# Patient Record
Sex: MALE | Race: OTHER | NOT HISPANIC OR LATINO | URBAN - METROPOLITAN AREA
[De-identification: names, ages, dates, MRNs, and addresses within clinical notes are randomized per-mention and may not be internally consistent; named-entity substitution may affect disease eponyms.]

---

## 2018-07-30 ENCOUNTER — INPATIENT (INPATIENT)
Facility: HOSPITAL | Age: 68
LOS: 9 days | Discharge: HOME HEALTH | End: 2018-08-09
Attending: NEUROLOGICAL SURGERY | Admitting: NEUROLOGICAL SURGERY

## 2018-07-30 VITALS
RESPIRATION RATE: 18 BRPM | DIASTOLIC BLOOD PRESSURE: 77 MMHG | TEMPERATURE: 97 F | SYSTOLIC BLOOD PRESSURE: 176 MMHG | HEART RATE: 69 BPM | OXYGEN SATURATION: 100 %

## 2018-07-30 DIAGNOSIS — Z95.1 PRESENCE OF AORTOCORONARY BYPASS GRAFT: Chronic | ICD-10-CM

## 2018-07-30 LAB
ANION GAP SERPL CALC-SCNC: 15 MMOL/L — HIGH (ref 7–14)
APTT BLD: 32.1 SEC — SIGNIFICANT CHANGE UP (ref 27–39.2)
BASOPHILS # BLD AUTO: 0.02 K/UL — SIGNIFICANT CHANGE UP (ref 0–0.2)
BASOPHILS NFR BLD AUTO: 0.2 % — SIGNIFICANT CHANGE UP (ref 0–1)
BUN SERPL-MCNC: 11 MG/DL — SIGNIFICANT CHANGE UP (ref 10–20)
CALCIUM SERPL-MCNC: 9.7 MG/DL — SIGNIFICANT CHANGE UP (ref 8.5–10.1)
CHLORIDE SERPL-SCNC: 94 MMOL/L — LOW (ref 98–110)
CK MB CFR SERPL CALC: 1.7 NG/ML — SIGNIFICANT CHANGE UP (ref 0.6–6.3)
CK SERPL-CCNC: 53 U/L — SIGNIFICANT CHANGE UP (ref 0–225)
CO2 SERPL-SCNC: 31 MMOL/L — SIGNIFICANT CHANGE UP (ref 17–32)
CREAT SERPL-MCNC: 0.8 MG/DL — SIGNIFICANT CHANGE UP (ref 0.7–1.5)
EOSINOPHIL # BLD AUTO: 0.07 K/UL — SIGNIFICANT CHANGE UP (ref 0–0.7)
EOSINOPHIL NFR BLD AUTO: 0.7 % — SIGNIFICANT CHANGE UP (ref 0–8)
ERYTHROCYTE [SEDIMENTATION RATE] IN BLOOD: 16 MM/HR — HIGH (ref 0–10)
GLUCOSE SERPL-MCNC: 208 MG/DL — HIGH (ref 70–99)
HCT VFR BLD CALC: 42 % — SIGNIFICANT CHANGE UP (ref 42–52)
HGB BLD-MCNC: 15.1 G/DL — SIGNIFICANT CHANGE UP (ref 14–18)
IMM GRANULOCYTES NFR BLD AUTO: 0.3 % — SIGNIFICANT CHANGE UP (ref 0.1–0.3)
INR BLD: 1.18 RATIO — SIGNIFICANT CHANGE UP (ref 0.65–1.3)
LYMPHOCYTES # BLD AUTO: 1.56 K/UL — SIGNIFICANT CHANGE UP (ref 1.2–3.4)
LYMPHOCYTES # BLD AUTO: 16.1 % — LOW (ref 20.5–51.1)
MCHC RBC-ENTMCNC: 33.9 PG — HIGH (ref 27–31)
MCHC RBC-ENTMCNC: 36 G/DL — SIGNIFICANT CHANGE UP (ref 32–37)
MCV RBC AUTO: 94.2 FL — HIGH (ref 80–94)
MONOCYTES # BLD AUTO: 0.73 K/UL — HIGH (ref 0.1–0.6)
MONOCYTES NFR BLD AUTO: 7.6 % — SIGNIFICANT CHANGE UP (ref 1.7–9.3)
NEUTROPHILS # BLD AUTO: 7.25 K/UL — HIGH (ref 1.4–6.5)
NEUTROPHILS NFR BLD AUTO: 75.1 % — SIGNIFICANT CHANGE UP (ref 42.2–75.2)
NRBC # BLD: 0 /100 WBCS — SIGNIFICANT CHANGE UP (ref 0–0)
PLATELET # BLD AUTO: 96 K/UL — LOW (ref 130–400)
POTASSIUM SERPL-MCNC: 4.1 MMOL/L — SIGNIFICANT CHANGE UP (ref 3.5–5)
POTASSIUM SERPL-SCNC: 4.1 MMOL/L — SIGNIFICANT CHANGE UP (ref 3.5–5)
PROTHROM AB SERPL-ACNC: 12.7 SEC — SIGNIFICANT CHANGE UP (ref 9.95–12.87)
RBC # BLD: 4.46 M/UL — LOW (ref 4.7–6.1)
RBC # FLD: 12.2 % — SIGNIFICANT CHANGE UP (ref 11.5–14.5)
SODIUM SERPL-SCNC: 140 MMOL/L — SIGNIFICANT CHANGE UP (ref 135–146)
TROPONIN T SERPL-MCNC: <0.01 NG/ML — SIGNIFICANT CHANGE UP
WBC # BLD: 9.66 K/UL — SIGNIFICANT CHANGE UP (ref 4.8–10.8)
WBC # FLD AUTO: 9.66 K/UL — SIGNIFICANT CHANGE UP (ref 4.8–10.8)

## 2018-07-30 RX ORDER — ENOXAPARIN SODIUM 100 MG/ML
40 INJECTION SUBCUTANEOUS DAILY
Qty: 0 | Refills: 0 | Status: DISCONTINUED | OUTPATIENT
Start: 2018-07-30 | End: 2018-08-05

## 2018-07-30 RX ORDER — MORPHINE SULFATE 50 MG/1
4 CAPSULE, EXTENDED RELEASE ORAL ONCE
Qty: 0 | Refills: 0 | Status: DISCONTINUED | OUTPATIENT
Start: 2018-07-30 | End: 2018-07-30

## 2018-07-30 RX ORDER — OXYCODONE HYDROCHLORIDE 5 MG/1
10 TABLET ORAL
Qty: 0 | Refills: 0 | Status: DISCONTINUED | OUTPATIENT
Start: 2018-07-30 | End: 2018-08-06

## 2018-07-30 RX ORDER — LOSARTAN POTASSIUM 100 MG/1
50 TABLET, FILM COATED ORAL DAILY
Qty: 0 | Refills: 0 | Status: DISCONTINUED | OUTPATIENT
Start: 2018-07-30 | End: 2018-08-06

## 2018-07-30 RX ADMIN — OXYCODONE HYDROCHLORIDE 10 MILLIGRAM(S): 5 TABLET ORAL at 17:51

## 2018-07-30 RX ADMIN — OXYCODONE HYDROCHLORIDE 10 MILLIGRAM(S): 5 TABLET ORAL at 18:14

## 2018-07-30 RX ADMIN — MORPHINE SULFATE 4 MILLIGRAM(S): 50 CAPSULE, EXTENDED RELEASE ORAL at 09:37

## 2018-07-30 RX ADMIN — ENOXAPARIN SODIUM 40 MILLIGRAM(S): 100 INJECTION SUBCUTANEOUS at 17:51

## 2018-07-30 NOTE — ED PROVIDER NOTE - ATTENDING CONTRIBUTION TO CARE
67M DM not on meds, HTN, CAD CABG, smoker, sent by Dr. Sunita Diez for admission. Found to have C5-7  osteomyelitis/discitis on MRI. pt states he has had months of neck pain. no trauma. no fever, chills. no nausea, vomiting. no numbness, weakness, tingling. no ha. no cp, sob, abd pain, nvdc, dysuria, freq, hematuria, cough. on exam, AFVSS, well zhane nad, ncat, eomi, perrla, mmm, lctab, rrr nl s1s2 no mrg, abd soft ntnd, aaox3, cn 2-12 intact, neck supple no stiffness, no midline c/t/l spine ttp or step off, no rash, no erythema/warmth, nontender neck/back, normal gait, 5/5 motor x 4 ext, silt x 4 ext, no le edema or calf ttp, a/p; Admit for osteomyelitis/discitis, labs, ns c/s, ID c/s

## 2018-07-30 NOTE — H&P ADULT - NSHPLABSRESULTS_GEN_ALL_CORE
Vital Signs Last 24 Hrs  T(C): 36.4 (30 Jul 2018 12:20), Max: 36.4 (30 Jul 2018 12:20)  T(F): 97.5 (30 Jul 2018 12:20), Max: 97.5 (30 Jul 2018 12:20)  HR: 63 (30 Jul 2018 12:20) (63 - 69)  BP: 175/82 (30 Jul 2018 12:20) (132/71 - 176/77)  BP(mean): --  RR: 18 (30 Jul 2018 12:20) (18 - 18)  SpO2: 98% (30 Jul 2018 12:20) (98% - 100%)                          15.1   9.66  )-----------( 96       ( 30 Jul 2018 09:22 )             42.0       07-30    140  |  94<L>  |  11  ----------------------------<  208<H>  4.1   |  31  |  0.8    Ca    9.7      30 Jul 2018 09:22

## 2018-07-30 NOTE — ED PROVIDER NOTE - PHYSICAL EXAMINATION
Gen: Alert, NAD, well appearing  Head: NC, AT, PERRL, EOMI  ENT: normal hearing, patent oropharynx without erythema/exudate, uvula midline  Neck: +supple, generalized tenderness to cervical spine  Pulm: Bilateral BS, normal resp effort  CV: RRR  Abd: soft, NT/ND  Mskel: no edema  Skin: no rash  Neuro: AAOx3

## 2018-07-30 NOTE — H&P ADULT - NSHPPHYSICALEXAM_GEN_ALL_CORE
PHYSICAL EXAM:      Constitutional: Alert, awake, in mild distress due to pain.    Eyes: EOMI, PERRLA, visual fields full to confrontation, no eye pain/discharge    ENMT: NCAT, no ear pain/discharge    Neck: supple. Pain on neck flexion and extension+; ROM not decreased    Respiratory: clear to auscultation bilaterally    Cardiovascular: S1 S2 normal. no murmur, rub or gallop    Gastrointestinal: soft, nontender, nondistended. bowel sounds+    Neurological: AAOx3, V1-V3 intact. Tongue midline. Muscle strength 5/5 on shoulder shrug b/l, both UE and LE. Sensation to light touch intact. Gait normal.    Musculoskeletal: range of motion normal on arm abduction, adduction, flexion and extension bilaterally. Pain+ on abduction of arms bilaterally.

## 2018-07-30 NOTE — H&P ADULT - NSHPOUTPATIENTPROVIDERS_GEN_ALL_CORE
Dr. Marium Gurrola (PCP)  Dr. Diez (Neurosurgery)  Dr. Aurelio Krishnamurthy  (Pain management specialist) Dr. Marium Gurrola (PCP)  Dr. Sunita Diez (Neurosurgery)  Dr. Aurelio Krishnamurthy  (Pain management specialist)

## 2018-07-30 NOTE — ED PROVIDER NOTE - NS ED ROS FT
Review of Systems  Constitutional: (-) fever  Eyes/ENT: (-) blurry vision  Cardiovascular: (-) chest pain  Respiratory: (-) cough, (-) shortness of breath  Gastrointestinal: (-) vomiting, (-) diarrhea  Musculoskeletal: (+) neck pain  Integumentary: (-) rash  Neurological: (-) headache

## 2018-07-30 NOTE — H&P ADULT - PSH
Problem: Patient Care Overview  Goal: Plan of Care Review  Outcome: Ongoing (interventions implemented as appropriate)  Patient's mental status waxing and waning throughout day. Patient has been more alert this afternoon however - able to eat dinner without difficulty. Meds crushed and given with applesauce. UA done. 1L NS bolus given x1, MIVF now infusing at 100cc/hr. Patient had 1 incontinent episode of urine this afternoon following bladder scan. No BM since this am - awaiting sample to send for occult blood. GIOVANNI with bubble study ordered. Patient currently off unit for CT of chest/abdomen/pelvis.        History of coronary artery bypass surgery

## 2018-07-30 NOTE — H&P ADULT - PMH
Coronary artery disease  s/p CABG  Diabetes mellitus  Diet-controlled; has taken metformin in the past as per patient  HTN (hypertension)  On atenolol-chlorthalidone but doesnt take it

## 2018-07-30 NOTE — H&P ADULT - ATTENDING COMMENTS
Patient is seen and examined independently at bedside. I agree with the resident's note, history as above.     ROS:  CONSTITUTIONAL: No fever, chills, generalized weakness or fatigue.  EYES: No eye pain, visual disturbances, or discharge.  ENMT:  No difficulty hearing, tinnitus, vertigo; No sinus or throat pain.  RESPIRATORY: No cough, wheezing, chills or hemoptysis; No shortness of breath.  CARDIOVASCULAR: No chest pain, palpitations, dizziness.  GASTROINTESTINAL: No abdominal or epigastric pain. No nausea, vomiting, or hematemesis; No diarrhea or constipation.   GENITOURINARY: No dysuria, frequency, hematuria, or incontinence.  NEUROLOGICAL: No headaches, memory loss, loss of strength, numbness, or tremors.  ENDOCRINE: No heat or cold intolerance; No hair loss.  MUSCULOSKELETAL: Neck and upper back pain as per HPI. No joint pain or swelling;   HEME/LYMPH: No easy bruising, or bleeding gums.  SKIN: No rash or itchiness.    PE:  GENERAL: NAD, well-groomed, well-developed.  HEAD:  Atraumatic, Normocephalic.  EYES: EOMI, PERRLA, conjunctiva and sclera clear.  ENMT: Moist mucous membranes, No lesions.  NERVOUS SYSTEM:  Alert & Oriented X3, Good concentration; No limb weakness or numbness noted over bilateral upper and lower extremities.  RESPIRATORY: Clear to percussion bilaterally; No rales, rhonchi, wheezing, or rubs.  CARDIOVASCULAR: Regular rate and rhythm; No murmurs, rubs, or gallops.  GASTROINTESTINAL: Soft, Nontender, Nondistended; Bowel sounds present.  MUSCULOSKELETAL: Move all extremities.   EXTREMITIES: No clubbing, cyanosis, or edema.  LYMPH: No lymphadenopathy noted.  SKIN: No rashes or lesions.    # Possible Cervical osteomyelitis  - obtain blood culture  - as per ID, check ESR, CRP, HIV, TB  - holding antibiotics for now  - follow up neurosurgery regarding need for biopsy vs. further imaging  - CXR reviewed, negative for opacities or lesions, follow up official results  - pain control    # Diabetes mellitus   - monitor fingerstick glucose, check HbA1c  - start insulin regimen if fingerstick > 180    # HTN  - on ARB, holding home meds    # DVT Prophylaxis   - on lovenox subcut    All labs, radiologic studies, vitals, orders and medications list reviewed. Patient is seen and examined at bedside. Case discussed with medical team. Patient is seen and examined independently at bedside. I agree with the resident's note, history as above.     ROS:  CONSTITUTIONAL: No fever, chills, generalized weakness or fatigue.  EYES: No eye pain, visual disturbances, or discharge.  ENMT:  No difficulty hearing, tinnitus, vertigo; No sinus or throat pain.  RESPIRATORY: No cough, wheezing, chills or hemoptysis; No shortness of breath.  CARDIOVASCULAR: No chest pain, palpitations, dizziness.  GASTROINTESTINAL: No abdominal or epigastric pain. No nausea, vomiting, or hematemesis; No diarrhea or constipation.   GENITOURINARY: No dysuria, frequency, hematuria, or incontinence.  NEUROLOGICAL: No headaches, memory loss, loss of strength, numbness, or tremors.  ENDOCRINE: No heat or cold intolerance; No hair loss.  MUSCULOSKELETAL: Neck and upper back pain as per HPI. No joint pain or swelling;   HEME/LYMPH: No easy bruising, or bleeding gums.  SKIN: No rash or itchiness.    PE:  GENERAL: NAD, well-groomed, well-developed.  HEAD:  Atraumatic, Normocephalic.  EYES: EOMI, PERRLA, conjunctiva and sclera clear.  ENMT: Moist mucous membranes, No lesions.  NERVOUS SYSTEM:  Alert & Oriented X3, Good concentration; No limb weakness or numbness noted over bilateral upper and lower extremities.  RESPIRATORY: Clear to percussion bilaterally; No rales, rhonchi, wheezing, or rubs.  CARDIOVASCULAR: Regular rate and rhythm; No murmurs, rubs, or gallops.  GASTROINTESTINAL: Soft, Nontender, Nondistended; Bowel sounds present.  MUSCULOSKELETAL: Move all extremities.   EXTREMITIES: No clubbing, cyanosis, or edema.  LYMPH: No lymphadenopathy noted.  SKIN: No rashes or lesions.    # Possible Cervical Osteomyelitis/Discitis   - obtain blood culture  - as per ID, check ESR, CRP, HIV, Tuberculosis testing  - holding antibiotics for now  - follow up neurosurgery regarding need for culture/biopsy vs. further imaging  - CXR reviewed, negative for opacities or lesions, follow up official results  - pain control    # Diabetes mellitus   - monitor fingerstick glucose, check HbA1c  - start insulin regimen if fingerstick > 180    # HTN  - on ARB, holding home meds    # DVT Prophylaxis   - on lovenox subcut    All labs, radiologic studies, vitals, orders and medications list reviewed. Patient is seen and examined at bedside. Case discussed with medical team.

## 2018-07-30 NOTE — ED PROVIDER NOTE - OBJECTIVE STATEMENT
Patient is a 67 years old M pmhx DM (diet controlled), CABG on oxycodone for neck pain presents to the ED after evaluation as an outpatient by neurosurgery for admission possible osteo vs/discitis. No fever no chills. Was sent on 7/25 but notes pain increased since yesterday.

## 2018-07-30 NOTE — H&P ADULT - HISTORY OF PRESENT ILLNESS
66 yo male with PMHx of DM (diet controlled), HTN, CAD s/p CABG, presents to the ED with worsening pain in the neck and upper back. Pain is present since the past 6 weeks, in the neck and upper back and the shoulders b/l, more so in the left shoulder since the last 5-6 days. He describes the pain as steady and dull usually but becomes sharp during movement, rates 10/10 in intensity, aggravated by the movement, partially relieved for about 5-6 hours with Oxycodone (10mg 2 tabs twice a day). No visible skin changes in the back, no h/o trauma, no stiffness. No previous episodes of similar pain. Patient denies any history of fever, chills, rigor, dizziness or LOC. No history of loss of appetite, fatigue. Patient reports history of night sweats but did not see that as anything abnormal (before the pain started). Patient reports tingling sensation in right hand fingers at times but no weakness or numbness anywhere. No vision and hearing changes. Patient has history of travel last february to [facility], [facility] and [facility]. No history of any sick contacts. Patient has had 3 MRIs of the cervical spine (last 07/24/2018) and was referred by Dr. Diez for inpatient treatment. 68 yo male with PMHx of DM (diet controlled), HTN, CAD s/p CABG, presents to the ED with worsening pain in the neck and upper back. Pain is present since the past 6 weeks, in the neck and upper back and the shoulders b/l, now radiating to the left shoulder since the last 5-6 days. He describes the pain as steady and dull usually but becomes sharp during movement, rates '20/10' in intensity, aggravated by the movement, partially relieved for about 5-6 hours with Oxycodone (10mg 2 tabs twice a day). No visible skin changes in the back, no history of trauma, no stiffness. No previous episodes of similar pain. Patient denies any history of fever, chills, rigor, dizziness or LOC. No history of loss of appetite, fatigue. Patient reports history of night sweats but did not see that as anything abnormal (before the pain started). Patient reports tingling sensation in right hand fingers at times but no weakness or numbness anywhere. No vision and hearing changes. Patient has history of travel last february to [facility], [facility] and [facility]. No history of any sick contacts. Patient has had 3 MRIs of the cervical spine (last 07/24/2018) and was referred by Dr. Diez for inpatient treatment.   Patient has a CD disc with his MRI results (put in his ED chart) 68 yo male with PMHx of DM (diet controlled), HTN, CAD s/p CABG, presents to the ED with worsening pain in the neck and upper back. Pain is present since the past 6 weeks, in the neck and upper back and the shoulders b/l, now radiating to the left shoulder since the last 5-6 days. He describes the pain as steady and dull usually but becomes sharp during movement, rates '20/10' in intensity, aggravated by the movement, partially relieved for about 5-6 hours with Oxycodone (10mg 2 tabs twice a day). No visible skin changes in the back, no history of trauma, no stiffness. No previous episodes of similar pain. Patient denies any history of fever, chills, rigor, dizziness or LOC. No history of loss of appetite, fatigue. Patient reports history of night sweats but did not see that as anything abnormal (before the pain started). Patient reports tingling sensation in right hand fingers at times but no weakness or numbness anywhere. No vision and hearing changes. Patient has history of travel last february to [facility], [facility] [place] and [facility]. No history of any sick contacts. Patient has had 3 MRIs of the cervical spine (last 07/24/2018) because of chronic neck pain and was referred by Dr. Sunita Diez (neurosurgery) for inpatient treatment to rule out osteomyelitis based on MRI findings suspicious for osteomyelitis.  Patient has a CD disc with his MRI results (put in his ED chart)

## 2018-07-30 NOTE — H&P ADULT - ASSESSMENT
66 yo male with PMHx of DM (diet controlled), HTN, CAD s/p CABG, presents to the ED with worsening pain in the neck and upper back, likely to due cervical osteomyelitis vs. discitis.     #Cervical osteomyelitis  -Daily cbc  -Check CRP, ESR  -Follow up with ID reccomendations (consult placed)  -Consult neurosurgery regarding previous MRIs and if a new imaging study is needed (Dr. Diez)  -No antibiotics indicated at the time as the patient is not septic  -Check Quantiferon test for possible TB exposure  -Get Chest Xray to find any pulmonary TB lesion  -Continue oxycodone 10mg bid prn for pain control    #Diabetes mellitus   -Check fingerstick glucose AC/HS  -Check HbA1c  -Carb consistent diet  -Order insulin if glucose levels are consistently high     #HTN    -Start Losartan 50mg once a day   -Hold Atenolol-chlorthalidone 50/25mg   -DASH diet    #Miscellaneous  -DVT ppx (not indicated as the patient is ambulating well)  -GI ppx (Protonix)  -Diet: Carb consistent DASH diet  -Activity: Ambulate as tolerated  -Full code  -Dispo home    # 66 yo male with PMHx of DM (diet controlled), HTN, CAD s/p CABG, presents to the ED with worsening pain in the neck and upper back, likely to due cervical osteomyelitis vs. discitis.     #Cervical osteomyelitis  -Daily cbc  -Check CRP, ESR  -Follow up with ID reccomendations (consult placed)  -Consult neurosurgery regarding previous MRIs and if a new imaging study is needed (Dr. Diez)  -No antibiotics indicated at the time as the patient is not septic  -Check Quantiferon test for possible TB exposure  -Get Chest Xray to find any pulmonary TB lesion  -Continue oxycodone 10mg bid prn for pain control    #Diabetes mellitus   -Check fingerstick glucose AC/HS  -Check HbA1c  -Carb consistent diet  -Order insulin if glucose levels are consistently high     #HTN    -Start Losartan 50mg once a day   -Hold Atenolol-chlorthalidone 50/25mg   -DASH diet  -12 lead EKG    #Miscellaneous  -DVT ppx (not indicated as the patient is ambulating well)  -GI ppx (Protonix)  -Diet: Carb consistent DASH diet  -Activity: Ambulate as tolerated  -Full code  -Dispo home 66 yo male with PMHx of DM (diet controlled), HTN, CAD s/p CABG, was sent to the ED by neurosurgery (Dr. Diez) due to complaints of chronic neck pain (6 weeks) and MRI findings suspicious for cervical osteomyelitis.     #Possible Cervical osteomyelitis  -Daily cbc  -Check CRP, ESR  -Follow up with ID recommendations (consult placed)  -Consult neurosurgery regarding further recommendations  -No antibiotics indicated at the time as the patient is not septic  -Check Quantiferon test for possible TB exposure  -Get Chest X-ray to find any pulmonary TB lesion  -Airborne precautions to be in place pending Quantiferon results and chest xray.  -Continue oxycodone 10mg 2 tabs bid for pain control    #Diabetes mellitus   -Check fingerstick glucose AC/HS  -Check HbA1c  -Carb consistent diet  -Order insulin if glucose levels are consistently high ( >180mg/dl)    #HTN    -Start Losartan 50mg once a day as the patient is diabetic.  -Hold Atenolol-chlorthalidone 50/25mg as the patient was not taking it at home.  -DASH diet  -12 lead EKG    #Miscellaneous  -DVT ppx (Lovenox 40mg sq)  -GI ppx (not indicated)  -Diet: Carb consistent DASH diet  -Activity: Ambulate as tolerated  -Full code  -Dispo home 66 yo male with PMHx of DM (diet controlled), HTN, CAD s/p CABG, was sent to the ED by neurosurgery (Dr. Diez) due to complaints of chronic neck pain (6 weeks) and MRI findings suspicious for cervical osteomyelitis.     #Possible Cervical osteomyelitis  -Daily cbc  -Check CRP, ESR  -Follow up with ID recommendations (consult placed)  -Consult neurosurgery regarding further recommendations  -No antibiotics indicated at the time as the patient is not septic  -Check Quantiferon test for possible TB exposure  -Get Chest X-ray to find any pulmonary TB lesion  -Airborne precautions to be in place pending Quantiferon results and chest xray.  -Continue oxycodone 10mg 2 tabs bid for pain control    #Diabetes mellitus   -Check fingerstick glucose AC/HS  -Check HbA1c  -Carb consistent diet  -Order insulin if glucose levels are consistently high ( >180mg/dl)    #HTN    -Start Losartan 50mg once a day as the patient is diabetic.  -Hold Atenolol-chlorthalidone 50/25mg as the patient was not taking it at home.  -DASH diet  -12 lead EKG    #Miscellaneous  -DVT ppx (Lovenox 40mg sq once daily)  -GI ppx (not indicated)  -Diet: Carb consistent DASH diet  -Activity: Ambulate as tolerated  -Full code  -Dispo home

## 2018-07-30 NOTE — CONSULT NOTE ADULT - SUBJECTIVE AND OBJECTIVE BOX
HPI:   67 year old male with PMHx of DM (diet controlled), HTN, CAD s/p CABG, presents with worsening pain in the neck and upper back, which started about 6 weeks ago. He denies any fevers but reports night sweats. He did not have any surgical procedures in last 6 months, including dental procedures. No h/o IVDU. He reports tingling sensation in right hand. Outpt. MRI showed possible osteomyelitis.     Allergies: No Known Allergies:      Past Medical History:  Coronary artery disease  s/p CABG  Diabetes mellitus  Diet-controlled; has taken metformin in the past as per patient  HTN (hypertension)  On atenolol-chlorthalidone.     Past Surgical History:  History of coronary artery bypass surgery.    Meds:   enoxaparin Injectable 40 milliGRAM(s) SubCutaneous daily  losartan 50 milliGRAM(s) Oral daily  oxyCODONE    IR 10 milliGRAM(s) Oral two times a day    Awake, alert, oriented, on room air, no wheezing, neck movement without restriction, motor strength in all extremities 5/5,  Abdomen- soft, nontender, no edema, no rash.     T(C): 36.4 (07-30-18 @ 12:20), Max: 36.4 (07-30-18 @ 12:20)  HR: 63 (07-30-18 @ 12:20) (63 - 69)  BP: 175/82 (07-30-18 @ 12:20) (132/71 - 176/77)  RR: 18 (07-30-18 @ 12:20) (18 - 18)  SpO2: 98% (07-30-18 @ 12:20) (98% - 100%)                          15.1   9.66  )-----------( 96       ( 30 Jul 2018 09:22 )             42.0       07-30    140  |  94<L>  |  11  ----------------------------<  208<H>  4.1   |  31  |  0.8    Ca    9.7      30 Jul 2018 09:22    Outpt MRI- Images not seen. Report noted.

## 2018-07-30 NOTE — H&P ADULT - FAMILY HISTORY
No pertinent family history in first degree relatives Mother  Still living? Unknown  Family history of heart disease, Age at diagnosis: Age Unknown

## 2018-07-30 NOTE — ED PROVIDER NOTE - PROGRESS NOTE DETAILS
patient admitted to medicine service under Dr Villalobos who accepted patient , MAR aware, Dr Bossman MARTINEZ will consult on patient for abx choice, neurosurgery DISHA Ohara aware of patient

## 2018-07-30 NOTE — CONSULT NOTE ADULT - ASSESSMENT
68 yo male with PMHx of DM (diet controlled), HTN, CAD s/p CABG, was sent to the ED by Dr. Diez for evaluation of upper back pain.     # Upper back pain due to cervical osteomyelitis vs discitis:   - No risk factor identified during history.  - Blood culture and urine culture.  - Check CRP, ESR, TB Gold,   - Hold antibiotics for now.   - CXR and MRI pending    #Diabetes mellitus, HTN,     # Full code 66 yo male with PMHx of DM (diet controlled), HTN, CAD s/p CABG, was sent to the ED by Dr. Diez for evaluation of upper back pain.     # Upper back pain due to cervical osteomyelitis vs discitis:   - No risk factor identified during history.  - Blood culture and urine culture.  - Check CRP, ESR, TB Gold, HIV, 2D Echo.   - Hold antibiotics for now.   - CXR and MRI pending    #Diabetes mellitus, HTN,     # Full code

## 2018-07-31 LAB
ANION GAP SERPL CALC-SCNC: 11 MMOL/L — SIGNIFICANT CHANGE UP (ref 7–14)
BUN SERPL-MCNC: 13 MG/DL — SIGNIFICANT CHANGE UP (ref 10–20)
CALCIUM SERPL-MCNC: 9.7 MG/DL — SIGNIFICANT CHANGE UP (ref 8.5–10.1)
CHLORIDE SERPL-SCNC: 97 MMOL/L — LOW (ref 98–110)
CK MB CFR SERPL CALC: 1.9 NG/ML — SIGNIFICANT CHANGE UP (ref 0.6–6.3)
CK SERPL-CCNC: 53 U/L — SIGNIFICANT CHANGE UP (ref 0–225)
CO2 SERPL-SCNC: 33 MMOL/L — HIGH (ref 17–32)
CREAT SERPL-MCNC: 0.9 MG/DL — SIGNIFICANT CHANGE UP (ref 0.7–1.5)
CRP SERPL-MCNC: 0.25 MG/DL — SIGNIFICANT CHANGE UP (ref 0–0.4)
ERYTHROCYTE [SEDIMENTATION RATE] IN BLOOD: 29 MM/HR — HIGH (ref 0–10)
ESTIMATED AVERAGE GLUCOSE: 197 MG/DL — HIGH (ref 68–114)
GLUCOSE SERPL-MCNC: 175 MG/DL — HIGH (ref 70–99)
HBA1C BLD-MCNC: 8.5 % — HIGH (ref 4–5.6)
HCT VFR BLD CALC: 42.4 % — SIGNIFICANT CHANGE UP (ref 42–52)
HGB BLD-MCNC: 15.1 G/DL — SIGNIFICANT CHANGE UP (ref 14–18)
MCHC RBC-ENTMCNC: 33.8 PG — HIGH (ref 27–31)
MCHC RBC-ENTMCNC: 35.6 G/DL — SIGNIFICANT CHANGE UP (ref 32–37)
MCV RBC AUTO: 94.9 FL — HIGH (ref 80–94)
NRBC # BLD: 0 /100 WBCS — SIGNIFICANT CHANGE UP (ref 0–0)
PLATELET # BLD AUTO: 86 K/UL — LOW (ref 130–400)
POTASSIUM SERPL-MCNC: 4.5 MMOL/L — SIGNIFICANT CHANGE UP (ref 3.5–5)
POTASSIUM SERPL-SCNC: 4.5 MMOL/L — SIGNIFICANT CHANGE UP (ref 3.5–5)
RBC # BLD: 4.47 M/UL — LOW (ref 4.7–6.1)
RBC # FLD: 12.3 % — SIGNIFICANT CHANGE UP (ref 11.5–14.5)
SODIUM SERPL-SCNC: 141 MMOL/L — SIGNIFICANT CHANGE UP (ref 135–146)
TROPONIN T SERPL-MCNC: <0.01 NG/ML — SIGNIFICANT CHANGE UP
WBC # BLD: 5.71 K/UL — SIGNIFICANT CHANGE UP (ref 4.8–10.8)
WBC # FLD AUTO: 5.71 K/UL — SIGNIFICANT CHANGE UP (ref 4.8–10.8)

## 2018-07-31 RX ORDER — ASPIRIN/CALCIUM CARB/MAGNESIUM 324 MG
81 TABLET ORAL DAILY
Qty: 0 | Refills: 0 | Status: DISCONTINUED | OUTPATIENT
Start: 2018-07-31 | End: 2018-08-04

## 2018-07-31 RX ADMIN — ENOXAPARIN SODIUM 40 MILLIGRAM(S): 100 INJECTION SUBCUTANEOUS at 12:12

## 2018-07-31 RX ADMIN — OXYCODONE HYDROCHLORIDE 10 MILLIGRAM(S): 5 TABLET ORAL at 18:15

## 2018-07-31 RX ADMIN — OXYCODONE HYDROCHLORIDE 10 MILLIGRAM(S): 5 TABLET ORAL at 08:05

## 2018-07-31 RX ADMIN — OXYCODONE HYDROCHLORIDE 10 MILLIGRAM(S): 5 TABLET ORAL at 19:13

## 2018-07-31 RX ADMIN — Medication 81 MILLIGRAM(S): at 12:12

## 2018-07-31 RX ADMIN — LOSARTAN POTASSIUM 50 MILLIGRAM(S): 100 TABLET, FILM COATED ORAL at 07:05

## 2018-07-31 RX ADMIN — OXYCODONE HYDROCHLORIDE 10 MILLIGRAM(S): 5 TABLET ORAL at 07:06

## 2018-07-31 NOTE — PROGRESS NOTE ADULT - ASSESSMENT
68 yo male with PMHx of DM (diet controlled), HTN, CAD s/p CABG, was sent to the ED by Dr. Diez for evaluation of upper back pain.     # Upper back pain due to cervical osteomyelitis vs discitis:   - No risk factor identified during history except international travel.   - TB Gold, HIV , Blood culture and urine culture pending  - Hold antibiotics for now.   - OR for biopsy by neurosurg. later.   - Echo shows a possible vegetation on PMVL.   - Cardiology consult for possible JUANA.   - CXR did not show active TB.    #Diabetes mellitus, HTN.    # Full code

## 2018-07-31 NOTE — PROGRESS NOTE ADULT - SUBJECTIVE AND OBJECTIVE BOX
INTERVAL HPI/OVERNIGHT EVENTS:  Stable  No Known Allergies    aspirin  chewable 81 milliGRAM(s) Oral daily  enoxaparin Injectable 40 milliGRAM(s) SubCutaneous daily  losartan 50 milliGRAM(s) Oral daily  oxyCODONE    IR 10 milliGRAM(s) Oral two times a day      Vital Signs Last 24 Hrs  T(C): 36.4 (31 Jul 2018 13:09), Max: 36.4 (31 Jul 2018 05:16)  T(F): 97.6 (31 Jul 2018 13:09), Max: 97.6 (31 Jul 2018 13:09)  HR: 75 (31 Jul 2018 13:09) (64 - 80)  BP: 135/59 (31 Jul 2018 13:09) (135/59 - 149/85)  BP(mean): --  RR: 18 (31 Jul 2018 13:09) (18 - 18)  SpO2: 97% (31 Jul 2018 07:30) (97% - 98%)    LABS:                        15.1   5.71  )-----------( 86       ( 31 Jul 2018 07:59 )             42.4     07-31    141  |  97<L>  |  13  ----------------------------<  175<H>  4.5   |  33<H>  |  0.9    Ca    9.7      31 Jul 2018 07:59    Hemoglobin A1C with Mean Plasma Glucose (07.31.18 @ 07:59)    Hemoglobin A1C, Whole Blood: 8.5 %    Sedimentation Rate, Erythrocyte (07.30.18 @ 16:20)    Sedimentation Rate, Erythrocyte: 16 mm/Hr    XR CHEST PA LAT 2V:  07/30/2018    No radiographic evidence of acute cardiopulmonary disease.     2-D ECHO (TTE) COMPLETE:  07/31/2018     1. Left ventricular ejection fraction, by visual estimation, is 55 to   60%.   2. LV Ejection Fraction by Brown's Method with a biplane EF of 59 %.   3. Spectral Doppler shows impaired relaxation pattern of left   ventricular myocardial filling (Grade I diastolic dysfunction).   4. Mildly enlarged left atrium.   5. Normal right atrial size.   6. Mild mitral annular calcification.   7. Thickening of the anterior and posterior mitral valve leaflets.   8. Cannot exclude a vegetation on atrial aspect of PMVL.   9. Trace tricuspid regurgitation.  10. No evidence of aortic stenosis.    Outpt MRI images (not seen personally):  +  contrast enhancement and disc destruction of cervical spine with epidural tissue, resulting in thecal sac impingement. INTERVAL HPI/OVERNIGHT EVENTS:  Stable  No Known Allergies    aspirin  chewable 81 milliGRAM(s) Oral daily  enoxaparin Injectable 40 milliGRAM(s) SubCutaneous daily  losartan 50 milliGRAM(s) Oral daily  oxyCODONE    IR 10 milliGRAM(s) Oral two times a day    awake, alert, s1s2, no wheezing, no skin changes on neck, abd- soft.    Vital Signs Last 24 Hrs  T(C): 36.4 (31 Jul 2018 13:09), Max: 36.4 (31 Jul 2018 05:16)  T(F): 97.6 (31 Jul 2018 13:09), Max: 97.6 (31 Jul 2018 13:09)  HR: 75 (31 Jul 2018 13:09) (64 - 80)  BP: 135/59 (31 Jul 2018 13:09) (135/59 - 149/85)  BP(mean): --  RR: 18 (31 Jul 2018 13:09) (18 - 18)  SpO2: 97% (31 Jul 2018 07:30) (97% - 98%)    LABS:                        15.1   5.71  )-----------( 86       ( 31 Jul 2018 07:59 )             42.4     07-31    141  |  97<L>  |  13  ----------------------------<  175<H>  4.5   |  33<H>  |  0.9    Ca    9.7      31 Jul 2018 07:59    Hemoglobin A1C with Mean Plasma Glucose (07.31.18 @ 07:59)    Hemoglobin A1C, Whole Blood: 8.5 %    Sedimentation Rate, Erythrocyte (07.30.18 @ 16:20)    Sedimentation Rate, Erythrocyte: 16 mm/Hr    XR CHEST PA LAT 2V:  07/30/2018    No radiographic evidence of acute cardiopulmonary disease.     2-D ECHO (TTE) COMPLETE:  07/31/2018     1. Left ventricular ejection fraction, by visual estimation, is 55 to   60%.   2. LV Ejection Fraction by Brown's Method with a biplane EF of 59 %.   3. Spectral Doppler shows impaired relaxation pattern of left   ventricular myocardial filling (Grade I diastolic dysfunction).   4. Mildly enlarged left atrium.   5. Normal right atrial size.   6. Mild mitral annular calcification.   7. Thickening of the anterior and posterior mitral valve leaflets.   8. Cannot exclude a vegetation on atrial aspect of PMVL.   9. Trace tricuspid regurgitation.  10. No evidence of aortic stenosis.    Outpt MRI images (not seen personally):  +  contrast enhancement and disc destruction of cervical spine with epidural tissue, resulting in thecal sac impingement.

## 2018-07-31 NOTE — CONSULT NOTE ADULT - SUBJECTIVE AND OBJECTIVE BOX
Pt known to me from outpatient. Presented to my office with neck and bilateral shoulder, upper thoracic pain x 4 weeks, with progressive left subtle arm weakness noted on my exam.      Denied fevers, but admits to night sweats.    XR of spine and MRI of cervical spine with and without cst demonstrates lesion within cervical spine highly suspicious for osteomyelitis, ?TB    Pt with (=) hx of travel within last 6 months to [place].     (+) strong smoking hx  (+) strong cardiac hx, s/p CABG, NONCOMPLIANT with cardiac follow up.  (+) diabetic.      On exam, pt noted to have at least ~5 lb weight loss over several weeks.    (+) 4/5 left triceps strength.    No evidence of myelopathy    MRI noted evidence of contrast enhancement and disc destruction with cervical spine with epidural tissue now causing thecal sac impingement.    Given findings, I advised pt to present to ED for full ID workup, with likely procession to OR after ID workup, cardiac, pulmonary, and medical clearance.     RECOMMEND:    CT cervical spine to evaluate bony architecture  ID input appreciated, agree with holding ABX, TB workup.  Cardiac clearance for surgery  Pulmonary clearance for surgery  Medical clearance for surgery    Once these are obtained, will plan on proceeding with 2 level corpectomy with plated fusion.

## 2018-07-31 NOTE — PROGRESS NOTE ADULT - SUBJECTIVE AND OBJECTIVE BOX
24H events:    Patient is a 67y old Male who presents with a chief complaint of neck pain concerning for cervical osteomyelitis. No acute events over night. Off abx per ID recs.      Today is hospital day 1d.     PAST MEDICAL & SURGICAL HISTORY  Coronary artery disease: s/p CABG  HTN (hypertension): On atenolol-chlorthalidone but doesnt take it  Diabetes mellitus: Diet-controlled; has taken metformin in the past as per patient  History of coronary artery bypass surgery    SOCIAL HISTORY:  Negative for smoking/alcohol/drug use.     ALLERGIES:  No Known Allergies    MEDICATIONS:  STANDING MEDICATIONS  aspirin  chewable 81 milliGRAM(s) Oral daily  enoxaparin Injectable 40 milliGRAM(s) SubCutaneous daily  losartan 50 milliGRAM(s) Oral daily  oxyCODONE    IR 10 milliGRAM(s) Oral two times a day    PRN MEDICATIONS    VITALS:   T(F): 97.5  HR: 80  BP: 143/77  RR: 18  SpO2: 97%    LABS:                        15.1   5.71  )-----------( 86       ( 31 Jul 2018 07:59 )             42.4     07-31    141  |  97<L>  |  13  ----------------------------<  175<H>  4.5   |  33<H>  |  0.9    Ca    9.7      31 Jul 2018 07:59      PT/INR - ( 30 Jul 2018 09:22 )   PT: 12.70 sec;   INR: 1.18 ratio         PTT - ( 30 Jul 2018 09:22 )  PTT:32.1 sec      Creatine Kinase, Serum: 53 U/L (07-31-18 @ 07:59)  Troponin T, Serum: <0.01 ng/mL (07-31-18 @ 07:59)  Creatine Kinase, Serum: 53 U/L (07-30-18 @ 21:23)  Troponin T, Serum: <0.01 ng/mL (07-30-18 @ 21:23)  Sedimentation Rate, Erythrocyte: 16 mm/Hr <H> (07-30-18 @ 16:20)      CARDIAC MARKERS ( 31 Jul 2018 07:59 )  x     / <0.01 ng/mL / 53 U/L / x     / 1.9 ng/mL  CARDIAC MARKERS ( 30 Jul 2018 21:23 )  x     / <0.01 ng/mL / 53 U/L / x     / 1.7 ng/mL      RADIOLOGY:    PHYSICAL EXAM:  GEN: No acute distress  LUNGS: Clear to auscultation bilaterally   HEART: S1/S2 present. RRR.   ABD: Soft, non-tender, non-distended. Bowel sounds present  EXT: No edema  NEURO: AAOX3, neck pain with palpation. nonfocal. bilateral shoulder pain with abduction     Assessment and Plan:    Assessment:  · Assessment		  68 yo male with PMHx of DM (diet controlled), HTN, CAD s/p CABG, was sent to the ED by neurosurgery (Dr. Diez) due to complaints of chronic neck pain (6 weeks) and MRI findings suspicious for cervical osteomyelitis.     #Possible Cervical osteomyelitis  -Daily cbc  -Check CRP, ESR  -Id recs appreciated  Blood culture and urine culture.  Check CRP, ESR, TB Gold, HIV, 2D Echo.   Hold antibiotics for now.   CXR and MRI pending    -Consult neurosurgery regarding further recommendations  -No antibiotics indicated at the time as the patient is not septic  -Check Quantiferon test for possible TB exposure  -Get Chest X-ray to find any pulmonary TB lesion  -Airborne precautions to be in place pending Quantiferon results and chest xray.  -Continue oxycodone 10mg 2 tabs bid for pain control    #Diabetes mellitus   -Check fingerstick glucose AC/HS  -Check HbA1c  -Carb consistent diet  -Order insulin if glucose levels are consistently high ( >180mg/dl)    #HTN    -Start Losartan 50mg once a day as the patient is diabetic.  -Hold Atenolol-chlorthalidone 50/25mg as the patient was not taking it at home.  -DASH diet  -12 lead EKG    #Miscellaneous  -DVT ppx (Lovenox 40mg sq once daily)  -GI ppx (not indicated)  -Diet: Carb consistent DASH diet  -Activity: Ambulate as tolerated  -Full code  -Dispo home

## 2018-08-01 LAB
ALBUMIN SERPL ELPH-MCNC: 4.3 G/DL — SIGNIFICANT CHANGE UP (ref 3.5–5.2)
ALP SERPL-CCNC: 89 U/L — SIGNIFICANT CHANGE UP (ref 30–115)
ALT FLD-CCNC: 12 U/L — SIGNIFICANT CHANGE UP (ref 0–41)
ANION GAP SERPL CALC-SCNC: 14 MMOL/L — SIGNIFICANT CHANGE UP (ref 7–14)
AST SERPL-CCNC: 16 U/L — SIGNIFICANT CHANGE UP (ref 0–41)
BILIRUB SERPL-MCNC: 0.8 MG/DL — SIGNIFICANT CHANGE UP (ref 0.2–1.2)
BUN SERPL-MCNC: 14 MG/DL — SIGNIFICANT CHANGE UP (ref 10–20)
CALCIUM SERPL-MCNC: 9.6 MG/DL — SIGNIFICANT CHANGE UP (ref 8.5–10.1)
CHLORIDE SERPL-SCNC: 99 MMOL/L — SIGNIFICANT CHANGE UP (ref 98–110)
CO2 SERPL-SCNC: 30 MMOL/L — SIGNIFICANT CHANGE UP (ref 17–32)
CREAT SERPL-MCNC: 1 MG/DL — SIGNIFICANT CHANGE UP (ref 0.7–1.5)
CRP SERPL-MCNC: 0.29 MG/DL — SIGNIFICANT CHANGE UP (ref 0–0.4)
GLUCOSE SERPL-MCNC: 246 MG/DL — HIGH (ref 70–99)
HCT VFR BLD CALC: 44.5 % — SIGNIFICANT CHANGE UP (ref 42–52)
HGB BLD-MCNC: 15.7 G/DL — SIGNIFICANT CHANGE UP (ref 14–18)
HIV 1+2 AB+HIV1 P24 AG SERPL QL IA: SIGNIFICANT CHANGE UP
M TB TUBERC IFN-G BLD QL: -0.01 IU/ML — SIGNIFICANT CHANGE UP
M TB TUBERC IFN-G BLD QL: 0.06 IU/ML — SIGNIFICANT CHANGE UP
M TB TUBERC IFN-G BLD QL: NEGATIVE — SIGNIFICANT CHANGE UP
MCHC RBC-ENTMCNC: 33.4 PG — HIGH (ref 27–31)
MCHC RBC-ENTMCNC: 35.3 G/DL — SIGNIFICANT CHANGE UP (ref 32–37)
MCV RBC AUTO: 94.7 FL — HIGH (ref 80–94)
MITOGEN IGNF BCKGRD COR BLD-ACNC: >10 IU/ML — SIGNIFICANT CHANGE UP
NRBC # BLD: 0 /100 WBCS — SIGNIFICANT CHANGE UP (ref 0–0)
PLATELET # BLD AUTO: 103 K/UL — LOW (ref 130–400)
POTASSIUM SERPL-MCNC: 3.7 MMOL/L — SIGNIFICANT CHANGE UP (ref 3.5–5)
POTASSIUM SERPL-SCNC: 3.7 MMOL/L — SIGNIFICANT CHANGE UP (ref 3.5–5)
PROT SERPL-MCNC: 6.7 G/DL — SIGNIFICANT CHANGE UP (ref 6–8)
RBC # BLD: 4.7 M/UL — SIGNIFICANT CHANGE UP (ref 4.7–6.1)
RBC # FLD: 12.4 % — SIGNIFICANT CHANGE UP (ref 11.5–14.5)
SODIUM SERPL-SCNC: 143 MMOL/L — SIGNIFICANT CHANGE UP (ref 135–146)
WBC # BLD: 7.11 K/UL — SIGNIFICANT CHANGE UP (ref 4.8–10.8)
WBC # FLD AUTO: 7.11 K/UL — SIGNIFICANT CHANGE UP (ref 4.8–10.8)

## 2018-08-01 RX ORDER — DOCUSATE SODIUM 100 MG
100 CAPSULE ORAL DAILY
Qty: 0 | Refills: 0 | Status: DISCONTINUED | OUTPATIENT
Start: 2018-08-01 | End: 2018-08-06

## 2018-08-01 RX ORDER — SENNA PLUS 8.6 MG/1
2 TABLET ORAL AT BEDTIME
Qty: 0 | Refills: 0 | Status: DISCONTINUED | OUTPATIENT
Start: 2018-08-01 | End: 2018-08-06

## 2018-08-01 RX ORDER — INSULIN LISPRO 100/ML
2 VIAL (ML) SUBCUTANEOUS ONCE
Qty: 0 | Refills: 0 | Status: COMPLETED | OUTPATIENT
Start: 2018-08-01 | End: 2018-08-01

## 2018-08-01 RX ADMIN — OXYCODONE HYDROCHLORIDE 10 MILLIGRAM(S): 5 TABLET ORAL at 17:41

## 2018-08-01 RX ADMIN — OXYCODONE HYDROCHLORIDE 10 MILLIGRAM(S): 5 TABLET ORAL at 05:28

## 2018-08-01 RX ADMIN — SENNA PLUS 2 TABLET(S): 8.6 TABLET ORAL at 21:38

## 2018-08-01 RX ADMIN — Medication 81 MILLIGRAM(S): at 11:44

## 2018-08-01 RX ADMIN — OXYCODONE HYDROCHLORIDE 10 MILLIGRAM(S): 5 TABLET ORAL at 05:58

## 2018-08-01 RX ADMIN — Medication 100 MILLIGRAM(S): at 11:44

## 2018-08-01 RX ADMIN — OXYCODONE HYDROCHLORIDE 10 MILLIGRAM(S): 5 TABLET ORAL at 18:13

## 2018-08-01 RX ADMIN — LOSARTAN POTASSIUM 50 MILLIGRAM(S): 100 TABLET, FILM COATED ORAL at 05:28

## 2018-08-01 RX ADMIN — ENOXAPARIN SODIUM 40 MILLIGRAM(S): 100 INJECTION SUBCUTANEOUS at 11:44

## 2018-08-01 RX ADMIN — Medication 2 UNIT(S): at 03:28

## 2018-08-01 NOTE — PROGRESS NOTE ADULT - SUBJECTIVE AND OBJECTIVE BOX
24H events:    Patient is a 67y old Male with cervical osteomyelitis.   Today is hospital day 2d. No acute events over night. Neurosurgery planing on taking him to the OR early next week. Needs medical, cardiac and pulm clearance.     PAST MEDICAL & SURGICAL HISTORY  Coronary artery disease: s/p CABG  HTN (hypertension): On atenolol-chlorthalidone but doesnt take it  Diabetes mellitus: Diet-controlled; has taken metformin in the past as per patient  History of coronary artery bypass surgery    SOCIAL HISTORY:  Negative for smoking/alcohol/drug use.     ALLERGIES:  No Known Allergies    MEDICATIONS:  STANDING MEDICATIONS  aspirin  chewable 81 milliGRAM(s) Oral daily  docusate sodium 100 milliGRAM(s) Oral daily  enoxaparin Injectable 40 milliGRAM(s) SubCutaneous daily  losartan 50 milliGRAM(s) Oral daily  oxyCODONE    IR 10 milliGRAM(s) Oral two times a day  senna 2 Tablet(s) Oral at bedtime    PRN MEDICATIONS    VITALS:   T(F): 97.5  HR: 72  BP: 140/67  RR: 18  SpO2: 97%    LABS:                        15.1   5.71  )-----------( 86       ( 31 Jul 2018 07:59 )             42.4     07-31    141  |  97<L>  |  13  ----------------------------<  175<H>  4.5   |  33<H>  |  0.9    Ca    9.7      31 Jul 2018 07:59            Sedimentation Rate, Erythrocyte: 29 mm/Hr <H> (07-31-18 @ 17:59)      CARDIAC MARKERS ( 31 Jul 2018 07:59 )  x     / <0.01 ng/mL / 53 U/L / x     / 1.9 ng/mL  CARDIAC MARKERS ( 30 Jul 2018 21:23 )  x     / <0.01 ng/mL / 53 U/L / x     / 1.7 ng/mL      RADIOLOGY:    PHYSICAL EXAM:  GEN: No acute distress  LUNGS: Clear to auscultation bilaterally   HEART: S1/S2 present. RRR.   ABD: Soft, non-tender, non-distended. Bowel sounds present  EXT: No edema  NEURO: AAOX3, neck pain with palpation. nonfocal. bilateral shoulder pain with abduction     Assessment and Plan:    Assessment:  · Assessment		  66 yo male with PMHx of DM (diet controlled), HTN, CAD s/p CABG, was sent to the ED by neurosurgery (Dr. Diez) due to complaints of chronic neck pain (6 weeks) and MRI findings suspicious for cervical osteomyelitis.     #Possible Cervical osteomyelitis  -Daily cbc  -Id recs appreciated  Blood culture and urine culture.  Check CRP, ESR, TB Gold, HIV, 2D Echo.   Hold antibiotics for now.   CXR and MRI pending    -Consult neurosurgery regarding further recommendations  -No antibiotics indicated at the time as the patient is not septic  -Check Quantiferon test for possible TB exposure  -Get Chest X-ray to find any pulmonary TB lesion  -Airborne precautions to be in place pending Quantiferon results and chest xray.  -Continue oxycodone 10mg 2 tabs bid for pain control    #Diabetes mellitus   -Check fingerstick glucose AC/HS  -Check HbA1c  -Carb consistent diet  -Order insulin if glucose levels are consistently high ( >180mg/dl)    #HTN    -Start Losartan 50mg once a day as the patient is diabetic.  -Hold Atenolol-chlorthalidone 50/25mg as the patient was not taking it at home.  -DASH diet  -12 lead EKG    #Miscellaneous  -DVT ppx (Lovenox 40mg sq once daily)  -GI ppx (not indicated)  -Diet: Carb consistent DASH diet  -Activity: Ambulate as tolerated  -Full code  -Dispo home 24H events:    Patient is a 67y old Male with cervical osteomyelitis.   Today is hospital day 2d. No acute events over night. Neurosurgery planing on taking him to the OR early next week. Needs medical, cardiac and pulm clearance.     PAST MEDICAL & SURGICAL HISTORY  Coronary artery disease: s/p CABG  HTN (hypertension): On atenolol-chlorthalidone but doesnt take it  Diabetes mellitus: Diet-controlled; has taken metformin in the past as per patient  History of coronary artery bypass surgery    SOCIAL HISTORY:  Negative for smoking/alcohol/drug use.     ALLERGIES:  No Known Allergies    MEDICATIONS:  STANDING MEDICATIONS  aspirin  chewable 81 milliGRAM(s) Oral daily  docusate sodium 100 milliGRAM(s) Oral daily  enoxaparin Injectable 40 milliGRAM(s) SubCutaneous daily  losartan 50 milliGRAM(s) Oral daily  oxyCODONE    IR 10 milliGRAM(s) Oral two times a day  senna 2 Tablet(s) Oral at bedtime    PRN MEDICATIONS    VITALS:   T(F): 97.5  HR: 72  BP: 140/67  RR: 18  SpO2: 97%    LABS:                        15.1   5.71  )-----------( 86       ( 31 Jul 2018 07:59 )             42.4     07-31    141  |  97<L>  |  13  ----------------------------<  175<H>  4.5   |  33<H>  |  0.9    Ca    9.7      31 Jul 2018 07:59            Sedimentation Rate, Erythrocyte: 29 mm/Hr <H> (07-31-18 @ 17:59)      CARDIAC MARKERS ( 31 Jul 2018 07:59 )  x     / <0.01 ng/mL / 53 U/L / x     / 1.9 ng/mL  CARDIAC MARKERS ( 30 Jul 2018 21:23 )  x     / <0.01 ng/mL / 53 U/L / x     / 1.7 ng/mL      RADIOLOGY:    PHYSICAL EXAM:  GEN: No acute distress  LUNGS: Clear to auscultation bilaterally   HEART: S1/S2 present. RRR.   ABD: Soft, non-tender, non-distended. Bowel sounds present  EXT: No edema  NEURO: AAOX3, neck pain with palpation. nonfocal. bilateral shoulder pain with abduction     Assessment and Plan:    Assessment:  · Assessment		  68 yo male with PMHx of DM (diet controlled), HTN, CAD s/p CABG, was sent to the ED by neurosurgery (Dr. Diez) due to complaints of chronic neck pain (6 weeks) and MRI findings suspicious for cervical osteomyelitis.     #Possible Cervical osteomyelitis  -Daily cbc  -Id recs appreciated  Blood culture and urine culture.  Check CRP, ESR, TB Gold, HIV, 2D Echo.   Hold antibiotics for now.   CXR and MRI pending    -Consult neurosurgery regarding further recommendations  -No antibiotics indicated at the time as the patient is not septic  -Check Quantiferon test for possible TB exposure  -Get Chest X-ray to find any pulmonary TB lesion  -Airborne precautions to be in place pending Quantiferon results and chest xray.  -Continue oxycodone 10mg 2 tabs bid for pain control    #Diabetes mellitus   -Check fingerstick glucose AC/HS  -Check HbA1c  -Carb consistent diet  -Order insulin if glucose levels are consistently high ( >180mg/dl)    #HTN    -Start Losartan 50mg once a day as the patient is diabetic.  -Hold Atenolol-chlorthalidone 50/25mg as the patient was not taking it at home.  -DASH diet  - Well controlled     #Miscellaneous  -DVT ppx (Lovenox 40mg sq once daily)  -GI ppx (not indicated)  -Diet: Carb consistent DASH diet  -Activity: Ambulate as tolerated  -Full code  -Dispo home 24H events:    Patient is a 67y old Male with cervical osteomyelitis.   Today is hospital day 2d. No acute events over night. Neurosurgery planing on taking him to the OR early next week. Needs medical, cardiac and pulm clearance.     PAST MEDICAL & SURGICAL HISTORY  Coronary artery disease: s/p CABG  HTN (hypertension): On atenolol-chlorthalidone but doesnt take it  Diabetes mellitus: Diet-controlled; has taken metformin in the past as per patient  History of coronary artery bypass surgery    SOCIAL HISTORY:  Negative for smoking/alcohol/drug use.     ALLERGIES:  No Known Allergies    MEDICATIONS:  STANDING MEDICATIONS  aspirin  chewable 81 milliGRAM(s) Oral daily  docusate sodium 100 milliGRAM(s) Oral daily  enoxaparin Injectable 40 milliGRAM(s) SubCutaneous daily  losartan 50 milliGRAM(s) Oral daily  oxyCODONE    IR 10 milliGRAM(s) Oral two times a day  senna 2 Tablet(s) Oral at bedtime    PRN MEDICATIONS    VITALS:   T(F): 97.5  HR: 72  BP: 140/67  RR: 18  SpO2: 97%    LABS:                        15.1   5.71  )-----------( 86       ( 31 Jul 2018 07:59 )             42.4     07-31    141  |  97<L>  |  13  ----------------------------<  175<H>  4.5   |  33<H>  |  0.9    Ca    9.7      31 Jul 2018 07:59            Sedimentation Rate, Erythrocyte: 29 mm/Hr <H> (07-31-18 @ 17:59)      CARDIAC MARKERS ( 31 Jul 2018 07:59 )  x     / <0.01 ng/mL / 53 U/L / x     / 1.9 ng/mL  CARDIAC MARKERS ( 30 Jul 2018 21:23 )  x     / <0.01 ng/mL / 53 U/L / x     / 1.7 ng/mL      RADIOLOGY:    PHYSICAL EXAM:  GEN: No acute distress  LUNGS: Clear to auscultation bilaterally   HEART: S1/S2 present. RRR.   ABD: Soft, non-tender, non-distended. Bowel sounds present  EXT: No edema  NEURO: AAOX3, neck pain with palpation. nonfocal. bilateral shoulder pain with abduction     Assessment and Plan:    Assessment:  · Assessment		  66 yo male with PMHx of DM (diet controlled), HTN, CAD s/p CABG, was sent to the ED by neurosurgery (Dr. Diez) due to complaints of chronic neck pain (6 weeks) and MRI findings suspicious for cervical osteomyelitis.     #Possible Cervical osteomyelitis  -Daily cbc  -Id recs appreciated  Blood culture and urine culture.  Check CRP, ESR, TB Gold  Hold antibiotics for now  f/u cervical CT  Echo showed possible vegetations, consult cardiology for JUANA     -Consult neurosurgery regarding further recommendations  -Check Quantiferon test for possible TB exposure  -Chest X-ray no findings concerning for pulmonary TB lesion  -Continue oxycodone 10mg 2 tabs bid for pain control  - OR next week, requires cardiac, pulm and medical clearance    #Diabetes mellitus   -Check fingerstick glucose AC/HS  -Check HbA1c  -Carb consistent diet  -Order insulin if glucose levels are consistently high ( >180mg/dl)    #HTN    -Start Losartan 50mg once a day as the patient is diabetic.  -Hold Atenolol-chlorthalidone 50/25mg as the patient was not taking it at home.  -DASH diet  - Well controlled     #Miscellaneous  -DVT ppx (Lovenox 40mg sq once daily)  -GI ppx (not indicated)  -Diet: Carb consistent DASH diet  -Activity: Ambulate as tolerated  -Full code  -Dispo home

## 2018-08-01 NOTE — PROGRESS NOTE ADULT - ASSESSMENT
a/p:  #Cervical Discitis/OM  -f/u with ID--waiting on cx to consider starting abx  -repeat CT neck today with c6-c7 discitis with OM with epidural disease  -monitor wbc and fever curve  -esr elevated (29) with normal crp  -hiv negative  -f/u quantiferon gold (cxr no signs of active or prior Tb-)  -unable to assess valves on TTE for vegetations---cardiology consult to get JUANA to r/o BE  -neurosurgery planning for possible OR early next week   -pain control    #DM  -stable--check hba1c  -monitor fs closely- diet controlled---insulin as needed    #HTN- cont current managment- stable    #CAD- cont asa, statin, arb- cardiology eval and recomm (also for need for JUANA)    #DVT/GI ppx

## 2018-08-01 NOTE — PROGRESS NOTE ADULT - SUBJECTIVE AND OBJECTIVE BOX
Patient is a 67y old  Male who presents with a chief complaint of Chronic pain in the neck (6 weeks) (30 Jul 2018 11:42)    HPI:  68 yo male with PMHx of DM (diet controlled), HTN, CAD s/p CABG, presents to the ED with worsening pain in the neck and upper back. Pain is present since the past 6 weeks, in the neck and upper back and the shoulders b/l, now radiating to the left shoulder since the last 5-6 days. He describes the pain as steady and dull usually but becomes sharp during movement, rates '20/10' in intensity, aggravated by the movement, partially relieved for about 5-6 hours with Oxycodone (10mg 2 tabs twice a day). No visible skin changes in the back, no history of trauma, no stiffness. No previous episodes of similar pain. Patient denies any history of fever, chills, rigor, dizziness or LOC. No history of loss of appetite, fatigue. Patient reports history of night sweats but did not see that as anything abnormal (before the pain started). Patient reports tingling sensation in right hand fingers at times but no weakness or numbness anywhere. No vision and hearing changes. Patient has history of travel last february to [facility], [facility], [place] and [facility]. No history of any sick contacts. Patient has had 3 MRIs of the cervical spine (last 07/24/2018) because of chronic neck pain and was referred by Dr. Sunita Diez (neurosurgery) for inpatient treatment to rule out osteomyelitis based on MRI findings suspicious for osteomyelitis.  Patient has a CD disc with his MRI results (put in his ED chart) (30 Jul 2018 11:42)    PAST MEDICAL & SURGICAL HISTORY:  Coronary artery disease: s/p CABG  HTN (hypertension): On atenolol-chlorthalidone but doesnt take it  Diabetes mellitus: Diet-controlled; has taken metformin in the past as per patient  History of coronary artery bypass surgery    no overnight events---still c/o neck/upper back pain-     Vital Signs Last 24 Hrs  T(C): 36 (01 Aug 2018 12:04), Max: 36.7 (31 Jul 2018 19:45)  T(F): 96.8 (01 Aug 2018 12:04), Max: 98.1 (31 Jul 2018 19:45)  HR: 67 (01 Aug 2018 12:04) (67 - 72)  BP: 136/76 (01 Aug 2018 12:04) (136/76 - 145/70)  BP(mean): --  RR: 19 (01 Aug 2018 12:04) (18 - 19)  SpO2: 97% (31 Jul 2018 20:00) (97% - 97%)             PE:  GEN-NAD, AAOx3  HEENT- NCAT, PERRLA, EOMI  NECK- supple decreased rom 2/2 pain but no nuchal rigidity  PULM- Clear to auscultation bilaterally  CVS- +s1/s2 RRR no murmurs  GI- soft NT ND +bs, no rebound, no guarding  EXT- no edema               15.7   7.11  )-----------( 103      ( 01 Aug 2018 09:11 )             44.5     08-01    143  |  99  |  14  ----------------------------<  246<H>  3.7   |  30  |  1.0    Ca    9.6      01 Aug 2018 09:11    TPro  6.7  /  Alb  4.3  /  TBili  0.8  /  DBili  x   /  AST  16  /  ALT  12  /  AlkPhos  89  08-01    CARDIAC MARKERS ( 31 Jul 2018 07:59 )  x     / <0.01 ng/mL / 53 U/L / x     / 1.9 ng/mL  CARDIAC MARKERS ( 30 Jul 2018 21:23 )  x     / <0.01 ng/mL / 53 U/L / x     / 1.7 ng/mL            MEDICATIONS  (STANDING):  aspirin  chewable 81 milliGRAM(s) Oral daily  docusate sodium 100 milliGRAM(s) Oral daily  enoxaparin Injectable 40 milliGRAM(s) SubCutaneous daily  losartan 50 milliGRAM(s) Oral daily  oxyCODONE    IR 10 milliGRAM(s) Oral two times a day  senna 2 Tablet(s) Oral at bedtime

## 2018-08-01 NOTE — CONSULT NOTE ADULT - SUBJECTIVE AND OBJECTIVE BOX
Chief complaint:  neck pain  HPI:  66 yo male with PMHx of DM (diet controlled), HTN, CAD s/p CABG, presents to the ED with worsening pain in the neck and upper back. Pain is present since the past 6 weeks, in the neck and upper back and the shoulders b/l, now radiating to the left shoulder since the last 5-6 days. He describes the pain as steady and dull usually but becomes sharp during movement, rates '20/10' in intensity, aggravated by the movement, partially relieved for about 5-6 hours with Oxycodone (10mg 2 tabs twice a day). CT cervical spine shows evidenace of OM, TTE showed possible vegetation, cardiology was called for evaluation.     ROS:  Constitutional: No fever, chill, sweats  Eye: No recent visual problem  ENMT: No ear pain, nasal congestion, throat pain  Respiratoty: No SOB, cough  Cardiovascular: No chest pain, palpitaion, syncope  Gastrointestinal: No nausea, vomitting, diarhea  Genitourinary: No dysuria, hematuria  Heam/Lymp: No brusing tendency, no swollen glands  Endocrine: Negative for excessive hunger, thirst  Musculoskeletal: + neck pain, back pain, joint pain  Intergumentory: No rash, skin lesions  Neurologic: alert and oriented    PAST MEDICAL & SURGICAL HISTORY  Coronary artery disease: s/p CABG  HTN (hypertension): On atenolol-chlorthalidone but doesnt take it  Diabetes mellitus: Diet-controlled; has taken metformin in the past as per patient  History of coronary artery bypass surgery    FAMILY HISTORY:  FAMILY HISTORY:  Family history of heart disease (Mother)    SOCIAL HISTORY:  Denies smoking, alcohol    ALLERGIES:  No Known Allergies    MEDICATIONS:  MEDICATIONS  (STANDING):  aspirin  chewable 81 milliGRAM(s) Oral daily  docusate sodium 100 milliGRAM(s) Oral daily  enoxaparin Injectable 40 milliGRAM(s) SubCutaneous daily  losartan 50 milliGRAM(s) Oral daily  oxyCODONE    IR 10 milliGRAM(s) Oral two times a day  senna 2 Tablet(s) Oral at bedtime    HOME MEDICATIONS:  Home Medications:  aspirin 325 mg oral tablet: 1 tab(s) orally once a day (31 Jul 2018 02:07)  atenolol-chlorthalidone 50 mg-25 mg oral tablet: 1 tab(s) orally once a day; non compliant (30 Jul 2018 11:56)  oxyCODONE 10 mg oral tablet, extended release: 10 milligram(s) orally 2 tabs 2 times a day (30 Jul 2018 11:56)      VITALS:   T(F): 97 (08-01 @ 21:05), Max: 98.1 (07-31 @ 19:45)  HR: 71 (08-01 @ 21:05) (63 - 80)  BP: 157/87 (08-01 @ 21:05) (132/71 - 176/77)  BP(mean): --  RR: 18 (08-01 @ 21:05) (18 - 19)  SpO2: 97% (07-31 @ 20:00) (97% - 100%)    I&O's Summary    01 Aug 2018 07:01  -  01 Aug 2018 22:03  --------------------------------------------------------  IN: 240 mL / OUT: 0 mL / NET: 240 mL        PHYSICAL EXAM:  GEN: Alert and oriented X 3, Well nourished, No acute distress  NECK: Supple, non tender, NO JVD, No carotid bruit,   LUNGS: Clear to auscultation bilaterally, non labored respiration  CARDIOVASCULAR: S1/S2 present, RRR , no murmus or rubs,   ABD: Soft, non-tender, non-distended,   EXT: No Lower extremity edema, no tenderness  NEURO: Non focal  SKIN: Intact    LABS:                        15.7   7.11  )-----------( 103      ( 01 Aug 2018 09:11 )             44.5     08-01    143  |  99  |  14  ----------------------------<  246<H>  3.7   |  30  |  1.0    Ca    9.6      01 Aug 2018 09:11    TPro  6.7  /  Alb  4.3  /  TBili  0.8  /  DBili  x   /  AST  16  /  ALT  12  /  AlkPhos  89  08-01        CARDIAC MARKERS ( 31 Jul 2018 07:59 )  x     / <0.01 ng/mL / 53 U/L / x     / 1.9 ng/mL      Hemoglobin A1C Hemoglobin A1C, Whole Blood: 8.5 % (07-31-18 @ 07:59)      RADIOLOGY:  -CXR:    < from: Xray Chest 2 Views PA/Lat (07.30.18 @ 18:47) >  mpression:      No radiographic evidence of acute cardiopulmonary disease.    < end of copied text >    -TTE:    < from: Transthoracic Echocardiogram (07.31.18 @ 08:47) >  Summary:   1. Left ventricular ejection fraction, by visual estimation, is 55 to   60%.   2. LV Ejection Fraction by Brown's Method with a biplane EF of 59 %.   3. Spectral Doppler shows impaired relaxation pattern of left   ventricular myocardial filling (Grade I diastolic dysfunction).   4. Mildly enlarged left atrium.   5. Normal right atrial size.   6. Mild mitral annular calcification.   7. Thickening of the anterior and posterior mitral valve leaflets.   8. Cannot exclude a vegetation on atrial aspect of PMVL.   9. Trace tricuspid regurgitation.  10. No evidence of aortic stenosis    < end of copied text >    ECG: < from: 12 Lead ECG (07.30.18 @ 22:10) >  Diagnosis Line Sinus bradycardia  Right bundle branch block  Inferior infarct , age undetermined  T wave abnormality, consider lateral ischemia  Abnormal ECG    < end of copied text >

## 2018-08-01 NOTE — CONSULT NOTE ADULT - ASSESSMENT
68 yo male with PMHx of DM (diet controlled), HTN, CAD s/p CABG, presents to the ED with worsening pain in the neck and upper back    Cervical OM/discitis  vegetation on PMVL  DM  CAD S/P CABG    NPO after MN  Possible JUANA in AM  No signs of IE  Bcx Negative  will d/w attending 68 yo male with PMHx of DM (diet controlled), HTN, CAD s/p CABG, presents to the ED with worsening pain in the neck and upper back    Cervical OM/discitis  vegetation on PMVL  DM  CAD S/P CABG    NPO after MN  Possible JUANA in AM  No signs of IE  Bcx Negative  will d/w attending    Attending: Patient seen and examined. D/w the fellow. Agree with findings as documented.  JUANA today to r/o BE.  Needs DM control - elevated A1c - medical therapy for DM as per primary team.  C/w ASA 81 mg for history of CAD. Recommend addition of statin, given the history of CAD, CABG and DM.  C/w Losartan. Re-start BB - patient was on atenolol at home.  Abx therapy as per ID.

## 2018-08-02 LAB
CULTURE RESULTS: NO GROWTH — SIGNIFICANT CHANGE UP
M TB TUBERC IFN-G BLD QL: 0 IU/ML — SIGNIFICANT CHANGE UP
M TB TUBERC IFN-G BLD QL: 0.04 IU/ML — SIGNIFICANT CHANGE UP
M TB TUBERC IFN-G BLD QL: NEGATIVE — SIGNIFICANT CHANGE UP
MITOGEN IGNF BCKGRD COR BLD-ACNC: 9.02 IU/ML — SIGNIFICANT CHANGE UP
SPECIMEN SOURCE: SIGNIFICANT CHANGE UP

## 2018-08-02 RX ADMIN — ENOXAPARIN SODIUM 40 MILLIGRAM(S): 100 INJECTION SUBCUTANEOUS at 14:10

## 2018-08-02 RX ADMIN — Medication 100 MILLIGRAM(S): at 14:10

## 2018-08-02 RX ADMIN — OXYCODONE HYDROCHLORIDE 10 MILLIGRAM(S): 5 TABLET ORAL at 17:52

## 2018-08-02 RX ADMIN — Medication 81 MILLIGRAM(S): at 14:10

## 2018-08-02 RX ADMIN — LOSARTAN POTASSIUM 50 MILLIGRAM(S): 100 TABLET, FILM COATED ORAL at 06:10

## 2018-08-02 NOTE — PROGRESS NOTE ADULT - ASSESSMENT
a/p:  #Cervical Discitis/OM  -f/u with ID--waiting on cx to start abx  -repeat CT neck with c6-c7 discitis with OM with epidural disease  -monitor wbc and fever curve  -esr elevated (29) with normal crp  -hiv negative  -f/u quantiferon gold (cxr no signs of active or prior Tb-)  -unable to assess valves on TTE for vegetations---cardiology consult appreciated and plan for JUANA today to r/o BE (bcx negative to date)_  -neurosurgery planning for possible OR early next week- f/u with neurosurgery plan for 2 level corpectomy with fusion   -pain control    #DM  -stable  -hba1c 8.5  -monitor fs closely- diet controlled---insulin as needed    #HTN- cont current managment- stable    #CAD- cont asa, statin, arb- cardiology recomm appreciated  -f/u JUANA    #DVT/GI ppx

## 2018-08-02 NOTE — CHART NOTE - NSCHARTNOTEFT_GEN_A_CORE
PREOPERATIVE DAY OF PROCEDURE EVALUATION:  I have personally seen and examined the patient.  I agree with the history and physical which I have reviewed and noted any changes below.  (Signed electronically by __________)  08-02-18 @ 11:50

## 2018-08-02 NOTE — PROGRESS NOTE ADULT - SUBJECTIVE AND OBJECTIVE BOX
Patient is a 67y old  Male who presents with a chief complaint of Chronic pain in the neck (6 weeks) (30 Jul 2018 11:42)    HPI:  68 yo male with PMHx of DM (diet controlled), HTN, CAD s/p CABG, presents to the ED with worsening pain in the neck and upper back. Pain is present since the past 6 weeks, in the neck and upper back and the shoulders b/l, now radiating to the left shoulder since the last 5-6 days. He describes the pain as steady and dull usually but becomes sharp during movement, rates '20/10' in intensity, aggravated by the movement, partially relieved for about 5-6 hours with Oxycodone (10mg 2 tabs twice a day). No visible skin changes in the back, no history of trauma, no stiffness. No previous episodes of similar pain. Patient denies any history of fever, chills, rigor, dizziness or LOC. No history of loss of appetite, fatigue. Patient reports history of night sweats but did not see that as anything abnormal (before the pain started). Patient reports tingling sensation in right hand fingers at times but no weakness or numbness anywhere. No vision and hearing changes. Patient has history of travel last february to [facility], [facility] and [facility]. No history of any sick contacts. Patient has had 3 MRIs of the cervical spine (last 07/24/2018) because of chronic neck pain and was referred by Dr. Sunita Diez (neurosurgery) for inpatient treatment to rule out osteomyelitis based on MRI findings suspicious for osteomyelitis.  Patient has a CD disc with his MRI results (put in his ED chart) (30 Jul 2018 11:42)    PAST MEDICAL & SURGICAL HISTORY:  Coronary artery disease: s/p CABG  HTN (hypertension): On atenolol-chlorthalidone but doesnt take it  Diabetes mellitus: Diet-controlled; has taken metformin in the past as per patient  History of coronary artery bypass surgery    no overnight events---still c/o neck/upper back pain- going for JUANA today to evaluate for veg               PE:  GEN-NAD, AAOx3  HEENT- NCAT, PERRLA, EOMI  NECK- supple decreased rom 2/2 pain but no nuchal rigidity  PULM- Clear to auscultation bilaterally  CVS- +s1/s2 RRR no murmurs  GI- soft NT ND +bs, no rebound, no guarding  EXT- no edema        Labs:                        15.7   7.11  )-----------( 103      ( 01 Aug 2018 09:11 )             44.5     CBC Full  -  ( 01 Aug 2018 09:11 )  WBC Count : 7.11 K/uL  Hemoglobin : 15.7 g/dL  Hematocrit : 44.5 %  Platelet Count - Automated : 103 K/uL  Mean Cell Volume : 94.7 fL  Mean Cell Hemoglobin : 33.4 pg  Mean Cell Hemoglobin Concentration : 35.3 g/dL  Auto Neutrophil # : x  Auto Lymphocyte # : x  Auto Monocyte # : x  Auto Eosinophil # : x  Auto Basophil # : x  Auto Neutrophil % : x  Auto Lymphocyte % : x  Auto Monocyte % : x  Auto Eosinophil % : x  Auto Basophil % : x      08-01    143  |  99  |  14  ----------------------------<  246<H>  3.7   |  30  |  1.0    Ca    9.6      01 Aug 2018 09:11    TPro  6.7  /  Alb  4.3  /  TBili  0.8  /  DBili  x   /  AST  16  /  ALT  12  /  AlkPhos  89  08-01      Microbiology:    Culture - Urine (collected 01 Aug 2018 07:20)  Source: .Urine Clean Catch (Midstream)  Final Report (02 Aug 2018 11:01):    No growth    Culture - Blood (collected 31 Jul 2018 17:59)  Source: .Blood None  Preliminary Report (02 Aug 2018 01:02):    No growth to date.        Vital Signs Last 24 Hrs  T(C): 36.3 (02 Aug 2018 12:42), Max: 36.4 (02 Aug 2018 05:59)  T(F): 97.3 (02 Aug 2018 12:42), Max: 97.5 (02 Aug 2018 05:59)  HR: 93 (02 Aug 2018 12:42) (71 - 93)  BP: 155/97 (02 Aug 2018 12:42) (147/74 - 157/87)  BP(mean): --  RR: 18 (02 Aug 2018 12:42) (18 - 18)  SpO2: 97% (02 Aug 2018 08:06) (97% - 97%)    I&O's Summary    01 Aug 2018 07:01  -  02 Aug 2018 07:00  --------------------------------------------------------  IN: 240 mL / OUT: 0 mL / NET: 240 mL

## 2018-08-02 NOTE — CHART NOTE - NSCHARTNOTEFT_GEN_A_CORE
GAMALIEL Procedure Note:     After risks, benefits and alternatives of the procedure were explained, consent was signed and placed in the medical record.  Procedural timeout was taken.  Sedation was administered by anesthesia.  GAMALIEL probe inserted without complication and GAMALIEL performed.  Patient tolerated the procedure well without complication.  Post-procedure vital signs were stable.    GAMALIEL findings discussed with patient and referring cardiologist.       GAMALIEL findings:  LVEF - nl  no vegetations seen   Gamaliel was aborted sec to technical difficulties with the probe      See full report for findings.        Recommendations:  Continue current medications.  Follow up with referring cardiologist in near future.      Shen Aldridge MD

## 2018-08-02 NOTE — PROGRESS NOTE ADULT - SUBJECTIVE AND OBJECTIVE BOX
24H events:    Patient is a 67y old Male who presents with a chief complaint of Chronic pain in the neck. Awaiting JUANA results. Pt followed by neurosurgery. No acute events over night.      Today is hospital day 3d.     PAST MEDICAL & SURGICAL HISTORY  Coronary artery disease: s/p CABG  HTN (hypertension): On atenolol-chlorthalidone but doesnt take it  Diabetes mellitus: Diet-controlled; has taken metformin in the past as per patient  History of coronary artery bypass surgery    SOCIAL HISTORY:  Negative for smoking/alcohol/drug use.     ALLERGIES:  No Known Allergies    MEDICATIONS:  STANDING MEDICATIONS  aspirin  chewable 81 milliGRAM(s) Oral daily  docusate sodium 100 milliGRAM(s) Oral daily  enoxaparin Injectable 40 milliGRAM(s) SubCutaneous daily  losartan 50 milliGRAM(s) Oral daily  oxyCODONE    IR 10 milliGRAM(s) Oral two times a day  senna 2 Tablet(s) Oral at bedtime    PRN MEDICATIONS    VITALS:   T(F): 97.3  HR: 93  BP: 155/97  RR: 18  SpO2: 97%    LABS:                        15.7   7.11  )-----------( 103      ( 01 Aug 2018 09:11 )             44.5     08-01    143  |  99  |  14  ----------------------------<  246<H>  3.7   |  30  |  1.0    Ca    9.6      01 Aug 2018 09:11    TPro  6.7  /  Alb  4.3  /  TBili  0.8  /  DBili  x   /  AST  16  /  ALT  12  /  AlkPhos  89  08-01              Culture - Urine (collected 01 Aug 2018 07:20)  Source: .Urine Clean Catch (Midstream)  Final Report (02 Aug 2018 11:01):    No growth    Culture - Blood (collected 31 Jul 2018 17:59)  Source: .Blood None  Preliminary Report (02 Aug 2018 01:02):    No growth to date.          RADIOLOGY:    PHYSICAL EXAM:  GEN: No acute distress  LUNGS: Clear to auscultation bilaterally   HEART: S1/S2 present. RRR.   ABD: Soft, non-tender, non-distended. Bowel sounds present  EXT: No edema  NEURO: AAOX3, neck pain with palpation. nonfocal. bilateral shoulder pain with abduction     Assessment and Plan:    Assessment:  · Assessment		  66 yo male with PMHx of DM (diet controlled), HTN, CAD s/p CABG, was sent to the ED by neurosurgery (Dr. Diez) due to complaints of chronic neck pain (6 weeks) and MRI findings suspicious for cervical osteomyelitis.     #Possible Cervical osteomyelitis  -Daily cbc  -Id recs appreciated  Blood culture and urine culture.  Check CRP, ESR, TB Gold  Hold antibiotics for now  f/u cervical CT  Awaiting JUANA results    Will F/u with neurosurgery regarding possible discharge and re admit.      -Chest X-ray no findings concerning for pulmonary TB lesion  - F/u QuantiFeron gold results   -Continue oxycodone 10mg 2 tabs bid for pain control  - OR next week, requires cardiac, pulm and medical clearance    #Diabetes mellitus   -Check fingerstick glucose AC/HS  -Check HbA1c  -Carb consistent diet  -Order insulin if glucose levels are consistently high ( >180mg/dl)    #HTN    -Start Losartan 50mg once a day as the patient is diabetic.  -Hold Atenolol-chlorthalidone 50/25mg as the patient was not taking it at home.  -DASH diet  - Well controlled     #Miscellaneous  -DVT ppx (Lovenox 40mg sq once daily)  -GI ppx (not indicated)  -Diet: Carb consistent DASH diet  -Activity: Ambulate as tolerated  -Full code  -Dispo home

## 2018-08-02 NOTE — PROGRESS NOTE ADULT - ASSESSMENT
66 yo male with PMHx of DM (diet controlled), HTN, CAD s/p CABG, was sent to the ED by Dr. Diez for evaluation of upper back pain.     # Upper back pain due to cervical osteomyelitis and epidural inflammation.    - CT reviewed.   - TB Gold, HIV , blood culture and urine culture are negative.   - No TB exposure identified.   - Hold antibiotics for now.   - OR for biopsy by neurosurg. later.   - Echo shows a possible vegetation on PMVL.   - Cardiology consult for possible JUANA.    #Diabetes mellitus, HTN.    # Full code

## 2018-08-02 NOTE — PROGRESS NOTE ADULT - SUBJECTIVE AND OBJECTIVE BOX
INTERVAL HPI/OVERNIGHT EVENTS:  Stable.     No Known Allergies  Sleepy,  s1s2, no wheezing, comfortable, no restriction in movement    aspirin  chewable 81 milliGRAM(s) Oral daily  enoxaparin Injectable 40 milliGRAM(s) SubCutaneous daily  losartan 50 milliGRAM(s) Oral daily  oxyCODONE    IR 10 milliGRAM(s) Oral two times a day    aspirin  chewable 81 milliGRAM(s) Oral daily  docusate sodium 100 milliGRAM(s) Oral daily  enoxaparin Injectable 40 milliGRAM(s) SubCutaneous daily  losartan 50 milliGRAM(s) Oral daily  oxyCODONE    IR 10 milliGRAM(s) Oral two times a day  senna 2 Tablet(s) Oral at bedtime      Vital Signs Last 24 Hrs  T(C): 36.3 (02 Aug 2018 12:42), Max: 36.4 (02 Aug 2018 05:59)  T(F): 97.3 (02 Aug 2018 12:42), Max: 97.5 (02 Aug 2018 05:59)  HR: 93 (02 Aug 2018 12:42) (71 - 93)  BP: 155/97 (02 Aug 2018 12:42) (147/74 - 157/87)  BP(mean): --  RR: 18 (02 Aug 2018 12:42) (18 - 18)  SpO2: 97% (02 Aug 2018 08:06) (97% - 97%)    LABS:                        15.7   7.11  )-----------( 103      ( 01 Aug 2018 09:11 )             44.5     08-01    143  |  99  |  14  ----------------------------<  246<H>  3.7   |  30  |  1.0                          15.1   5.71  )-----------( 86       ( 31 Jul 2018 07:59 )             42.4     07-31    141  |  97<L>  |  13  ----------------------------<  175<H>  4.5   |  33<H>  |  0.9    Ca    9.7      31 Jul 2018 07:59    HIV-1/2 Antigen/Antibody Screen by [facility] (07.31.18 @ 17:59)    HIV-1/2 Combo Result: Nonreact:     Quantiferon - Gold Tuberculosis (07.30.18 @ 16:20)    Quantiferon - Gold Tuberculosis Result: Negative    Hemoglobin A1C with Mean Plasma Glucose (07.31.18 @ 07:59)    Hemoglobin A1C, Whole Blood: 8.5 %    Sedimentation Rate, Erythrocyte (07.30.18 @ 16:20)    Sedimentation Rate, Erythrocyte: 16 mm/Hr    Culture - Urine (08.01.18 @ 07:20)    Specimen Source: .Urine Clean Catch (Midstream)    Culture Results:   No growth    Culture - Blood (07.31.18 @ 17:59)    Specimen Source: .Blood None    Culture Results:   No growth to date.    XR CHEST PA LAT 2V:  07/30/2018    No radiographic evidence of acute cardiopulmonary disease.     2-D ECHO (TTE) COMPLETE:  07/31/2018     1. Left ventricular ejection fraction, by visual estimation, is 55 to   60%.   2. LV Ejection Fraction by Brown's Method with a biplane EF of 59 %.   3. Spectral Doppler shows impaired relaxation pattern of left   ventricular myocardial filling (Grade I diastolic dysfunction).   4. Mildly enlarged left atrium.   5. Normal right atrial size.   6. Mild mitral annular calcification.   7. Thickening of the anterior and posterior mitral valve leaflets.   8. Cannot exclude a vegetation on atrial aspect of PMVL.   9. Trace tricuspid regurgitation.  10. No evidence of aortic stenosis.    CT CERVICAL SPINE:  08/01/2018      There are destructive erosive changes across the C6-7 disc space with   mild loss of vertebralbody height. There may also be erosive changes   across the C7 T1 disc space posteriorly for example series 602 image 47.    There is thickening of the prevertebral soft tissues C5-T1 without gross   evidence of discrete drainable fluid collection. There is also thickening   of the ventral epidural soft tissues from C4 through T1 likely reflecting   epidural inflammation. This measures up to 4 mm in thickness.    Disc space gas is noted C5-6 and C7-T1.    At C2-3 there is no significant disc herniation, canal or foraminal   stenosis.    At C3-4 there is small disc osteophyte indenting the ventral thecal sac,   ligamentum flavum infolding indenting the dorsal thecal sac and uncinate   and facet hypertrophy with mild foraminal stenosis.    At C4-5 there is disc osteophyte indenting the ventral thecal sac,   ligamentum flavum infolding indenting the dorsal thecal sac and uncinate   and facet hypertrophy with mild foraminal stenosis.    At C5-6 there is disc osteophyte indenting the ventral thecal sac and   uncinate and facet hypertrophy with moderate to severe foraminal stenosis   worse on the left.    At C6-7 there is disc osteophyte indenting the ventral thecal sac and   uncinate spurring with mild foraminal narrowing.    At C7-T1 there is uncinate spurring with mild foraminal narrowing.    IMPRESSION:    1.  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    2.  There may be early involvement of the C7-T1 disc space, recommend   correlation with the patient's previous outside MRI.    3.  Multilevel degenerative changes as described.      Outpt MRI images (not seen personally):  +  contrast enhancement and disc destruction of cervical spine with epidural tissue, resulting in thecal sac impingement.

## 2018-08-03 RX ADMIN — OXYCODONE HYDROCHLORIDE 10 MILLIGRAM(S): 5 TABLET ORAL at 17:26

## 2018-08-03 RX ADMIN — LOSARTAN POTASSIUM 50 MILLIGRAM(S): 100 TABLET, FILM COATED ORAL at 05:40

## 2018-08-03 RX ADMIN — ENOXAPARIN SODIUM 40 MILLIGRAM(S): 100 INJECTION SUBCUTANEOUS at 11:28

## 2018-08-03 RX ADMIN — Medication 81 MILLIGRAM(S): at 11:28

## 2018-08-03 RX ADMIN — Medication 100 MILLIGRAM(S): at 11:28

## 2018-08-03 RX ADMIN — OXYCODONE HYDROCHLORIDE 10 MILLIGRAM(S): 5 TABLET ORAL at 05:40

## 2018-08-03 NOTE — PROGRESS NOTE ADULT - ASSESSMENT
a/p:  #Cervical Discitis/OM  -f/u with ID--keep off abx until obtain cx  -repeat CT neck with c6-c7 discitis with OM with epidural disease  -monitor wbc and fever curve  -esr elevated (29) with normal crp  -hiv negative  -quantiferon negative  -JUANA done 8/2---negative for veg but incomplete study 2/2 difficulty with probe  -neurosurgery planning for OR next week--if monday keep npo after midnight sunday and obtain cardiac clearance---patient with good ET at baseline (able to ambulate well with >4 METS)- no prior reaction to anaesthesia---RCRI score 1 with 0.9% risk for cardiac death/nonfatal MI/nonfatal cardiac arrest - patient intermediate risk for moderate cervical neurosurgical procedure- f/u with neurosurgery plan for 2 level corpectomy with fusion next week  -pain control    #DM  -stable  -hba1c 8.5  -monitor fs closely- diet controlled---insulin as needed    #HTN- cont current managment- stable    #CAD- cont asa, statin, arb- cardiology recomm appreciated  -cardiology following    #DVT/GI ppx

## 2018-08-03 NOTE — PROGRESS NOTE ADULT - ASSESSMENT
No acute cardiac contraindications to the planned surgery.  JUANA noted - no vegetations.  Preserved LV function.  S/p CABG - stable clinically, no anginal symptoms.    C/w medical therapy, BP control. Consider addition of statin if no contraindications.  Intermediate perioperative risk, given the history of CAD/CABG.

## 2018-08-03 NOTE — PROGRESS NOTE ADULT - SUBJECTIVE AND OBJECTIVE BOX
INTERVAL HPI/OVERNIGHT EVENTS:  S/P JUANA    No Known Allergies  Sleepy,  s1s2, no wheezing, comfortable, no restriction in movement  Discussed all negative work up in detail.     aspirin  chewable 81 milliGRAM(s) Oral daily  enoxaparin Injectable 40 milliGRAM(s) SubCutaneous daily  losartan 50 milliGRAM(s) Oral daily  oxyCODONE    IR 10 milliGRAM(s) Oral two times a day    Vital Signs Last 24 Hrs  T(C): 35.1 (03 Aug 2018 12:21), Max: 36.5 (02 Aug 2018 20:59)  T(F): 95.2 (03 Aug 2018 12:21), Max: 97.7 (02 Aug 2018 20:59)  HR: 64 (03 Aug 2018 12:21) (60 - 70)  BP: 148/77 (03 Aug 2018 12:21) (134/70 - 158/72)  BP(mean): --  RR: 16 (03 Aug 2018 12:21) (16 - 16)  SpO2: 98% (03 Aug 2018 08:34) (98% - 98%)                            15.7   7.11  )-----------( 103      ( 01 Aug 2018 09:11 )             44.5     08-01    143  |  99  |  14  ----------------------------<  246<H>  3.7   |  30  |  1.0                          15.1   5.71  )-----------( 86       ( 31 Jul 2018 07:59 )             42.4     07-31    141  |  97<L>  |  13  ----------------------------<  175<H>  4.5   |  33<H>  |  0.9    Ca    9.7      31 Jul 2018 07:59    HIV-1/2 Antigen/Antibody Screen by [facility] (07.31.18 @ 17:59)    HIV-1/2 Combo Result: Nonreact:     Quantiferon - Gold Tuberculosis (07.30.18 @ 16:20)    Quantiferon - Gold Tuberculosis Result: Negative    Hemoglobin A1C with Mean Plasma Glucose (07.31.18 @ 07:59)    Hemoglobin A1C, Whole Blood: 8.5 %    Sedimentation Rate, Erythrocyte (07.30.18 @ 16:20)    Sedimentation Rate, Erythrocyte: 16 mm/Hr    Culture - Urine (08.01.18 @ 07:20)    Specimen Source: .Urine Clean Catch (Midstream)    Culture Results:   No growth    Culture - Blood (07.31.18 @ 17:59)    Specimen Source: .Blood None    Culture Results:   No growth to date.    XR CHEST PA LAT 2V:  07/30/2018    No radiographic evidence of acute cardiopulmonary disease.     2-D ECHO (TTE) COMPLETE:  07/31/2018     1. Left ventricular ejection fraction, by visual estimation, is 55 to   60%.   2. LV Ejection Fraction by Brown's Method with a biplane EF of 59 %.   3. Spectral Doppler shows impaired relaxation pattern of left   ventricular myocardial filling (Grade I diastolic dysfunction).   4. Mildly enlarged left atrium.   5. Normal right atrial size.   6. Mild mitral annular calcification.   7. Thickening of the anterior and posterior mitral valve leaflets.   8. Cannot exclude a vegetation on atrial aspect of PMVL.   9. Trace tricuspid regurgitation.  10. No evidence of aortic stenosis.    CT CERVICAL SPINE:  08/01/2018      There are destructive erosive changes across the C6-7 disc space with   mild loss of vertebralbody height. There may also be erosive changes   across the C7 T1 disc space posteriorly for example series 602 image 47.    There is thickening of the prevertebral soft tissues C5-T1 without gross   evidence of discrete drainable fluid collection. There is also thickening   of the ventral epidural soft tissues from C4 through T1 likely reflecting   epidural inflammation. This measures up to 4 mm in thickness.    Disc space gas is noted C5-6 and C7-T1.    At C2-3 there is no significant disc herniation, canal or foraminal   stenosis.    At C3-4 there is small disc osteophyte indenting the ventral thecal sac,   ligamentum flavum infolding indenting the dorsal thecal sac and uncinate   and facet hypertrophy with mild foraminal stenosis.    At C4-5 there is disc osteophyte indenting the ventral thecal sac,   ligamentum flavum infolding indenting the dorsal thecal sac and uncinate   and facet hypertrophy with mild foraminal stenosis.    At C5-6 there is disc osteophyte indenting the ventral thecal sac and   uncinate and facet hypertrophy with moderate to severe foraminal stenosis   worse on the left.    At C6-7 there is disc osteophyte indenting the ventral thecal sac and   uncinate spurring with mild foraminal narrowing.    At C7-T1 there is uncinate spurring with mild foraminal narrowing.    IMPRESSION:    1.  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    2.  There may be early involvement of the C7-T1 disc space, recommend   correlation with the patient's previous outside MRI.    3.  Multilevel degenerative changes as described.    JUANA: 08/02/2018       1. Incomplete study secondary to system malfunction with sudden machine   shut down but no evidence of valvular vegetations.   2. Normal left ventricular size and wall thicknesses, with normal   systolic function.   3. Left ventricular ejection fraction, by visual estimation, is 50 to   55%.    Outpt MRI images (not seen personally):  +  contrast enhancement and disc destruction of cervical spine with epidural tissue, resulting in thecal sac impingement.

## 2018-08-03 NOTE — PROGRESS NOTE ADULT - ASSESSMENT
66 yo male with PMHx of DM (diet controlled), HTN, CAD s/p CABG, was sent to the ED by Dr. Diez for evaluation of upper back pain.     # Upper back pain due to cervical osteomyelitis and epidural inflammation.    - CT reviewed.   - TB Gold, HIV , blood culture and urine culture are negative.   - Hold antibiotics for now.   - OR for biopsy by neurosurg. on Monday  - Echo shows a possible vegetation on PMVL.   - JUANA negative for vegetations.   - Will need PICC     #Diabetes mellitus, HTN.    # Full code

## 2018-08-03 NOTE — PROGRESS NOTE ADULT - SUBJECTIVE AND OBJECTIVE BOX
24H events:    Patient is a 67y old Male who presents with cervical osteomyelitis.     Today is hospital day 4d. No acute events over night. Neck pain well controlled on standing oxycodone.     PAST MEDICAL & SURGICAL HISTORY  Coronary artery disease: s/p CABG  HTN (hypertension): On atenolol-chlorthalidone but doesnt take it  Diabetes mellitus: Diet-controlled; has taken metformin in the past as per patient  History of coronary artery bypass surgery    SOCIAL HISTORY:  Negative for smoking/alcohol/drug use.     ALLERGIES:  No Known Allergies    MEDICATIONS:  STANDING MEDICATIONS  aspirin  chewable 81 milliGRAM(s) Oral daily  docusate sodium 100 milliGRAM(s) Oral daily  enoxaparin Injectable 40 milliGRAM(s) SubCutaneous daily  losartan 50 milliGRAM(s) Oral daily  oxyCODONE    IR 10 milliGRAM(s) Oral two times a day  senna 2 Tablet(s) Oral at bedtime    PRN MEDICATIONS    VITALS:   T(F): 97.1  HR: 60  BP: 158/72  RR: 16  SpO2: 98%    LABS:                    Culture - Urine (collected 01 Aug 2018 07:20)  Source: .Urine Clean Catch (Midstream)  Final Report (02 Aug 2018 11:01):    No growth    Culture - Blood (collected 31 Jul 2018 17:59)  Source: .Blood None  Preliminary Report (02 Aug 2018 01:02):    No growth to date.          RADIOLOGY:    PHYSICAL EXAM:  GEN: No acute distress  LUNGS: Clear to auscultation bilaterally   HEART: S1/S2 present. RRR.   ABD: Soft, non-tender, non-distended. Bowel sounds present  EXT: No edema  NEURO: AAOX3, neck pain with palpation. nonfocal. bilateral shoulder pain with abduction     Assessment and Plan:    Assessment:  · Assessment		  68 yo male with PMHx of DM (diet controlled), HTN, CAD s/p CABG, was sent to the ED by neurosurgery (Dr. Diez) due to complaints of chronic neck pain (6 weeks) and MRI findings suspicious for cervical osteomyelitis.     #Possible Cervical osteomyelitis  -Daily cbc  -Id recs appreciated  Blood culture and urine culture.  Check CRP, ESR, TB Gold  Hold antibiotics for now  f/u cervical CT  JUANA showed no vegetations.     Neurosurgery planing on taking the pt to the OR.   Will keep the patient here till Monday      -Chest X-ray no findings concerning for pulmonary TB lesion  - F/u QuantiFeron gold results   -Continue oxycodone 10mg 2 tabs bid for pain control  - OR next week, requires cardiac,and medical clearance    #Diabetes mellitus   -Check fingerstick glucose AC/HS  -Check HbA1c  -Carb consistent diet  -Order insulin if glucose levels are consistently high ( >180mg/dl)    #HTN    -Start Losartan 50mg once a day as the patient is diabetic.  -Hold Atenolol-chlorthalidone 50/25mg as the patient was not taking it at home.  -DASH diet  - Well controlled     #Miscellaneous  -DVT ppx (Lovenox 40mg sq once daily)  -GI ppx (not indicated)  -Diet: Carb consistent DASH diet  -Activity: Ambulate as tolerated  -Full code  -Dispo home

## 2018-08-03 NOTE — PROGRESS NOTE ADULT - SUBJECTIVE AND OBJECTIVE BOX
Patient is a 67y old  Male who presents with a chief complaint of Chronic pain in the neck (6 weeks) (30 Jul 2018 11:42)    HPI:  68 yo male with PMHx of DM (diet controlled), HTN, CAD s/p CABG, presents to the ED with worsening pain in the neck and upper back. Pain is present since the past 6 weeks, in the neck and upper back and the shoulders b/l, now radiating to the left shoulder since the last 5-6 days. He describes the pain as steady and dull usually but becomes sharp during movement, rates '20/10' in intensity, aggravated by the movement, partially relieved for about 5-6 hours with Oxycodone (10mg 2 tabs twice a day). No visible skin changes in the back, no history of trauma, no stiffness. No previous episodes of similar pain. Patient denies any history of fever, chills, rigor, dizziness or LOC. No history of loss of appetite, fatigue. Patient reports history of night sweats but did not see that as anything abnormal (before the pain started). Patient reports tingling sensation in right hand fingers at times but no weakness or numbness anywhere. No vision and hearing changes. Patient has history of travel last february to [facility], [facility], [place] and [facility]. No history of any sick contacts. Patient has had 3 MRIs of the cervical spine (last 07/24/2018) because of chronic neck pain and was referred by Dr. Sunita Diez (neurosurgery) for inpatient treatment to rule out osteomyelitis based on MRI findings suspicious for osteomyelitis.  Patient has a CD disc with his MRI results (put in his ED chart) (30 Jul 2018 11:42)      SUBJ:  Patient seen and examined. Recalled for pre-op clearance. No new complaints.      MEDICATIONS  (STANDING):  aspirin  chewable 81 milliGRAM(s) Oral daily  docusate sodium 100 milliGRAM(s) Oral daily  enoxaparin Injectable 40 milliGRAM(s) SubCutaneous daily  losartan 50 milliGRAM(s) Oral daily  oxyCODONE    IR 10 milliGRAM(s) Oral two times a day  senna 2 Tablet(s) Oral at bedtime    MEDICATIONS  (PRN):            Vital Signs Last 24 Hrs  T(C): 35.9 (03 Aug 2018 20:25), Max: 36.5 (02 Aug 2018 20:59)  T(F): 96.7 (03 Aug 2018 20:25), Max: 97.7 (02 Aug 2018 20:59)  HR: 64 (03 Aug 2018 20:25) (60 - 70)  BP: 150/71 (03 Aug 2018 20:25) (134/70 - 158/72)  BP(mean): --  RR: 18 (03 Aug 2018 20:25) (16 - 18)  SpO2: 98% (03 Aug 2018 08:34) (98% - 98%)      PHYSICAL EXAM:    GEN: AAO x 3, NAD  HEENT: NC/AT  Neck: No JVDs  CV: Reg, S1-S2  Lungs: CTAB  Abd: Soft, non-tender  Ext: No edema      I&O's Summary  	                      BNP  RADIOLOGY & ADDITIONAL STUDIES:      IMPRESSION AND PLAN:

## 2018-08-03 NOTE — PROGRESS NOTE ADULT - SUBJECTIVE AND OBJECTIVE BOX
Patient is a 67y old  Male who presents with a chief complaint of Chronic pain in the neck (6 weeks) (30 Jul 2018 11:42)    HPI:  68 yo male with PMHx of DM (diet controlled), HTN, CAD s/p CABG, presents to the ED with worsening pain in the neck and upper back. Pain is present since the past 6 weeks, in the neck and upper back and the shoulders b/l, now radiating to the left shoulder since the last 5-6 days. He describes the pain as steady and dull usually but becomes sharp during movement, rates '20/10' in intensity, aggravated by the movement, partially relieved for about 5-6 hours with Oxycodone (10mg 2 tabs twice a day). No visible skin changes in the back, no history of trauma, no stiffness. No previous episodes of similar pain. Patient denies any history of fever, chills, rigor, dizziness or LOC. No history of loss of appetite, fatigue. Patient reports history of night sweats but did not see that as anything abnormal (before the pain started). Patient reports tingling sensation in right hand fingers at times but no weakness or numbness anywhere. No vision and hearing changes. Patient has history of travel last february to [facility], [facility] and [facility]. No history of any sick contacts. Patient has had 3 MRIs of the cervical spine (last 07/24/2018) because of chronic neck pain and was referred by Dr. Sunita Diez (neurosurgery) for inpatient treatment to rule out osteomyelitis based on MRI findings suspicious for osteomyelitis.  Patient has a CD disc with his MRI results (put in his ED chart) (30 Jul 2018 11:42)    PAST MEDICAL & SURGICAL HISTORY:  Coronary artery disease: s/p CABG  HTN (hypertension): On atenolol-chlorthalidone but doesnt take it  Diabetes mellitus: Diet-controlled; has taken metformin in the past as per patient  History of coronary artery bypass surgery    no overnight events---still c/o neck/upper back pain but stable- awaiitng neurosurgery recomm for plan for surgery              PE:  GEN-NAD, AAOx3  HEENT- NCAT, PERRLA, EOMI  NECK- supple decreased rom 2/2 pain but no nuchal rigidity  PULM- Clear to auscultation bilaterally  CVS- +s1/s2 RRR no murmurs  GI- soft NT ND +bs, no rebound, no guarding  EXT- no edema        Labs:                Microbiology:    Culture - Urine (collected 01 Aug 2018 07:20)  Source: .Urine Clean Catch (Midstream)  Final Report (02 Aug 2018 11:01):    No growth    Culture - Blood (collected 31 Jul 2018 17:59)  Source: .Blood None  Preliminary Report (02 Aug 2018 01:02):    No growth to date.        Vital Signs Last 24 Hrs  T(C): 35.1 (03 Aug 2018 12:21), Max: 36.5 (02 Aug 2018 20:59)  T(F): 95.2 (03 Aug 2018 12:21), Max: 97.7 (02 Aug 2018 20:59)  HR: 64 (03 Aug 2018 12:21) (60 - 70)  BP: 148/77 (03 Aug 2018 12:21) (134/70 - 158/72)  BP(mean): --  RR: 16 (03 Aug 2018 12:21) (16 - 16)  SpO2: 98% (03 Aug 2018 08:34) (98% - 98%)    I&O's Summary

## 2018-08-04 LAB
HCT VFR BLD CALC: 41 % — LOW (ref 42–52)
HGB BLD-MCNC: 14.7 G/DL — SIGNIFICANT CHANGE UP (ref 14–18)
MCHC RBC-ENTMCNC: 33.6 PG — HIGH (ref 27–31)
MCHC RBC-ENTMCNC: 35.9 G/DL — SIGNIFICANT CHANGE UP (ref 32–37)
MCV RBC AUTO: 93.8 FL — SIGNIFICANT CHANGE UP (ref 80–94)
NRBC # BLD: 0 /100 WBCS — SIGNIFICANT CHANGE UP (ref 0–0)
PLATELET # BLD AUTO: 87 K/UL — LOW (ref 130–400)
RBC # BLD: 4.37 M/UL — LOW (ref 4.7–6.1)
RBC # FLD: 12 % — SIGNIFICANT CHANGE UP (ref 11.5–14.5)
WBC # BLD: 5.85 K/UL — SIGNIFICANT CHANGE UP (ref 4.8–10.8)
WBC # FLD AUTO: 5.85 K/UL — SIGNIFICANT CHANGE UP (ref 4.8–10.8)

## 2018-08-04 RX ORDER — DEXTROSE 50 % IN WATER 50 %
15 SYRINGE (ML) INTRAVENOUS ONCE
Qty: 0 | Refills: 0 | Status: DISCONTINUED | OUTPATIENT
Start: 2018-08-04 | End: 2018-08-06

## 2018-08-04 RX ORDER — DEXTROSE 50 % IN WATER 50 %
12.5 SYRINGE (ML) INTRAVENOUS ONCE
Qty: 0 | Refills: 0 | Status: DISCONTINUED | OUTPATIENT
Start: 2018-08-04 | End: 2018-08-06

## 2018-08-04 RX ORDER — DEXTROSE 50 % IN WATER 50 %
25 SYRINGE (ML) INTRAVENOUS ONCE
Qty: 0 | Refills: 0 | Status: DISCONTINUED | OUTPATIENT
Start: 2018-08-04 | End: 2018-08-06

## 2018-08-04 RX ORDER — SODIUM CHLORIDE 9 MG/ML
1000 INJECTION, SOLUTION INTRAVENOUS
Qty: 0 | Refills: 0 | Status: DISCONTINUED | OUTPATIENT
Start: 2018-08-04 | End: 2018-08-06

## 2018-08-04 RX ORDER — GLUCAGON INJECTION, SOLUTION 0.5 MG/.1ML
1 INJECTION, SOLUTION SUBCUTANEOUS ONCE
Qty: 0 | Refills: 0 | Status: DISCONTINUED | OUTPATIENT
Start: 2018-08-04 | End: 2018-08-06

## 2018-08-04 RX ORDER — INSULIN LISPRO 100/ML
VIAL (ML) SUBCUTANEOUS
Qty: 0 | Refills: 0 | Status: DISCONTINUED | OUTPATIENT
Start: 2018-08-04 | End: 2018-08-06

## 2018-08-04 RX ORDER — ATORVASTATIN CALCIUM 80 MG/1
40 TABLET, FILM COATED ORAL AT BEDTIME
Qty: 0 | Refills: 0 | Status: DISCONTINUED | OUTPATIENT
Start: 2018-08-04 | End: 2018-08-06

## 2018-08-04 RX ADMIN — Medication 100 MILLIGRAM(S): at 12:08

## 2018-08-04 RX ADMIN — OXYCODONE HYDROCHLORIDE 10 MILLIGRAM(S): 5 TABLET ORAL at 17:34

## 2018-08-04 RX ADMIN — SENNA PLUS 2 TABLET(S): 8.6 TABLET ORAL at 21:11

## 2018-08-04 RX ADMIN — Medication 2: at 17:33

## 2018-08-04 RX ADMIN — Medication 81 MILLIGRAM(S): at 12:09

## 2018-08-04 RX ADMIN — OXYCODONE HYDROCHLORIDE 10 MILLIGRAM(S): 5 TABLET ORAL at 05:07

## 2018-08-04 RX ADMIN — ENOXAPARIN SODIUM 40 MILLIGRAM(S): 100 INJECTION SUBCUTANEOUS at 12:09

## 2018-08-04 RX ADMIN — LOSARTAN POTASSIUM 50 MILLIGRAM(S): 100 TABLET, FILM COATED ORAL at 05:07

## 2018-08-04 RX ADMIN — ATORVASTATIN CALCIUM 40 MILLIGRAM(S): 80 TABLET, FILM COATED ORAL at 21:12

## 2018-08-04 RX ADMIN — OXYCODONE HYDROCHLORIDE 10 MILLIGRAM(S): 5 TABLET ORAL at 05:40

## 2018-08-04 NOTE — PROGRESS NOTE ADULT - SUBJECTIVE AND OBJECTIVE BOX
24H events:    Patient is a 67y old Male who presents with a chief complaint of Chronic pain in the neck (6 weeks) (30 Jul 2018 11:42)    Primary diagnosis of Osteomyelitis     Today is hospital day 5d. No acute events over night. Awaiting surgery by nsx. Cleared by cardiology for surgery.     PAST MEDICAL & SURGICAL HISTORY  Coronary artery disease: s/p CABG  HTN (hypertension): On atenolol-chlorthalidone but doesnt take it  Diabetes mellitus: Diet-controlled; has taken metformin in the past as per patient  History of coronary artery bypass surgery    SOCIAL HISTORY:  Negative for smoking/alcohol/drug use.     ALLERGIES:  No Known Allergies    MEDICATIONS:  STANDING MEDICATIONS  aspirin  chewable 81 milliGRAM(s) Oral daily  docusate sodium 100 milliGRAM(s) Oral daily  enoxaparin Injectable 40 milliGRAM(s) SubCutaneous daily  losartan 50 milliGRAM(s) Oral daily  oxyCODONE    IR 10 milliGRAM(s) Oral two times a day  senna 2 Tablet(s) Oral at bedtime    PRN MEDICATIONS    VITALS:   T(F): 97  HR: 74  BP: 156/87  RR: 18  SpO2: 97%    LABS:                        RADIOLOGY:    PHYSICAL EXAM:  GEN: No acute distress  LUNGS: Clear to auscultation bilaterally   HEART: S1/S2 present. RRR.   ABD: Soft, non-tender, non-distended. Bowel sounds present  EXT: No edema  NEURO: AAOX3, neck pain with palpation. nonfocal. bilateral shoulder pain with abduction     Assessment and Plan:    Assessment:  · Assessment		  68 yo male with PMHx of DM (diet controlled), HTN, CAD s/p CABG, was sent to the ED by neurosurgery (Dr. Diez) due to complaints of chronic neck pain (6 weeks) and MRI findings suspicious for cervical osteomyelitis.     #Possible Cervical osteomyelitis  -Id recs appreciated  Blood culture and urine culture.  Check CRP, ESR, TB Gold  Hold antibiotics for now  Cleared by cardiology for surgery  OR by NSX on Monday  Needs a PICC after the surgery for abx therapy   Pain well controlled on current pain regimen          -Chest X-ray no findings concerning for pulmonary TB lesion  - F/u QuantiFeron gold results   -Continue oxycodone 10mg 2 tabs bid for pain control    #Diabetes mellitus   -Check fingerstick glucose AC/HS  -Check HbA1c  -Carb consistent diet  -Order insulin if glucose levels are consistently high ( >180mg/dl)    #HTN    -Start Losartan 50mg once a day as the patient is diabetic.  -Hold Atenolol-chlorthalidone 50/25mg as the patient was not taking it at home.  -DASH diet  - Well controlled     #Miscellaneous  -DVT ppx (Lovenox 40mg sq once daily)  -GI ppx (not indicated)  -Diet: Carb consistent DASH diet  -Activity: Ambulate as tolerated  -Full code

## 2018-08-04 NOTE — PROGRESS NOTE ADULT - ASSESSMENT
a/p:  #Cervical Discitis/OM  -f/u with ID--keep off abx until obtain surgical culture  -repeat CT neck with c6-c7 discitis with OM with epidural disease  -monitor wbc and fever curve  -esr elevated (29) with normal crp  -hiv negative  -quantiferon negative  -JUANA done 8/2---negative for veg but incomplete study 2/2 difficulty with probe  -neurosurgery planning for OR next week-keep npo after midnight sunday and obtain cardiac clearance---patient with good ET at baseline (able to ambulate well with >4 METS)- no prior reaction to anaesthesia---RCRI score 1 with 0.9% risk for cardiac death/nonfatal MI/nonfatal cardiac arrest - patient intermediate risk for moderate cervical neurosurgical procedure- f/u with neurosurgery plan for 2 level corpectomy with fusion next week  -pain control  -check labs in am (pre-op) including type and screen     #DM  -stable  -hba1c 8.5  -monitor fs closely- diet controlled---insulin as needed    #HTN- cont current managment- stable    #CAD- cont asa, statin, arb- cardiology recomm appreciated  -cardiology following    #DVT/GI ppx a/p:  #Cervical Discitis/OM  -f/u with ID--keep off abx until obtain surgical culture  -repeat CT neck with c6-c7 discitis with OM with epidural disease  -monitor wbc and fever curve  -esr elevated (29) with normal crp  -hiv negative  -quantiferon negative  -JUANA done 8/2---negative for veg but incomplete study 2/2 difficulty with probe  -neurosurgery planning for OR next week-keep npo after midnight sunday and obtain cardiac clearance---patient with good ET at baseline (able to ambulate well with >4 METS)- no prior reaction to anaesthesia---RCRI score 1 with 0.9% risk for cardiac death/nonfatal MI/nonfatal cardiac arrest - patient intermediate perioperative risk for moderate cervical neurosurgical procedure- f/u with neurosurgery plan for 2 level corpectomy with fusion next week  -pain control  -check labs in am (pre-op) including type and screen     #DM  -stable  -hba1c 8.5  -monitor fs closely- diet controlled---insulin as needed    #HTN- cont current managment- stable    #CAD- cont asa, add statin, arb- cardiology recomm appreciated  -cardiology following    #DVT/GI ppx

## 2018-08-04 NOTE — PROGRESS NOTE ADULT - SUBJECTIVE AND OBJECTIVE BOX
Patient is a 67y old  Male who presents with a chief complaint of Chronic pain in the neck (6 weeks) (30 Jul 2018 11:42)    HPI:  66 yo male with PMHx of DM (diet controlled), HTN, CAD s/p CABG, presents to the ED with worsening pain in the neck and upper back. Pain is present since the past 6 weeks, in the neck and upper back and the shoulders b/l, now radiating to the left shoulder since the last 5-6 days. He describes the pain as steady and dull usually but becomes sharp during movement, rates '20/10' in intensity, aggravated by the movement, partially relieved for about 5-6 hours with Oxycodone (10mg 2 tabs twice a day). No visible skin changes in the back, no history of trauma, no stiffness. No previous episodes of similar pain. Patient denies any history of fever, chills, rigor, dizziness or LOC. No history of loss of appetite, fatigue. Patient reports history of night sweats but did not see that as anything abnormal (before the pain started). Patient reports tingling sensation in right hand fingers at times but no weakness or numbness anywhere. No vision and hearing changes. Patient has history of travel last february to [facility], [facility] and [facility]. No history of any sick contacts. Patient has had 3 MRIs of the cervical spine (last 07/24/2018) because of chronic neck pain and was referred by Dr. Sunita Diez (neurosurgery) for inpatient treatment to rule out osteomyelitis based on MRI findings suspicious for osteomyelitis.  Patient has a CD disc with his MRI results (put in his ED chart) (30 Jul 2018 11:42)    PAST MEDICAL & SURGICAL HISTORY:  Coronary artery disease: s/p CABG  HTN (hypertension): On atenolol-chlorthalidone but doesnt take it  Diabetes mellitus: Diet-controlled; has taken metformin in the past as per patient  History of coronary artery bypass surgery    no overnight events---awaiting neurosurgery OR early next week (monday)             PE:  GEN-NAD, AAOx3  HEENT- NCAT, PERRLA, EOMI  NECK- supple decreased rom 2/2 pain but no nuchal rigidity  PULM- Clear to auscultation bilaterally  CVS- +s1/s2 RRR no murmurs  GI- soft NT ND +bs, no rebound, no guarding  EXT- no edema        Labs:                Microbiology:      Vital Signs Last 24 Hrs  T(C): 36.9 (04 Aug 2018 13:05), Max: 36.9 (04 Aug 2018 13:05)  T(F): 98.4 (04 Aug 2018 13:05), Max: 98.4 (04 Aug 2018 13:05)  HR: 69 (04 Aug 2018 13:05) (64 - 74)  BP: 159/77 (04 Aug 2018 13:05) (150/71 - 159/77)  BP(mean): --  RR: 18 (04 Aug 2018 13:05) (18 - 18)  SpO2: 97% (04 Aug 2018 07:59) (97% - 97%)    I&O's Summary

## 2018-08-05 LAB
ALBUMIN SERPL ELPH-MCNC: 4.3 G/DL — SIGNIFICANT CHANGE UP (ref 3.5–5.2)
ALP SERPL-CCNC: 85 U/L — SIGNIFICANT CHANGE UP (ref 30–115)
ALT FLD-CCNC: 18 U/L — SIGNIFICANT CHANGE UP (ref 0–41)
ANION GAP SERPL CALC-SCNC: 12 MMOL/L — SIGNIFICANT CHANGE UP (ref 7–14)
AST SERPL-CCNC: 20 U/L — SIGNIFICANT CHANGE UP (ref 0–41)
BILIRUB SERPL-MCNC: 0.6 MG/DL — SIGNIFICANT CHANGE UP (ref 0.2–1.2)
BLD GP AB SCN SERPL QL: SIGNIFICANT CHANGE UP
BUN SERPL-MCNC: 11 MG/DL — SIGNIFICANT CHANGE UP (ref 10–20)
CALCIUM SERPL-MCNC: 9.8 MG/DL — SIGNIFICANT CHANGE UP (ref 8.5–10.1)
CHLORIDE SERPL-SCNC: 100 MMOL/L — SIGNIFICANT CHANGE UP (ref 98–110)
CO2 SERPL-SCNC: 30 MMOL/L — SIGNIFICANT CHANGE UP (ref 17–32)
CREAT SERPL-MCNC: 0.9 MG/DL — SIGNIFICANT CHANGE UP (ref 0.7–1.5)
GLUCOSE SERPL-MCNC: 228 MG/DL — HIGH (ref 70–99)
HCT VFR BLD CALC: 35.7 % — LOW (ref 42–52)
HCT VFR BLD CALC: 40.6 % — LOW (ref 42–52)
HGB BLD-MCNC: 12.8 G/DL — LOW (ref 14–18)
HGB BLD-MCNC: 14.7 G/DL — SIGNIFICANT CHANGE UP (ref 14–18)
MCHC RBC-ENTMCNC: 33.5 PG — HIGH (ref 27–31)
MCHC RBC-ENTMCNC: 33.8 PG — HIGH (ref 27–31)
MCHC RBC-ENTMCNC: 35.9 G/DL — SIGNIFICANT CHANGE UP (ref 32–37)
MCHC RBC-ENTMCNC: 36.2 G/DL — SIGNIFICANT CHANGE UP (ref 32–37)
MCV RBC AUTO: 93.3 FL — SIGNIFICANT CHANGE UP (ref 80–94)
MCV RBC AUTO: 93.5 FL — SIGNIFICANT CHANGE UP (ref 80–94)
NRBC # BLD: 0 /100 WBCS — SIGNIFICANT CHANGE UP (ref 0–0)
NRBC # BLD: 0 /100 WBCS — SIGNIFICANT CHANGE UP (ref 0–0)
PLATELET # BLD AUTO: 79 K/UL — LOW (ref 130–400)
PLATELET # BLD AUTO: 88 K/UL — LOW (ref 130–400)
POTASSIUM SERPL-MCNC: 4.4 MMOL/L — SIGNIFICANT CHANGE UP (ref 3.5–5)
POTASSIUM SERPL-SCNC: 4.4 MMOL/L — SIGNIFICANT CHANGE UP (ref 3.5–5)
PROT SERPL-MCNC: 6.5 G/DL — SIGNIFICANT CHANGE UP (ref 6–8)
RBC # BLD: 3.82 M/UL — LOW (ref 4.7–6.1)
RBC # BLD: 4.35 M/UL — LOW (ref 4.7–6.1)
RBC # FLD: 12 % — SIGNIFICANT CHANGE UP (ref 11.5–14.5)
RBC # FLD: 12.1 % — SIGNIFICANT CHANGE UP (ref 11.5–14.5)
SODIUM SERPL-SCNC: 142 MMOL/L — SIGNIFICANT CHANGE UP (ref 135–146)
TYPE + AB SCN PNL BLD: SIGNIFICANT CHANGE UP
WBC # BLD: 5.61 K/UL — SIGNIFICANT CHANGE UP (ref 4.8–10.8)
WBC # BLD: 5.83 K/UL — SIGNIFICANT CHANGE UP (ref 4.8–10.8)
WBC # FLD AUTO: 5.61 K/UL — SIGNIFICANT CHANGE UP (ref 4.8–10.8)
WBC # FLD AUTO: 5.83 K/UL — SIGNIFICANT CHANGE UP (ref 4.8–10.8)

## 2018-08-05 RX ADMIN — LOSARTAN POTASSIUM 50 MILLIGRAM(S): 100 TABLET, FILM COATED ORAL at 06:43

## 2018-08-05 RX ADMIN — Medication 1: at 08:16

## 2018-08-05 RX ADMIN — Medication 1: at 17:16

## 2018-08-05 RX ADMIN — OXYCODONE HYDROCHLORIDE 10 MILLIGRAM(S): 5 TABLET ORAL at 17:16

## 2018-08-05 RX ADMIN — ENOXAPARIN SODIUM 40 MILLIGRAM(S): 100 INJECTION SUBCUTANEOUS at 11:10

## 2018-08-05 RX ADMIN — OXYCODONE HYDROCHLORIDE 10 MILLIGRAM(S): 5 TABLET ORAL at 07:20

## 2018-08-05 RX ADMIN — Medication 2: at 12:07

## 2018-08-05 RX ADMIN — SENNA PLUS 2 TABLET(S): 8.6 TABLET ORAL at 21:56

## 2018-08-05 RX ADMIN — ATORVASTATIN CALCIUM 40 MILLIGRAM(S): 80 TABLET, FILM COATED ORAL at 21:56

## 2018-08-05 RX ADMIN — Medication 100 MILLIGRAM(S): at 11:10

## 2018-08-05 RX ADMIN — OXYCODONE HYDROCHLORIDE 10 MILLIGRAM(S): 5 TABLET ORAL at 06:43

## 2018-08-05 NOTE — PROGRESS NOTE ADULT - SUBJECTIVE AND OBJECTIVE BOX
INTERVAL HPI/OVERNIGHT EVENTS:  S/P JUANA    No Known Allergies  Alert, oriented, s1s2, no wheezing, comfortable, abdomen soft, working on computer.  Uses dentures.   c/o neck pain, which is stable.               15.7   7.11  )-----------( 103      ( 01 Aug 2018 09:11 )             44.5     08-01    143  |  99  |  14  ----------------------------<  246<H>  3.7   |  30  |  1.0                          15.1   5.71  )-----------( 86       ( 31 Jul 2018 07:59 )             42.4     07-31    141  |  97<L>  |  13  ----------------------------<  175<H>  4.5   |  33<H>  |  0.9    Ca    9.7      31 Jul 2018 07:59    HIV-1/2 Antigen/Antibody Screen by [facility] (07.31.18 @ 17:59)    HIV-1/2 Combo Result: Nonreact:     Quantiferon - Gold Tuberculosis (07.30.18 @ 16:20)    Quantiferon - Gold Tuberculosis Result: Negative    Hemoglobin A1C with Mean Plasma Glucose (07.31.18 @ 07:59)    Hemoglobin A1C, Whole Blood: 8.5 %    Sedimentation Rate, Erythrocyte (07.30.18 @ 16:20)    Sedimentation Rate, Erythrocyte: 16 mm/Hr    Culture - Urine (08.01.18 @ 07:20)    Specimen Source: .Urine Clean Catch (Midstream)    Culture Results:   No growth    Culture - Blood (07.31.18 @ 17:59)    Specimen Source: .Blood None    Culture Results:   No growth to date.    XR CHEST PA LAT 2V:  07/30/2018    No radiographic evidence of acute cardiopulmonary disease.     2-D ECHO (TTE) COMPLETE:  07/31/2018     1. Left ventricular ejection fraction, by visual estimation, is 55 to   60%.   2. LV Ejection Fraction by Brown's Method with a biplane EF of 59 %.   3. Spectral Doppler shows impaired relaxation pattern of left   ventricular myocardial filling (Grade I diastolic dysfunction).   4. Mildly enlarged left atrium.   5. Normal right atrial size.   6. Mild mitral annular calcification.   7. Thickening of the anterior and posterior mitral valve leaflets.   8. Cannot exclude a vegetation on atrial aspect of PMVL.   9. Trace tricuspid regurgitation.  10. No evidence of aortic stenosis.    CT CERVICAL SPINE:  08/01/2018      There are destructive erosive changes across the C6-7 disc space with   mild loss of vertebralbody height. There may also be erosive changes   across the C7 T1 disc space posteriorly for example series 602 image 47.    There is thickening of the prevertebral soft tissues C5-T1 without gross   evidence of discrete drainable fluid collection. There is also thickening   of the ventral epidural soft tissues from C4 through T1 likely reflecting   epidural inflammation. This measures up to 4 mm in thickness.    Disc space gas is noted C5-6 and C7-T1.    At C2-3 there is no significant disc herniation, canal or foraminal   stenosis.    At C3-4 there is small disc osteophyte indenting the ventral thecal sac,   ligamentum flavum infolding indenting the dorsal thecal sac and uncinate   and facet hypertrophy with mild foraminal stenosis.    At C4-5 there is disc osteophyte indenting the ventral thecal sac,   ligamentum flavum infolding indenting the dorsal thecal sac and uncinate   and facet hypertrophy with mild foraminal stenosis.    At C5-6 there is disc osteophyte indenting the ventral thecal sac and   uncinate and facet hypertrophy with moderate to severe foraminal stenosis   worse on the left.    At C6-7 there is disc osteophyte indenting the ventral thecal sac and   uncinate spurring with mild foraminal narrowing.    At C7-T1 there is uncinate spurring with mild foraminal narrowing.    IMPRESSION:    1.  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    2.  There may be early involvement of the C7-T1 disc space, recommend   correlation with the patient's previous outside MRI.    3.  Multilevel degenerative changes as described.    JUANA: 08/02/2018       1. Incomplete study secondary to system malfunction with sudden machine   shut down but no evidence of valvular vegetations.   2. Normal left ventricular size and wall thicknesses, with normal   systolic function.   3. Left ventricular ejection fraction, by visual estimation, is 50 to   55%.    Outpt MRI images (not seen personally):  +  contrast enhancement and disc destruction of cervical spine with epidural tissue, resulting in thecal sac impingement.

## 2018-08-05 NOTE — PROGRESS NOTE ADULT - SUBJECTIVE AND OBJECTIVE BOX
Surgery: C5-T1 fusion with C6-7 corpectomy    No Known Allergies        T(C): 35.9 (08-05-18 @ 06:00), Max: 36.9 (08-04-18 @ 13:05)  HR: 65 (08-05-18 @ 06:00) (65 - 69)  BP: 168/77 (08-05-18 @ 06:00) (159/77 - 168/77)  RR: 18 (08-05-18 @ 06:00) (18 - 18)  SpO2: 98% (08-05-18 @ 08:19) (98% - 98%)  Wt(kg): --        08-05    142  |  100  |  11  ----------------------------<  228<H>  4.4   |  30  |  0.9    Ca    9.8      05 Aug 2018 09:07    TPro  6.5  /  Alb  4.3  /  TBili  0.6  /  DBili  x   /  AST  20  /  ALT  18  /  AlkPhos  85  08-05    CBC Full  -  ( 05 Aug 2018 09:07 )  WBC Count : 5.83 K/uL  Hemoglobin : 14.7 g/dL  Hematocrit : 40.6 %  Platelet Count - Automated : 88 K/uL  Mean Cell Volume : 93.3 fL  Mean Cell Hemoglobin : 33.8 pg  Mean Cell Hemoglobin Concentration : 36.2 g/dL          Pregnancy test: N/A  Type & Screen (in past 72hrs): 8/5/18      Medical Clearances:  Other Clearances: cleared by cardiology for intermediate risk    Last dose of antiplatelet/anticoagulation drug: discontinued lovenox for prophylaxis 8/5/18      Plan:  OR tomorrow  NPO after midnight  type and screen active  ASA and lovenox were discontinued   repeat cbc at 1600  d/w attending

## 2018-08-05 NOTE — PROGRESS NOTE ADULT - ASSESSMENT
68 yo male with PMHx of DM (diet controlled), HTN, CAD s/p CABG, was sent to the ED by Dr. Diez for evaluation of upper back pain.     # Upper back pain due to cervical osteomyelitis and epidural inflammation.    - TB Gold, HIV , blood culture and urine culture are negative.   - Hold antibiotics for now.   - OR for biopsy by neurosurg. tomorrow.   - Echo shows a possible vegetation on PMVL.   - JUANA negative for vegetations.   - Will need PICC     #Diabetes mellitus, HTN.    # Full code

## 2018-08-05 NOTE — PROGRESS NOTE ADULT - ASSESSMENT
66 yo male with PMHx of DM (diet controlled), HTN, CAD s/p CABG, was sent to the ED by neurosurgery (Dr. Diez) due to complaints of chronic neck pain (6 weeks) and MRI findings suspicious for cervical osteomyelitis.     1-? Cervical osteomyelitis  -ABX on hold -ID following  -OR by NSX on Monday  -Needs a PICC after the surgery for abx therapy   -Pain well controlled on current pain regimen       2-T2 DM: Cw Lantus sq, Humalog Sq as Scheduled- Check FS AC /HS    3-HTN:  -Losartan 50mg once a day-  -Atenolol-chlorthalidone 50/25mg on Hold    4-DVT, GI Prophylaxis  Pager No. [phone]

## 2018-08-05 NOTE — PROGRESS NOTE ADULT - SUBJECTIVE AND OBJECTIVE BOX
Chief Complaint:  Patient is a 67y old  Male who presents with a chief complaint of Chronic pain in the neck (6 weeks) (30 Jul 2018 11:42)      Interval Events:     Allergies:  No Known Allergies      Home Medications:    Hospital Medications:  atorvastatin 40 milliGRAM(s) Oral at bedtime  dextrose 40% Gel 15 Gram(s) Oral once PRN  dextrose 5%. 1000 milliLiter(s) IV Continuous <Continuous>  dextrose 50% Injectable 12.5 Gram(s) IV Push once  dextrose 50% Injectable 25 Gram(s) IV Push once  dextrose 50% Injectable 25 Gram(s) IV Push once  docusate sodium 100 milliGRAM(s) Oral daily  glucagon  Injectable 1 milliGRAM(s) IntraMuscular once PRN  insulin lispro (HumaLOG) corrective regimen sliding scale   SubCutaneous three times a day before meals  losartan 50 milliGRAM(s) Oral daily  oxyCODONE    IR 10 milliGRAM(s) Oral two times a day  senna 2 Tablet(s) Oral at bedtime      PMHX/PSHX:  Coronary artery disease  HTN (hypertension)  Diabetes mellitus  No pertinent past medical history  History of coronary artery bypass surgery      Family history:  Family history of heart disease (Mother)  No pertinent family history in first degree relatives      ROS:   As mentioned below      PHYSICAL EXAM:   Vital Signs:  Vital Signs Last 24 Hrs  T(C): 35.9 (05 Aug 2018 12:49), Max: 36.3 (04 Aug 2018 20:51)  T(F): 96.7 (05 Aug 2018 12:49), Max: 97.4 (04 Aug 2018 20:51)  HR: 63 (05 Aug 2018 12:49) (63 - 66)  BP: 148/63 (05 Aug 2018 12:49) (148/63 - 168/77)  BP(mean): --  RR: 18 (05 Aug 2018 12:49) (18 - 18)  SpO2: 98% (05 Aug 2018 08:19) (98% - 98%)  Daily     Daily     GENERAL:  NAD  HEENT:  NC/AT,  No Thyromegaly  CHEST:  CTA B/L  HEART:  S1, S2- No M, R, G  ABDOMEN:  Soft, NT, ND  EXTEREMITIES:  no cyanosis,clubbing or edema  SKIN:  NAD  NEURO:  Grossly Nr.    LABS:                        14.7   5.83  )-----------( 88       ( 05 Aug 2018 09:07 )             40.6     08-05    142  |  100  |  11  ----------------------------<  228<H>  4.4   |  30  |  0.9    Ca    9.8      05 Aug 2018 09:07    TPro  6.5  /  Alb  4.3  /  TBili  0.6  /  DBili  x   /  AST  20  /  ALT  18  /  AlkPhos  85  08-05    LIVER FUNCTIONS - ( 05 Aug 2018 09:07 )  Alb: 4.3 g/dL / Pro: 6.5 g/dL / ALK PHOS: 85 U/L / ALT: 18 U/L / AST: 20 U/L / GGT: x                   Imaging:

## 2018-08-06 LAB
ABO RH CONFIRMATION: SIGNIFICANT CHANGE UP
ALBUMIN SERPL ELPH-MCNC: 3.6 G/DL — SIGNIFICANT CHANGE UP (ref 3.5–5.2)
ALP SERPL-CCNC: 75 U/L — SIGNIFICANT CHANGE UP (ref 30–115)
ALT FLD-CCNC: 17 U/L — SIGNIFICANT CHANGE UP (ref 0–41)
ANION GAP SERPL CALC-SCNC: 16 MMOL/L — HIGH (ref 7–14)
AST SERPL-CCNC: 22 U/L — SIGNIFICANT CHANGE UP (ref 0–41)
BASOPHILS # BLD AUTO: 0.01 K/UL — SIGNIFICANT CHANGE UP (ref 0–0.2)
BASOPHILS NFR BLD AUTO: 0.2 % — SIGNIFICANT CHANGE UP (ref 0–1)
BILIRUB SERPL-MCNC: 0.5 MG/DL — SIGNIFICANT CHANGE UP (ref 0.2–1.2)
BUN SERPL-MCNC: 9 MG/DL — LOW (ref 10–20)
CALCIUM SERPL-MCNC: 8.5 MG/DL — SIGNIFICANT CHANGE UP (ref 8.5–10.1)
CHLORIDE SERPL-SCNC: 106 MMOL/L — SIGNIFICANT CHANGE UP (ref 98–110)
CO2 SERPL-SCNC: 23 MMOL/L — SIGNIFICANT CHANGE UP (ref 17–32)
CREAT SERPL-MCNC: 0.8 MG/DL — SIGNIFICANT CHANGE UP (ref 0.7–1.5)
CULTURE RESULTS: SIGNIFICANT CHANGE UP
EOSINOPHIL # BLD AUTO: 0.05 K/UL — SIGNIFICANT CHANGE UP (ref 0–0.7)
EOSINOPHIL NFR BLD AUTO: 0.9 % — SIGNIFICANT CHANGE UP (ref 0–8)
GLUCOSE SERPL-MCNC: 118 MG/DL — HIGH (ref 70–99)
HCT VFR BLD CALC: 36.5 % — LOW (ref 42–52)
HGB BLD-MCNC: 13.1 G/DL — LOW (ref 14–18)
IMM GRANULOCYTES NFR BLD AUTO: 0.4 % — HIGH (ref 0.1–0.3)
INR BLD: 1.2 RATIO — SIGNIFICANT CHANGE UP (ref 0.65–1.3)
LYMPHOCYTES # BLD AUTO: 1.01 K/UL — LOW (ref 1.2–3.4)
LYMPHOCYTES # BLD AUTO: 18.4 % — LOW (ref 20.5–51.1)
MCHC RBC-ENTMCNC: 33.2 PG — HIGH (ref 27–31)
MCHC RBC-ENTMCNC: 35.9 G/DL — SIGNIFICANT CHANGE UP (ref 32–37)
MCV RBC AUTO: 92.6 FL — SIGNIFICANT CHANGE UP (ref 80–94)
MONOCYTES # BLD AUTO: 0.44 K/UL — SIGNIFICANT CHANGE UP (ref 0.1–0.6)
MONOCYTES NFR BLD AUTO: 8 % — SIGNIFICANT CHANGE UP (ref 1.7–9.3)
NEUTROPHILS # BLD AUTO: 3.96 K/UL — SIGNIFICANT CHANGE UP (ref 1.4–6.5)
NEUTROPHILS NFR BLD AUTO: 72.1 % — SIGNIFICANT CHANGE UP (ref 42.2–75.2)
NRBC # BLD: 0 /100 WBCS — SIGNIFICANT CHANGE UP (ref 0–0)
PLATELET # BLD AUTO: 67 K/UL — LOW (ref 130–400)
POTASSIUM SERPL-MCNC: 3.8 MMOL/L — SIGNIFICANT CHANGE UP (ref 3.5–5)
POTASSIUM SERPL-SCNC: 3.8 MMOL/L — SIGNIFICANT CHANGE UP (ref 3.5–5)
PROT SERPL-MCNC: 5.7 G/DL — LOW (ref 6–8)
PROTHROM AB SERPL-ACNC: 12.9 SEC — HIGH (ref 9.95–12.87)
RBC # BLD: 3.94 M/UL — LOW (ref 4.7–6.1)
RBC # FLD: 13.8 % — SIGNIFICANT CHANGE UP (ref 11.5–14.5)
SODIUM SERPL-SCNC: 145 MMOL/L — SIGNIFICANT CHANGE UP (ref 135–146)
SPECIMEN SOURCE: SIGNIFICANT CHANGE UP
WBC # BLD: 5.49 K/UL — SIGNIFICANT CHANGE UP (ref 4.8–10.8)
WBC # FLD AUTO: 5.49 K/UL — SIGNIFICANT CHANGE UP (ref 4.8–10.8)

## 2018-08-06 RX ORDER — SENNA PLUS 8.6 MG/1
2 TABLET ORAL AT BEDTIME
Qty: 0 | Refills: 0 | Status: DISCONTINUED | OUTPATIENT
Start: 2018-08-06 | End: 2018-08-09

## 2018-08-06 RX ORDER — DEXTROSE 50 % IN WATER 50 %
25 SYRINGE (ML) INTRAVENOUS ONCE
Qty: 0 | Refills: 0 | Status: DISCONTINUED | OUTPATIENT
Start: 2018-08-06 | End: 2018-08-09

## 2018-08-06 RX ORDER — ONDANSETRON 8 MG/1
4 TABLET, FILM COATED ORAL EVERY 6 HOURS
Qty: 0 | Refills: 0 | Status: DISCONTINUED | OUTPATIENT
Start: 2018-08-06 | End: 2018-08-09

## 2018-08-06 RX ORDER — DEXTROSE 50 % IN WATER 50 %
15 SYRINGE (ML) INTRAVENOUS ONCE
Qty: 0 | Refills: 0 | Status: DISCONTINUED | OUTPATIENT
Start: 2018-08-06 | End: 2018-08-09

## 2018-08-06 RX ORDER — GLUCAGON INJECTION, SOLUTION 0.5 MG/.1ML
1 INJECTION, SOLUTION SUBCUTANEOUS ONCE
Qty: 0 | Refills: 0 | Status: DISCONTINUED | OUTPATIENT
Start: 2018-08-06 | End: 2018-08-09

## 2018-08-06 RX ORDER — INSULIN LISPRO 100/ML
VIAL (ML) SUBCUTANEOUS
Qty: 0 | Refills: 0 | Status: DISCONTINUED | OUTPATIENT
Start: 2018-08-06 | End: 2018-08-07

## 2018-08-06 RX ORDER — DEXAMETHASONE 0.5 MG/5ML
4 ELIXIR ORAL EVERY 6 HOURS
Qty: 0 | Refills: 0 | Status: COMPLETED | OUTPATIENT
Start: 2018-08-06 | End: 2018-08-07

## 2018-08-06 RX ORDER — PANTOPRAZOLE SODIUM 20 MG/1
40 TABLET, DELAYED RELEASE ORAL DAILY
Qty: 0 | Refills: 0 | Status: DISCONTINUED | OUTPATIENT
Start: 2018-08-06 | End: 2018-08-09

## 2018-08-06 RX ORDER — SODIUM CHLORIDE 9 MG/ML
1000 INJECTION, SOLUTION INTRAVENOUS
Qty: 0 | Refills: 0 | Status: DISCONTINUED | OUTPATIENT
Start: 2018-08-06 | End: 2018-08-09

## 2018-08-06 RX ORDER — HYDROMORPHONE HYDROCHLORIDE 2 MG/ML
1 INJECTION INTRAMUSCULAR; INTRAVENOUS; SUBCUTANEOUS
Qty: 0 | Refills: 0 | Status: DISCONTINUED | OUTPATIENT
Start: 2018-08-06 | End: 2018-08-06

## 2018-08-06 RX ORDER — HYDROMORPHONE HYDROCHLORIDE 2 MG/ML
0.5 INJECTION INTRAMUSCULAR; INTRAVENOUS; SUBCUTANEOUS
Qty: 0 | Refills: 0 | Status: DISCONTINUED | OUTPATIENT
Start: 2018-08-06 | End: 2018-08-06

## 2018-08-06 RX ORDER — ATORVASTATIN CALCIUM 80 MG/1
40 TABLET, FILM COATED ORAL AT BEDTIME
Qty: 0 | Refills: 0 | Status: DISCONTINUED | OUTPATIENT
Start: 2018-08-06 | End: 2018-08-09

## 2018-08-06 RX ORDER — DOCUSATE SODIUM 100 MG
100 CAPSULE ORAL THREE TIMES A DAY
Qty: 0 | Refills: 0 | Status: DISCONTINUED | OUTPATIENT
Start: 2018-08-06 | End: 2018-08-09

## 2018-08-06 RX ORDER — MORPHINE SULFATE 50 MG/1
2 CAPSULE, EXTENDED RELEASE ORAL ONCE
Qty: 0 | Refills: 0 | Status: DISCONTINUED | OUTPATIENT
Start: 2018-08-06 | End: 2018-08-06

## 2018-08-06 RX ORDER — DOCUSATE SODIUM 100 MG
100 CAPSULE ORAL DAILY
Qty: 0 | Refills: 0 | Status: DISCONTINUED | OUTPATIENT
Start: 2018-08-06 | End: 2018-08-08

## 2018-08-06 RX ORDER — SODIUM CHLORIDE 9 MG/ML
1000 INJECTION INTRAMUSCULAR; INTRAVENOUS; SUBCUTANEOUS
Qty: 0 | Refills: 0 | Status: DISCONTINUED | OUTPATIENT
Start: 2018-08-06 | End: 2018-08-07

## 2018-08-06 RX ORDER — OXYCODONE HYDROCHLORIDE 5 MG/1
10 TABLET ORAL
Qty: 0 | Refills: 0 | Status: DISCONTINUED | OUTPATIENT
Start: 2018-08-06 | End: 2018-08-09

## 2018-08-06 RX ORDER — DEXTROSE 50 % IN WATER 50 %
12.5 SYRINGE (ML) INTRAVENOUS ONCE
Qty: 0 | Refills: 0 | Status: DISCONTINUED | OUTPATIENT
Start: 2018-08-06 | End: 2018-08-09

## 2018-08-06 RX ORDER — SODIUM CHLORIDE 9 MG/ML
1000 INJECTION, SOLUTION INTRAVENOUS
Qty: 0 | Refills: 0 | Status: DISCONTINUED | OUTPATIENT
Start: 2018-08-06 | End: 2018-08-06

## 2018-08-06 RX ORDER — ONDANSETRON 8 MG/1
4 TABLET, FILM COATED ORAL ONCE
Qty: 0 | Refills: 0 | Status: DISCONTINUED | OUTPATIENT
Start: 2018-08-06 | End: 2018-08-06

## 2018-08-06 RX ORDER — ACETAMINOPHEN 500 MG
650 TABLET ORAL EVERY 6 HOURS
Qty: 0 | Refills: 0 | Status: DISCONTINUED | OUTPATIENT
Start: 2018-08-06 | End: 2018-08-09

## 2018-08-06 RX ORDER — LOSARTAN POTASSIUM 100 MG/1
50 TABLET, FILM COATED ORAL DAILY
Qty: 0 | Refills: 0 | Status: DISCONTINUED | OUTPATIENT
Start: 2018-08-06 | End: 2018-08-09

## 2018-08-06 RX ORDER — INSULIN LISPRO 100/ML
VIAL (ML) SUBCUTANEOUS AT BEDTIME
Qty: 0 | Refills: 0 | Status: DISCONTINUED | OUTPATIENT
Start: 2018-08-06 | End: 2018-08-06

## 2018-08-06 RX ADMIN — Medication 100 MILLIGRAM(S): at 23:38

## 2018-08-06 RX ADMIN — HYDROMORPHONE HYDROCHLORIDE 0.5 MILLIGRAM(S): 2 INJECTION INTRAMUSCULAR; INTRAVENOUS; SUBCUTANEOUS at 20:30

## 2018-08-06 RX ADMIN — HYDROMORPHONE HYDROCHLORIDE 0.5 MILLIGRAM(S): 2 INJECTION INTRAMUSCULAR; INTRAVENOUS; SUBCUTANEOUS at 21:26

## 2018-08-06 RX ADMIN — PANTOPRAZOLE SODIUM 40 MILLIGRAM(S): 20 TABLET, DELAYED RELEASE ORAL at 23:37

## 2018-08-06 RX ADMIN — MORPHINE SULFATE 2 MILLIGRAM(S): 50 CAPSULE, EXTENDED RELEASE ORAL at 01:46

## 2018-08-06 RX ADMIN — OXYCODONE HYDROCHLORIDE 10 MILLIGRAM(S): 5 TABLET ORAL at 23:56

## 2018-08-06 RX ADMIN — SODIUM CHLORIDE 100 MILLILITER(S): 9 INJECTION, SOLUTION INTRAVENOUS at 19:42

## 2018-08-06 RX ADMIN — SODIUM CHLORIDE 50 MILLILITER(S): 9 INJECTION INTRAMUSCULAR; INTRAVENOUS; SUBCUTANEOUS at 21:01

## 2018-08-06 RX ADMIN — MORPHINE SULFATE 2 MILLIGRAM(S): 50 CAPSULE, EXTENDED RELEASE ORAL at 01:25

## 2018-08-06 RX ADMIN — Medication 1 TABLET(S): at 23:37

## 2018-08-06 RX ADMIN — Medication 4 MILLIGRAM(S): at 23:52

## 2018-08-06 NOTE — CHART NOTE - NSCHARTNOTEFT_GEN_A_CORE
Registered Dietitian Follow-Up (limited)    Pt. out of room- in OR at multiple visits today. Will attempt to see pt. tomorrow for initial assessment.

## 2018-08-06 NOTE — PRE-ANESTHESIA EVALUATION ADULT - NSANTHOSAYNRD_GEN_A_CORE
No. TAMMI screening performed.  STOP BANG Legend: 0-2 = LOW Risk; 3-4 = INTERMEDIATE Risk; 5-8 = HIGH Risk
No. TAMMI screening performed.  STOP BANG Legend: 0-2 = LOW Risk; 3-4 = INTERMEDIATE Risk; 5-8 = HIGH Risk

## 2018-08-06 NOTE — BRIEF OPERATIVE NOTE - PRE-OP DX
Osteomyelitis of cervical spine  08/06/2018  C6-7 vertebral bodies. lytic process.  Active  Sunita Diez

## 2018-08-06 NOTE — CONSULT NOTE ADULT - ATTENDING COMMENTS
see above.
The patient was seen. Agree with above.  68 yo male has chronic thrombocytopenia for 30 years and was evaluated by hematologist. He stated that he had BM biopsy before. He was told that his thrombocytopenia was due to PCB( polychlorinated biphenyl). His PLT count has been ranging between 70 to low 100. He does not have easy bruising or any sign of bleeding. He received PLT transfusion and underwent procedure yesterday. There was no complication.    He is advised followup as out patient for chronic thrombocytopenia. He lives in [place] and would like to see hematologist locally.

## 2018-08-06 NOTE — BRIEF OPERATIVE NOTE - PROCEDURE
<<-----Click on this checkbox to enter Procedure Corpectomy, spine, cervical, 2 or more levels, anterior approach, with decompression  08/06/2018  and C5-T1 plated fusion  Active  [email or web address]

## 2018-08-06 NOTE — CHART NOTE - NSCHARTNOTEFT_GEN_A_CORE
PACU ANESTHESIA ADMISSION NOTE      Procedure:   Post op diagnosis:  Osteomyelitis of spine      ____  Intubated  TV:______       Rate: ______      FiO2: ______    _x___  Patent Airway    __x__  Full return of protective reflexes    __x__  Full recovery from anesthesia / back to baseline     Vitals:   T: 97.5          R:  18                BP:  142/87                Sat:     99              P: 92      Mental Status:  ___x_ Awake   __x___ Alert   _____ Drowsy   _____ Sedated    Nausea/Vomiting:  __x__ NO  ______Yes,   See Post - Op Orders          Pain Scale (0-10):  ___0__    Treatment: ____ None    ____ See Post - Op/PCA Orders    Post - Operative Fluids:   ____ Oral   ____ See Post - Op Orders    Plan: Discharge:   ____Home       ___x__Floor     _____Critical Care    _____  Other:_________________    Comments: No anesthesia complications noted.

## 2018-08-06 NOTE — PROGRESS NOTE ADULT - SUBJECTIVE AND OBJECTIVE BOX
24H events:    Patient is a 67y old Male who presents with a chief complaint of Chronic pain in the neck (6 weeks) (30 Jul 2018 11:42)    Primary diagnosis of Osteomyelitis     Today is hospital day 7d. No acute evens over night. OR with nsx today.     PAST MEDICAL & SURGICAL HISTORY  Coronary artery disease: s/p CABG  HTN (hypertension): On atenolol-chlorthalidone but doesnt take it  Diabetes mellitus: Diet-controlled; has taken metformin in the past as per patient  History of coronary artery bypass surgery    SOCIAL HISTORY:  Negative for smoking/alcohol/drug use.     ALLERGIES:  No Known Allergies    MEDICATIONS:  STANDING MEDICATIONS    PRN MEDICATIONS    VITALS:   T(F): 97.5  HR: 81  BP: 150/61  RR: 16  SpO2: --    LABS:                        12.8   5.61  )-----------( 79       ( 05 Aug 2018 18:11 )             35.7     08-05    142  |  100  |  11  ----------------------------<  228<H>  4.4   |  30  |  0.9    Ca    9.8      05 Aug 2018 09:07    TPro  6.5  /  Alb  4.3  /  TBili  0.6  /  DBili  x   /  AST  20  /  ALT  18  /  AlkPhos  85  08-05                  RADIOLOGY:    PHYSICAL EXAM:  GEN: No acute distress  LUNGS: Clear to auscultation bilaterally   HEART: S1/S2 present. RRR.   ABD: Soft, non-tender, non-distended. Bowel sounds present  EXT: No edema  NEURO: AAOX3, neck pain with palpation. nonfocal.    Assessment and Plan:    Assessment:  · Assessment		  68 yo male with PMHx of DM (diet controlled), HTN, CAD s/p CABG, was sent to the ED by neurosurgery (Dr. Diez) due to complaints of chronic neck pain (6 weeks) and MRI findings suspicious for cervical osteomyelitis.     #Possible Cervical osteomyelitis  -Id recs appreciated  OR by NSX today  Needs a PICC after the surgery for abx therapy   Pain well controlled on current pain regimen          -Chest X-ray no findings concerning for pulmonary TB lesion  - F/u QuantiFeron gold results   -Continue oxycodone 10mg 2 tabs bid for pain control    #Diabetes mellitus   -Check fingerstick glucose AC/HS  -Carb consistent diet      #HTN    -Start Losartan 50mg once a day as the patient is diabetic.  -Hold Atenolol-chlorthalidone 50/25mg as the patient was not taking it at home.  -DASH diet  - Well controlled     #Miscellaneous  -DVT ppx (Lovenox 40mg sq once daily)  -GI ppx (not indicated)  -Diet: Carb consistent DASH diet  -Activity: Ambulate as tolerated  -Full code

## 2018-08-06 NOTE — PROGRESS NOTE ADULT - ASSESSMENT
Stable, 67 y.o M POD#0 s/p C6-C7 corpectomy with  C5-T1 fusion 8/6/18.      Plan:  Analgesia prn pain  Cont. [place] collar at all the times.  F/U CBC, BMP tomorrow AM   Encourage intensive spirometry   D/C March catheter in AM, TOV  PT/Rehab evaluation in AM  Will d/w Dr. Diez when to resume ASA  DVT prophylaxis: B/L SCD's, ambulation  Cont. Decadron  NSGY will F/U

## 2018-08-06 NOTE — PROGRESS NOTE ADULT - SUBJECTIVE AND OBJECTIVE BOX
POC    Procedure: C6-C7 corpectomy with  C5-T1 fusion 8/6/18      67 y.o M POD#0 s/p C6-C7 corpectomy with  C5-T1 fusion 8/6/18. Pt. seen and examined at bedside PACU in [facility], voice no complaints, states pain well controlled, denies difficulty swallowing or SOB.        Vital Signs Last 24 Hrs  T(C): 36.8 (06 Aug 2018 20:41), Max: 36.8 (06 Aug 2018 20:41)  T(F): 98.2 (06 Aug 2018 20:41), Max: 98.2 (06 Aug 2018 20:41)  HR: 82 (06 Aug 2018 21:11) (76 - 100)  BP: 160/82 (06 Aug 2018 21:11) (150/61 - 171/75)  RR: 12 (06 Aug 2018 21:11) (9 - 17)  SpO2: 98% (06 Aug 2018 21:11) (98% - 100%)    PE:  General: Wn/WD in NAD  HEENT: NC/AT, PERRLA, EOMI  NECK: right anterior neck dressing C/D/I, [place] collar on.  Abd: Soft, NT/ND.  : + March catheter drains yellow urine.  Extremities: BURTON, motor/strength 5/5 B/L UE and B/L LE, sensations intact.       I&O's Detail    06 Aug 2018 07:01  -  06 Aug 2018 21:28  --------------------------------------------------------  IN:    lactated ringers.: 125 mL  Total IN: 125 mL    OUT:    Indwelling Catheter - Urethral: 150 mL  Total OUT: 150 mL    Total NET: -25 mL        LABS:                        12.8   5.61  )-----------( 79       ( 05 Aug 2018 18:11 )             35.7     08-05    142  |  100  |  11  ----------------------------<  228<H>  4.4   |  30  |  0.9    Ca    9.8      05 Aug 2018 09:07    TPro  6.5  /  Alb  4.3  /  TBili  0.6  /  DBili  x   /  AST  20  /  ALT  18  /  AlkPhos  85  08-05

## 2018-08-06 NOTE — CONSULT NOTE ADULT - ASSESSMENT
66 yo male with PMHx of DM (diet controlled), HTN, CAD s/p CABG, presents to the ED with worsening pain in the neck and upper back was being treated for OM . heme was consulted for low platelets    1) Low platelets, no baseline  as per patients his platelets have been low for past 30 years   will check smear  maintain platelets above 100 for neurosurgical procedure  transfuse 2 units 68 yo male with PMHx of DM (diet controlled), HTN, CAD s/p CABG, presents to the ED with worsening pain in the neck and upper back was being treated for OM . heme was consulted for low platelets    1) chronic thrombocytopenia  as per patients his platelets have been low for past 30 years   will check smear  maintain platelets above 100 for neurosurgical procedure  transfuse 2 units 66 yo male with PMHx of DM (diet controlled), HTN, CAD s/p CABG, presents to the ED with worsening pain in the neck and upper back was being treated for OM . heme was consulted for low platelets    chronic thrombocytopenia  as per patients his platelets have been low for past 30 years   will check smear  maintain platelets above 100 for neurosurgical procedure  transfuse 2 units

## 2018-08-06 NOTE — CONSULT NOTE ADULT - SUBJECTIVE AND OBJECTIVE BOX
Patient is a 67y old  Male who presents with a chief complaint of Chronic pain in the neck (6 weeks) (30 Jul 2018 11:42)      HPI:  68 yo male with PMHx of DM (diet controlled), HTN, CAD s/p CABG, presents to the ED with worsening pain in the neck and upper back. Pain is present since the past 6 weeks, in the neck and upper back and the shoulders b/l, now radiating to the left shoulder since the last 5-6 days. He describes the pain as steady and dull usually but becomes sharp during movement, rates '20/10' in intensity, aggravated by the movement, partially relieved for about 5-6 hours with Oxycodone (10mg 2 tabs twice a day). No visible skin changes in the back, no history of trauma, no stiffness. No previous episodes of similar pain. Patient denies any history of fever, chills, rigor, dizziness or LOC. No history of loss of appetite, fatigue. Patient reports history of night sweats but did not see that as anything abnormal (before the pain started). Patient reports tingling sensation in right hand fingers at times but no weakness or numbness anywhere. No vision and hearing changes. Patient has history of travel last february to [facility], [facility] and [facility]. No history of any sick contacts. Patient has had 3 MRIs of the cervical spine (last 07/24/2018) because of chronic neck pain and was referred by Dr. Sunita Diez (neurosurgery) for inpatient treatment to rule out osteomyelitis based on MRI findings suspicious for osteomyelitis.Patient has a CD disc with his MRI results (put in his ED chart)     ROS:  Negative except for:    PAST MEDICAL & SURGICAL HISTORY:  Coronary artery disease: s/p CABG  HTN (hypertension): On atenolol-chlorthalidone but doesnt take it  Diabetes mellitus: Diet-controlled; has taken metformin in the past as per patient  History of coronary artery bypass surgery      SOCIAL HISTORY:    FAMILY HISTORY:  Family history of heart disease (Mother)      MEDICATIONS  (STANDING):  atorvastatin 40 milliGRAM(s) Oral at bedtime  dextrose 5%. 1000 milliLiter(s) (50 mL/Hr) IV Continuous <Continuous>  dextrose 50% Injectable 12.5 Gram(s) IV Push once  dextrose 50% Injectable 25 Gram(s) IV Push once  dextrose 50% Injectable 25 Gram(s) IV Push once  docusate sodium 100 milliGRAM(s) Oral daily  insulin lispro (HumaLOG) corrective regimen sliding scale   SubCutaneous three times a day before meals  losartan 50 milliGRAM(s) Oral daily  oxyCODONE    IR 10 milliGRAM(s) Oral two times a day  senna 2 Tablet(s) Oral at bedtime    MEDICATIONS  (PRN):  dextrose 40% Gel 15 Gram(s) Oral once PRN Blood Glucose LESS THAN 70 milliGRAM(s)/deciliter  glucagon  Injectable 1 milliGRAM(s) IntraMuscular once PRN Glucose LESS THAN 70 milligrams/deciliter      Allergies    No Known Allergies    Intolerances        Vital Signs Last 24 Hrs  T(C): 36.4 (06 Aug 2018 10:07), Max: 36.4 (06 Aug 2018 05:44)  T(F): 97.5 (06 Aug 2018 10:07), Max: 97.5 (06 Aug 2018 05:44)  HR: 81 (06 Aug 2018 10:07) (63 - 81)  BP: 150/61 (06 Aug 2018 10:07) (148/63 - 179/83)  BP(mean): --  RR: 16 (06 Aug 2018 10:07) (16 - 18)  SpO2: --    PHYSICAL EXAM  General: adult in NAD  HEENT: clear oropharynx, anicteric sclera, pink conjunctivae  Neck: supple  CV: normal S1S2 with no murmur rubs or gallops  Lungs: clear to auscultation, no wheezes, no rhales  Abdomen: soft non-tender non-distended, no hepato/splenomegaly  Ext: no clubbing cyanosis or edema  Skin: no rashes and no petichiae  Neuro: alert and oriented X3 no focal deficits      LABS:    CBC Full  -  ( 05 Aug 2018 18:11 )  WBC Count : 5.61 K/uL  Hemoglobin : 12.8 g/dL  Hematocrit : 35.7 %  Platelet Count - Automated : 79 K/uL  Mean Cell Volume : 93.5 fL  Mean Cell Hemoglobin : 33.5 pg  Mean Cell Hemoglobin Concentration : 35.9 g/dL  Auto Neutrophil # : x  Auto Lymphocyte # : x  Auto Monocyte # : x  Auto Eosinophil # : x  Auto Basophil # : x  Auto Neutrophil % : x  Auto Lymphocyte % : x  Auto Monocyte % : x  Auto Eosinophil % : x  Auto Basophil % : x      [id number]  08-05    142  |  100  |  11  ----------------------------<  228<H>  4.4   |  30  |  0.9    Ca    9.8      05 Aug 2018 09:07    TPro  6.5  /  Alb  4.3  /  TBili  0.6  /  DBili  x   /  AST  20  /  ALT  18  /  AlkPhos  85  08-05        BLOOD SMEAR INTERPRETATION:    RADIOLOGY :  < from: CT Cervical Spine No Cont (08.01.18 @ 09:20) >    1.  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    2.  There may be early involvement of the C7-T1 disc space, recommend   correlation with the patient's previous outside MRI.    3.  Multilevel degenerative changes as described.    < end of copied text > Patient is a 67y old  Male who presents with a chief complaint of Chronic pain in the neck (6 weeks) (30 Jul 2018 11:42)      HPI:  66 yo male with PMHx of DM (diet controlled), HTN, CAD s/p CABG, presents to the ED with worsening pain in the neck and upper back. Pain is present since the past 6 weeks, in the neck and upper back and the shoulders b/l, now radiating to the left shoulder since the last 5-6 days. He describes the pain as steady and dull usually but becomes sharp during movement, rates '20/10' in intensity, aggravated by the movement, partially relieved for about 5-6 hours with Oxycodone (10mg 2 tabs twice a day). No visible skin changes in the back, no history of trauma, no stiffness. No previous episodes of similar pain. Patient denies any history of fever, chills, rigor, dizziness or LOC. No history of loss of appetite, fatigue. Patient reports history of night sweats but did not see that as anything abnormal (before the pain started). Patient reports tingling sensation in right hand fingers at times but no weakness or numbness anywhere. No vision and hearing changes. Patient has history of travel last february to [facility], [facility], [facility] and [facility]. No history of any sick contacts. Patient has had 3 MRIs of the cervical spine (last 07/24/2018) because of chronic neck pain and was referred by Dr. Sunita Diez (neurosurgery) for inpatient treatment to rule out osteomyelitis based on MRI findings suspicious for osteomyelitis.Patient has a CD disc with his MRI results During hospital patient had ct scan which shows signs of OM and is scheduled for biopsy.     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 he had work up done in [place], even had BM biopsy and Fat biopsy done. as per his doctor, they couldn't find any etiology and his low platelets could be 2/2 exposure to PCB( polychlorinated biphenyl). he denies any bleeding or hx of clots or any bruisingor lumps or bumps    ROS:  Negative except for:    PAST MEDICAL & SURGICAL HISTORY:  Coronary artery disease: s/p CABG  HTN (hypertension): On atenolol-chlorthalidone but doesnt take it  Diabetes mellitus: Diet-controlled; has taken metformin in the past as per patient  History of coronary artery bypass surgery      SOCIAL HISTORY:    FAMILY HISTORY:  Family history of heart disease (Mother)      MEDICATIONS  (STANDING):  atorvastatin 40 milliGRAM(s) Oral at bedtime  dextrose 5%. 1000 milliLiter(s) (50 mL/Hr) IV Continuous <Continuous>  dextrose 50% Injectable 12.5 Gram(s) IV Push once  dextrose 50% Injectable 25 Gram(s) IV Push once  dextrose 50% Injectable 25 Gram(s) IV Push once  docusate sodium 100 milliGRAM(s) Oral daily  insulin lispro (HumaLOG) corrective regimen sliding scale   SubCutaneous three times a day before meals  losartan 50 milliGRAM(s) Oral daily  oxyCODONE    IR 10 milliGRAM(s) Oral two times a day  senna 2 Tablet(s) Oral at bedtime    MEDICATIONS  (PRN):  dextrose 40% Gel 15 Gram(s) Oral once PRN Blood Glucose LESS THAN 70 milliGRAM(s)/deciliter  glucagon  Injectable 1 milliGRAM(s) IntraMuscular once PRN Glucose LESS THAN 70 milligrams/deciliter      Allergies    No Known Allergies    Intolerances        Vital Signs Last 24 Hrs  T(C): 36.4 (06 Aug 2018 10:07), Max: 36.4 (06 Aug 2018 05:44)  T(F): 97.5 (06 Aug 2018 10:07), Max: 97.5 (06 Aug 2018 05:44)  HR: 81 (06 Aug 2018 10:07) (63 - 81)  BP: 150/61 (06 Aug 2018 10:07) (148/63 - 179/83)  BP(mean): --  RR: 16 (06 Aug 2018 10:07) (16 - 18)  SpO2: --    PHYSICAL EXAM  General: adult in NAD  HEENT: clear oropharynx, anicteric sclera, pink conjunctivae  Neck: supple  CV: normal S1S2 with no murmur rubs or gallops  Lungs: clear to auscultation, no wheezes, no rhales  Abdomen: soft non-tender non-distended, no hepato/splenomegaly  Ext: no clubbing cyanosis or edema  Skin: no rashes and no petichiae  Neuro: alert and oriented X3 no focal deficits      LABS:    CBC Full  -  ( 05 Aug 2018 18:11 )  WBC Count : 5.61 K/uL  Hemoglobin : 12.8 g/dL  Hematocrit : 35.7 %  Platelet Count - Automated : 79 K/uL  Mean Cell Volume : 93.5 fL  Mean Cell Hemoglobin : 33.5 pg  Mean Cell Hemoglobin Concentration : 35.9 g/dL  Auto Neutrophil # : x  Auto Lymphocyte # : x  Auto Monocyte # : x  Auto Eosinophil # : x  Auto Basophil # : x  Auto Neutrophil % : x  Auto Lymphocyte % : x  Auto Monocyte % : x  Auto Eosinophil % : x  Auto Basophil % : x      [id number]  08-05    142  |  100  |  11  ----------------------------<  228<H>  4.4   |  30  |  0.9    Ca    9.8      05 Aug 2018 09:07    TPro  6.5  /  Alb  4.3  /  TBili  0.6  /  DBili  x   /  AST  20  /  ALT  18  /  AlkPhos  85  08-05        BLOOD SMEAR INTERPRETATION:    RADIOLOGY :  < from: CT Cervical Spine No Cont (08.01.18 @ 09:20) >    1.  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    2.  There may be early involvement of the C7-T1 disc space, recommend   correlation with the patient's previous outside MRI.    3.  Multilevel degenerative changes as described.    < end of copied text >

## 2018-08-07 LAB
APTT BLD: 29.9 SEC — SIGNIFICANT CHANGE UP (ref 27–39.2)
NIGHT BLUE STAIN TISS: SIGNIFICANT CHANGE UP
SPECIMEN SOURCE: SIGNIFICANT CHANGE UP

## 2018-08-07 RX ORDER — CEFEPIME 1 G/1
INJECTION, POWDER, FOR SOLUTION INTRAMUSCULAR; INTRAVENOUS
Qty: 0 | Refills: 0 | Status: DISCONTINUED | OUTPATIENT
Start: 2018-08-07 | End: 2018-08-09

## 2018-08-07 RX ORDER — CEFEPIME 1 G/1
2000 INJECTION, POWDER, FOR SOLUTION INTRAMUSCULAR; INTRAVENOUS ONCE
Qty: 0 | Refills: 0 | Status: COMPLETED | OUTPATIENT
Start: 2018-08-07 | End: 2018-08-07

## 2018-08-07 RX ORDER — INSULIN LISPRO 100/ML
VIAL (ML) SUBCUTANEOUS
Qty: 0 | Refills: 0 | Status: DISCONTINUED | OUTPATIENT
Start: 2018-08-07 | End: 2018-08-09

## 2018-08-07 RX ORDER — VANCOMYCIN HCL 1 G
1500 VIAL (EA) INTRAVENOUS EVERY 12 HOURS
Qty: 0 | Refills: 0 | Status: DISCONTINUED | OUTPATIENT
Start: 2018-08-07 | End: 2018-08-09

## 2018-08-07 RX ORDER — CEFEPIME 1 G/1
2000 INJECTION, POWDER, FOR SOLUTION INTRAMUSCULAR; INTRAVENOUS EVERY 8 HOURS
Qty: 0 | Refills: 0 | Status: DISCONTINUED | OUTPATIENT
Start: 2018-08-07 | End: 2018-08-09

## 2018-08-07 RX ORDER — INSULIN HUMAN 100 [IU]/ML
12 INJECTION, SOLUTION SUBCUTANEOUS ONCE
Qty: 0 | Refills: 0 | Status: COMPLETED | OUTPATIENT
Start: 2018-08-07 | End: 2018-08-07

## 2018-08-07 RX ADMIN — Medication 100 MILLIGRAM(S): at 14:11

## 2018-08-07 RX ADMIN — CEFEPIME 100 MILLIGRAM(S): 1 INJECTION, POWDER, FOR SOLUTION INTRAMUSCULAR; INTRAVENOUS at 18:23

## 2018-08-07 RX ADMIN — Medication 4 MILLIGRAM(S): at 06:28

## 2018-08-07 RX ADMIN — Medication 4: at 17:19

## 2018-08-07 RX ADMIN — Medication 100 MILLIGRAM(S): at 06:27

## 2018-08-07 RX ADMIN — Medication 1 TABLET(S): at 11:42

## 2018-08-07 RX ADMIN — Medication 4: at 11:44

## 2018-08-07 RX ADMIN — Medication 300 MILLIGRAM(S): at 18:23

## 2018-08-07 RX ADMIN — Medication: at 06:54

## 2018-08-07 RX ADMIN — OXYCODONE HYDROCHLORIDE 10 MILLIGRAM(S): 5 TABLET ORAL at 06:30

## 2018-08-07 RX ADMIN — LOSARTAN POTASSIUM 50 MILLIGRAM(S): 100 TABLET, FILM COATED ORAL at 06:27

## 2018-08-07 RX ADMIN — Medication 4 MILLIGRAM(S): at 11:42

## 2018-08-07 RX ADMIN — SENNA PLUS 2 TABLET(S): 8.6 TABLET ORAL at 21:29

## 2018-08-07 RX ADMIN — Medication 100 MILLIGRAM(S): at 11:42

## 2018-08-07 RX ADMIN — PANTOPRAZOLE SODIUM 40 MILLIGRAM(S): 20 TABLET, DELAYED RELEASE ORAL at 11:43

## 2018-08-07 RX ADMIN — CEFEPIME 100 MILLIGRAM(S): 1 INJECTION, POWDER, FOR SOLUTION INTRAMUSCULAR; INTRAVENOUS at 21:30

## 2018-08-07 RX ADMIN — INSULIN HUMAN 12 UNIT(S): 100 INJECTION, SOLUTION SUBCUTANEOUS at 23:36

## 2018-08-07 RX ADMIN — Medication 4 MILLIGRAM(S): at 17:19

## 2018-08-07 RX ADMIN — ATORVASTATIN CALCIUM 40 MILLIGRAM(S): 80 TABLET, FILM COATED ORAL at 21:29

## 2018-08-07 RX ADMIN — OXYCODONE HYDROCHLORIDE 10 MILLIGRAM(S): 5 TABLET ORAL at 17:22

## 2018-08-07 RX ADMIN — Medication 100 MILLIGRAM(S): at 21:29

## 2018-08-07 NOTE — PROGRESS NOTE ADULT - SUBJECTIVE AND OBJECTIVE BOX
Subjective: no pain    T(C): 36.7 (08-07-18 @ 07:40), Max: 36.9 (08-06-18 @ 21:30)  HR: 65 (08-07-18 @ 07:40) (65 - 100)  BP: 150/67 (08-07-18 @ 07:40) (127/60 - 173/97)  RR: 18 (08-07-18 @ 07:40) (9 - 18)  SpO2: 98% (08-06-18 @ 22:00) (94% - 100%)  Wt(kg): --    Exam: awake, alert, NAD, follows commands, no pain, BURTON with good strength sensation intact, hard collar in place    CBC Full  -  ( 06 Aug 2018 20:30 )  WBC Count : 5.49 K/uL  Hemoglobin : 13.1 g/dL  Hematocrit : 36.5 %  Platelet Count - Automated : 67 K/uL  Mean Cell Volume : 92.6 fL  Mean Cell Hemoglobin : 33.2 pg  Mean Cell Hemoglobin Concentration : 35.9 g/dL  Auto Neutrophil # : 3.96 K/uL  Auto Lymphocyte # : 1.01 K/uL  Auto Monocyte # : 0.44 K/uL  Auto Eosinophil # : 0.05 K/uL  Auto Basophil # : 0.01 K/uL  Auto Neutrophil % : 72.1 %  Auto Lymphocyte % : 18.4 %  Auto Monocyte % : 8.0 %  Auto Eosinophil % : 0.9 %  Auto Basophil % : 0.2 %    08-06    145  |  106  |  9<L>  ----------------------------<  118<H>  3.8   |  23  |  0.8    Ca    8.5      06 Aug 2018 20:30    TPro  5.7<L>  /  Alb  3.6  /  TBili  0.5  /  DBili  x   /  AST  22  /  ALT  17  /  AlkPhos  75  08-06    PT/INR - ( 06 Aug 2018 20:30 )   PT: 12.90 sec;   INR: 1.20 ratio         PTT - ( 07 Aug 2018 07:28 )  PTT:29.9 sec      Wound: dressing dry, no swelling      Assessment - stable POD 1     Plan: await, cult, path, continue with collar, PT

## 2018-08-07 NOTE — CONSULT NOTE ADULT - SUBJECTIVE AND OBJECTIVE BOX
Patient is a 67y old  Male who presents with a chief complaint of Surgery (31 Jul 2018 00:26)    HPI:  68 yo male with PMHx of DM (diet controlled), HTN, CAD s/p CABG, presents to the ED with worsening pain in the neck and upper back. Pain is present since the past 6 weeks, in the neck and upper back and the shoulders b/l, now radiating to the left shoulder since the last 5-6 days. He describes the pain as steady and dull usually but becomes sharp during movement, rates '20/10' in intensity, aggravated by the movement, partially relieved for about 5-6 hours with Oxycodone (10mg 2 tabs twice a day). No visible skin changes in the back, no history of trauma, no stiffness. No previous episodes of similar pain. Patient denies any history of fever, chills, rigor, dizziness or LOC. No history of loss of appetite, fatigue. Patient reports history of night sweats but did not see that as anything abnormal (before the pain started). Patient reports tingling sensation in right hand fingers at times but no weakness or numbness anywhere. No vision and hearing changes. Patient has history of travel last february to [facility], [facility], [place] and [facility]. No history of any sick contacts. Patient has had 3 MRIs of the cervical spine (last 07/24/2018) because of chronic neck pain and was referred by Dr. Sunita Diez (neurosurgery) for inpatient treatment to rule out osteomyelitis based on MRI findings suspicious for osteomyelitis.  Patient has a CD disc with his MRI results (put in his ED chart) (30 Jul 2018 11:42)      PAST MEDICAL & SURGICAL HISTORY:  Coronary artery disease: s/p CABG  HTN (hypertension): On atenolol-chlorthalidone but doesnt take it  Diabetes mellitus: Diet-controlled; has taken metformin in the past as per patient  History of coronary artery bypass surgery      Hospital Course: seen by NS/ ID MRI Cspine suggestive of osteomyelitis- TB ruled out  SP c6-7 corpectomy with c5-t1 fusion 8/6- Post op course uncomplicated    TODAY'S SUBJECTIVE & REVIEW OF SYMPTOMS:     Constitutional WNL   Cardio WNL   Resp WNL   GI WNL  Heme WNL  Endo WNL  Skin WNL  MSK WNL  Neuro WNL  Cognitive WNL  Psych WNL      MEDICATIONS  (STANDING):  atorvastatin 40 milliGRAM(s) Oral at bedtime  dexamethasone  Injectable 4 milliGRAM(s) IV Push every 6 hours  dextrose 5%. 1000 milliLiter(s) (50 mL/Hr) IV Continuous <Continuous>  dextrose 50% Injectable 12.5 Gram(s) IV Push once  dextrose 50% Injectable 25 Gram(s) IV Push once  dextrose 50% Injectable 25 Gram(s) IV Push once  docusate sodium 100 milliGRAM(s) Oral daily  docusate sodium 100 milliGRAM(s) Oral three times a day  insulin lispro (HumaLOG) corrective regimen sliding scale   SubCutaneous four times a day before meals  losartan 50 milliGRAM(s) Oral daily  multivitamin 1 Tablet(s) Oral daily  oxyCODONE    IR 10 milliGRAM(s) Oral two times a day  pantoprazole    Tablet 40 milliGRAM(s) Oral daily  senna 2 Tablet(s) Oral at bedtime  sodium chloride 0.9%. 1000 milliLiter(s) (50 mL/Hr) IV Continuous <Continuous>    MEDICATIONS  (PRN):  acetaminophen   Tablet 650 milliGRAM(s) Oral every 6 hours PRN For Temp greater than 38.5 C (101.3 F)  dextrose 40% Gel 15 Gram(s) Oral once PRN Blood Glucose LESS THAN 70 milliGRAM(s)/deciliter  glucagon  Injectable 1 milliGRAM(s) IntraMuscular once PRN Glucose LESS THAN 70 milligrams/deciliter  ondansetron Injectable 4 milliGRAM(s) IV Push every 6 hours PRN Nausea and/or Vomiting  senna 2 Tablet(s) Oral at bedtime PRN Constipation      FAMILY HISTORY:  Family history of heart disease (Mother)      Allergies    No Known Allergies    Intolerances        SOCIAL HISTORY:    [    ] Etoh  [ x   ] Smoking  [    ] Substance abuse     Home Environment:  [    ] Home Alone  [  x  ] Lives with Family  [    ] Home Health Aid    Dwelling:  [    ] Apartment  [ x   ] Private House  [    ] Adult Home  [    ] Skilled Nursing Facility      [    ] Short Term  [    ] Long Term  [  x ] Stairs   outside                        [    ] Elevator     FUNCTIONAL STATUS PTA: (Check all that apply)  Ambulation: [  x   ]Independent    [    ] Dependent     [    ] Non-Ambulatory  Assistive Device: [    ] SA Cane  [    ]  Q Cane  [    ] Walker  [    ]  Wheelchair  ADL : [   x ] Independent  [    ]  Dependent       Vital Signs Last 24 Hrs  T(C): 36.7 (07 Aug 2018 07:40), Max: 36.9 (06 Aug 2018 21:30)  T(F): 98 (07 Aug 2018 07:40), Max: 98.5 (06 Aug 2018 21:30)  HR: 65 (07 Aug 2018 07:40) (65 - 100)  BP: 150/67 (07 Aug 2018 07:40) (127/60 - 173/97)  BP(mean): --  RR: 18 (07 Aug 2018 07:40) (9 - 18)  SpO2: 98% (06 Aug 2018 22:00) (94% - 100%)      PHYSICAL EXAM: Alert & Oriented X3 +O2  GENERAL: NAD, well-groomed, well-developed  HEAD:  Atraumatic, Normocephalic  EYES: EOMI, PERRLA, conjunctiva and sclera clear  NECK: Supple, No JVD, Normal thyroid  in HARd Colar  CHEST/LUNG: Clear  bilaterally; No rales, rhonchi, wheezing, or rubs  HEART: Regular rate and rhythm; No murmurs, rubs, or gallops  ABDOMEN: Soft, Nontender, Nondistended; Bowel sounds present +webb  EXTREMITIES:  2+ Peripheral Pulses, No clubbing, cyanosis, or edema    NERVOUS SYSTEM:  Cranial Nerves 2-12 intact [  x  ] Abnormal  [    ]  ROM: WFL all extremities [  x  ]  Abnormal [     ]  Motor Strength: WFL all extremities  [  x  ]  Abnormal [    ]  Sensation: intact to light touch [  x  ] Abnormal [    ]  Reflexes: Symmetric [ x   ]  Abnormal [    ]    FUNCTIONAL STATUS:  Bed Mobility: [   ]  Independent [    ]  Supervision [ x   ]  Needs Assistance [  ]  N/A  Transfers: [    ]  Independent [    ]  Supervision [    ]  Needs Assistance [  x  ]  N/A    Ambulation:  [    ]  Independent [    ]  Supervision [    ]  Needs Assistance [ x   ]  N/A   ADL:  [    ]   Independent [  x  ] Requires Assistance [    ] N/A       LABS:                        13.1   5.49  )-----------( 67       ( 06 Aug 2018 20:30 )             36.5     08-06    145  |  106  |  9<L>  ----------------------------<  118<H>  3.8   |  23  |  0.8    Ca    8.5      06 Aug 2018 20:30    TPro  5.7<L>  /  Alb  3.6  /  TBili  0.5  /  DBili  x   /  AST  22  /  ALT  17  /  AlkPhos  75  08-06    PT/INR - ( 06 Aug 2018 20:30 )   PT: 12.90 sec;   INR: 1.20 ratio         PTT - ( 07 Aug 2018 07:28 )  PTT:29.9 sec      RADIOLOGY & ADDITIONAL STUDIES:

## 2018-08-07 NOTE — PHYSICAL THERAPY INITIAL EVALUATION ADULT - GENERAL OBSERVATIONS, REHAB EVAL
13:55-14:30 Pt encountered sitting in b/s chair with IV lock, webb, c/s collar, chair alarm, in NAD.  Pt reports feeling much better since the surgery. States he has no pain. BP: 178/82, HR: 91bpm. RN Jill aware. Pt left same as found with IV lock, webb, c/s collar, chair alarm, in NAD.

## 2018-08-07 NOTE — CONSULT NOTE ADULT - ASSESSMENT
IMPRESSION: Rehab of gait ab cervical osteo sp fusion    PRECAUTIONS: [   x ] Cardiac  [    ] Respiratory  [    ] Seizures [    ] Contact Isolation  [    ] Droplet Isolation  [    ] Other    Weight Bearing Status:     RECOMMENDATION:    Out of Bed to Chair     DVT/Decubiti Prophylaxis    REHAB PLAN:     [ x    ] Bedside P/T 3-5 times a week   [     ] Bedside O/T  2-3 times a week   [     ] No Rehab Therapy Indicated   [     ]  Speech Therapy   Conditioning/ROM                                 ADL  Bed Mobility                                            Conditioning/ROM  Transfers                                                  Bed Mobility  Sitting /Standing Balance                      Transfers                                        Gait Training                                            Sitting/Standing Balance  Stair Training [ x  ]Applicable                 Home equipment Eval                                                                     Splinting  [   ] Only      GOALS:   ADL   [   x ]   Independent         Transfers  [  x  ] Independent            Ambulation  [ x    ] Independent     [    x ] With device?                            [    ]  CG                                               [    ]  CG                                                    [     ] CG                            [    ] Min A                                          [    ] Min A                                                [     ] Min  A          DISCHARGE PLAN:   [     ]  Good candidate for Intensive Rehabilitation/Hospital based-[facility]A [facility]                                             Will tolerate 3hrs Intensive Rehab Daily                                       [  x    ]  Short Term Rehab in Skilled Nursing Facility FOR ANTIBIOTICS?                             vs                                     [     x ]  Home with Outpatient or VN services                                         [      ]  Possible Candidate for Intensive Hospital based Rehab

## 2018-08-07 NOTE — PROGRESS NOTE ADULT - SUBJECTIVE AND OBJECTIVE BOX
INTERVAL HPI/OVERNIGHT EVENTS:  S/P JUANA  8/6 - Lysis of C6-7 disc space and bone as well as epidural soft tissue.     No Known Allergies  Alert, oriented, s1s2, no wheezing, comfortable, abdomen soft, working on computer.  Uses dentures.   s/p biopsy of cervical spine.     acetaminophen   Tablet 650 milliGRAM(s) Oral every 6 hours PRN  atorvastatin 40 milliGRAM(s) Oral at bedtime  dexamethasone  Injectable 4 milliGRAM(s) IV Push every 6 hours  dextrose 40% Gel 15 Gram(s) Oral once PRN  dextrose 5%. 1000 milliLiter(s) IV Continuous <Continuous>  dextrose 50% Injectable 12.5 Gram(s) IV Push once  dextrose 50% Injectable 25 Gram(s) IV Push once  dextrose 50% Injectable 25 Gram(s) IV Push once  docusate sodium 100 milliGRAM(s) Oral daily  docusate sodium 100 milliGRAM(s) Oral three times a day  glucagon  Injectable 1 milliGRAM(s) IntraMuscular once PRN  insulin lispro (HumaLOG) corrective regimen sliding scale   SubCutaneous four times a day before meals  losartan 50 milliGRAM(s) Oral daily  multivitamin 1 Tablet(s) Oral daily  ondansetron Injectable 4 milliGRAM(s) IV Push every 6 hours PRN  oxyCODONE    IR 10 milliGRAM(s) Oral two times a day  pantoprazole    Tablet 40 milliGRAM(s) Oral daily  senna 2 Tablet(s) Oral at bedtime  senna 2 Tablet(s) Oral at bedtime PRN  sodium chloride 0.9%. 1000 milliLiter(s) IV Continuous <Continuous>      Vital Signs Last 24 Hrs  T(C): 36.6 (07 Aug 2018 16:01), Max: 36.9 (06 Aug 2018 21:30)  T(F): 97.8 (07 Aug 2018 16:01), Max: 98.5 (06 Aug 2018 21:30)  HR: 84 (07 Aug 2018 16:01) (65 - 100)  BP: 168/78 (07 Aug 2018 16:01) (127/60 - 173/97)  BP(mean): --  RR: 18 (07 Aug 2018 16:01) (9 - 18)  SpO2: 98% (06 Aug 2018 22:00) (94% - 100%)    LABS:                        13.1   5.49  )-----------( 67       ( 06 Aug 2018 20:30 )             36.5       HIV-1/2 Antigen/Antibody Screen by [facility] (07.31.18 @ 17:59)    HIV-1/2 Combo Result: Nonreact:     Quantiferon - Gold Tuberculosis (07.30.18 @ 16:20)    Quantiferon - Gold Tuberculosis Result: Negative    Hemoglobin A1C with Mean Plasma Glucose (07.31.18 @ 07:59)    Hemoglobin A1C, Whole Blood: 8.5 %    Sedimentation Rate, Erythrocyte (07.30.18 @ 16:20)    Sedimentation Rate, Erythrocyte: 16 mm/Hr    Culture - Urine (08.01.18 @ 07:20)    Specimen Source: .Urine Clean Catch (Midstream)    Culture Results:   No growth    Culture - Blood (07.31.18 @ 17:59)    Specimen Source: .Blood None    Culture Results:   No growth to date.    XR CHEST PA LAT 2V:  07/30/2018    No radiographic evidence of acute cardiopulmonary disease.     2-D ECHO (TTE) COMPLETE:  07/31/2018     1. Left ventricular ejection fraction, by visual estimation, is 55 to   60%.   2. LV Ejection Fraction by Brown's Method with a biplane EF of 59 %.   3. Spectral Doppler shows impaired relaxation pattern of left   ventricular myocardial filling (Grade I diastolic dysfunction).   4. Mildly enlarged left atrium.   5. Normal right atrial size.   6. Mild mitral annular calcification.   7. Thickening of the anterior and posterior mitral valve leaflets.   8. Cannot exclude a vegetation on atrial aspect of PMVL.   9. Trace tricuspid regurgitation.  10. No evidence of aortic stenosis.    CT CERVICAL SPINE:  08/01/2018      There are destructive erosive changes across the C6-7 disc space with   mild loss of vertebralbody height. There may also be erosive changes   across the C7 T1 disc space posteriorly for example series 602 image 47.    There is thickening of the prevertebral soft tissues C5-T1 without gross   evidence of discrete drainable fluid collection. There is also thickening   of the ventral epidural soft tissues from C4 through T1 likely reflecting   epidural inflammation. This measures up to 4 mm in thickness.    Disc space gas is noted C5-6 and C7-T1.    At C2-3 there is no significant disc herniation, canal or foraminal   stenosis.    At C3-4 there is small disc osteophyte indenting the ventral thecal sac,   ligamentum flavum infolding indenting the dorsal thecal sac and uncinate   and facet hypertrophy with mild foraminal stenosis.    At C4-5 there is disc osteophyte indenting the ventral thecal sac,   ligamentum flavum infolding indenting the dorsal thecal sac and uncinate   and facet hypertrophy with mild foraminal stenosis.    At C5-6 there is disc osteophyte indenting the ventral thecal sac and   uncinate and facet hypertrophy with moderate to severe foraminal stenosis   worse on the left.    At C6-7 there is disc osteophyte indenting the ventral thecal sac and   uncinate spurring with mild foraminal narrowing.    At C7-T1 there is uncinate spurring with mild foraminal narrowing.    IMPRESSION:    1.  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    2.  There may be early involvement of the C7-T1 disc space, recommend   correlation with the patient's previous outside MRI.    3.  Multilevel degenerative changes as described.    JUANA: 08/02/2018       1. Incomplete study secondary to system malfunction with sudden machine   shut down but no evidence of valvular vegetations.   2. Normal left ventricular size and wall thicknesses, with normal   systolic function.   3. Left ventricular ejection fraction, by visual estimation, is 50 to   55%.    Outpt MRI images (not seen personally):  +  contrast enhancement and disc destruction of cervical spine with epidural tissue, resulting in thecal sac impingement.

## 2018-08-07 NOTE — PROGRESS NOTE ADULT - ASSESSMENT
66 yo male with PMHx of DM (diet controlled), HTN, CAD s/p CABG, was sent to the ED by Dr. Diez for evaluation of upper back pain.     # Upper back pain due to cervical osteomyelitis and epidural inflammation.    - TB Gold, HIV , blood culture and urine culture are negative.   - Can start antibiotics   - Biopsy done. Results pending.   - JUANA negative for vegetations.   - Will need PICC   - Start IV cefepime and vancomycin     #Diabetes mellitus, HTN.    # Full code

## 2018-08-08 LAB
HCT VFR BLD CALC: 35.4 % — LOW (ref 42–52)
HGB BLD-MCNC: 12.7 G/DL — LOW (ref 14–18)
MCHC RBC-ENTMCNC: 32.8 PG — HIGH (ref 27–31)
MCHC RBC-ENTMCNC: 35.9 G/DL — SIGNIFICANT CHANGE UP (ref 32–37)
MCV RBC AUTO: 91.5 FL — SIGNIFICANT CHANGE UP (ref 80–94)
NRBC # BLD: 0 /100 WBCS — SIGNIFICANT CHANGE UP (ref 0–0)
PLATELET # BLD AUTO: 74 K/UL — LOW (ref 130–400)
RBC # BLD: 3.87 M/UL — LOW (ref 4.7–6.1)
RBC # FLD: 13.2 % — SIGNIFICANT CHANGE UP (ref 11.5–14.5)
SURGICAL PATHOLOGY STUDY: SIGNIFICANT CHANGE UP
WBC # BLD: 12.23 K/UL — HIGH (ref 4.8–10.8)
WBC # FLD AUTO: 12.23 K/UL — HIGH (ref 4.8–10.8)

## 2018-08-08 RX ORDER — OXYCODONE AND ACETAMINOPHEN 5; 325 MG/1; MG/1
2 TABLET ORAL EVERY 4 HOURS
Qty: 0 | Refills: 0 | Status: DISCONTINUED | OUTPATIENT
Start: 2018-08-08 | End: 2018-08-09

## 2018-08-08 RX ADMIN — CEFEPIME 100 MILLIGRAM(S): 1 INJECTION, POWDER, FOR SOLUTION INTRAMUSCULAR; INTRAVENOUS at 13:01

## 2018-08-08 RX ADMIN — ATORVASTATIN CALCIUM 40 MILLIGRAM(S): 80 TABLET, FILM COATED ORAL at 21:41

## 2018-08-08 RX ADMIN — Medication 300 MILLIGRAM(S): at 05:36

## 2018-08-08 RX ADMIN — LOSARTAN POTASSIUM 50 MILLIGRAM(S): 100 TABLET, FILM COATED ORAL at 05:36

## 2018-08-08 RX ADMIN — CEFEPIME 100 MILLIGRAM(S): 1 INJECTION, POWDER, FOR SOLUTION INTRAMUSCULAR; INTRAVENOUS at 05:36

## 2018-08-08 RX ADMIN — CEFEPIME 100 MILLIGRAM(S): 1 INJECTION, POWDER, FOR SOLUTION INTRAMUSCULAR; INTRAVENOUS at 21:41

## 2018-08-08 RX ADMIN — Medication 4: at 17:46

## 2018-08-08 RX ADMIN — Medication 6: at 12:55

## 2018-08-08 RX ADMIN — PANTOPRAZOLE SODIUM 40 MILLIGRAM(S): 20 TABLET, DELAYED RELEASE ORAL at 12:56

## 2018-08-08 RX ADMIN — Medication 100 MILLIGRAM(S): at 13:01

## 2018-08-08 RX ADMIN — OXYCODONE AND ACETAMINOPHEN 2 TABLET(S): 5; 325 TABLET ORAL at 21:47

## 2018-08-08 RX ADMIN — OXYCODONE HYDROCHLORIDE 10 MILLIGRAM(S): 5 TABLET ORAL at 05:38

## 2018-08-08 RX ADMIN — OXYCODONE HYDROCHLORIDE 10 MILLIGRAM(S): 5 TABLET ORAL at 17:43

## 2018-08-08 RX ADMIN — Medication 100 MILLIGRAM(S): at 05:36

## 2018-08-08 RX ADMIN — Medication 1 TABLET(S): at 12:56

## 2018-08-08 RX ADMIN — Medication 300 MILLIGRAM(S): at 17:45

## 2018-08-08 RX ADMIN — Medication 100 MILLIGRAM(S): at 21:42

## 2018-08-08 RX ADMIN — SENNA PLUS 2 TABLET(S): 8.6 TABLET ORAL at 21:42

## 2018-08-08 NOTE — PROGRESS NOTE ADULT - SUBJECTIVE AND OBJECTIVE BOX
s/p cervical corpectomy and fusion C 5-T1    T(C): 35.2 (08-08-18 @ 08:12), Max: 36.9 (08-07-18 @ 23:32)  HR: 68 (08-08-18 @ 08:12) (68 - 84)  BP: 126/60 (08-08-18 @ 08:12) (126/60 - 168/78)  RR: 18 (08-08-18 @ 08:12) (18 - 18)  SpO2: --  Wt(kg): --    CBC Full  -  ( 08 Aug 2018 05:53 )  WBC Count : 12.23 K/uL  Hemoglobin : 12.7 g/dL  Hematocrit : 35.4 %  Platelet Count - Automated : 74 K/uL  Mean Cell Volume : 91.5 fL  Mean Cell Hemoglobin : 32.8 pg  Mean Cell Hemoglobin Concentration : 35.9 g/dL  Auto Neutrophil # : x  Auto Lymphocyte # : x  Auto Monocyte # : x  Auto Eosinophil # : x  Auto Basophil # : x  Auto Neutrophil % : x  Auto Lymphocyte % : x  Auto Monocyte % : x  Auto Eosinophil % : x  Auto Basophil % : x    08-06    145  |  106  |  9<L>  ----------------------------<  118<H>  3.8   |  23  |  0.8    Ca    8.5      06 Aug 2018 20:30    TPro  5.7<L>  /  Alb  3.6  /  TBili  0.5  /  DBili  x   /  AST  22  /  ALT  17  /  AlkPhos  75  08-06    PT/INR - ( 06 Aug 2018 20:30 )   PT: 12.90 sec;   INR: 1.20 ratio         PTT - ( 07 Aug 2018 07:28 )  PTT:29.9     Plan- Case D/W Dr Diez who spoke to Dr Keita, she recomends picc line Continue cefepime and Vancomycin IV with possible D/C home tomorrow.

## 2018-08-08 NOTE — PROGRESS NOTE ADULT - SUBJECTIVE AND OBJECTIVE BOX
[facility]  MRN-[id number]      POD# 2  S/P  C6-C7 corpectomy with  C5-T1 fusion 8/6/18        Current issues:  67y Male  ambulating no distress.  no pain      HPI:  68 yo male with PMHx of DM (diet controlled), HTN, CAD s/p CABG, presents to the ED with worsening pain in the neck and upper back. Pain is present since the past 6 weeks, in the neck and upper back and the shoulders b/l, now radiating to the left shoulder since the last 5-6 days. He describes the pain as steady and dull usually but becomes sharp during movement, rates '20/10' in intensity, aggravated by the movement, partially relieved for about 5-6 hours with Oxycodone (10mg 2 tabs twice a day). No visible skin changes in the back, no history of trauma, no stiffness. No previous episodes of similar pain. Patient denies any history of fever, chills, rigor, dizziness or LOC. No history of loss of appetite, fatigue. Patient reports history of night sweats but did not see that as anything abnormal (before the pain started). Patient reports tingling sensation in right hand fingers at times but no weakness or numbness anywhere. No vision and hearing changes. Patient has history of travel last february to [facility], [facility] and [facility][note 3]. No history of any sick contacts. Patient has had 3 MRIs of the cervical spine (last 07/24/2018) because of chronic neck pain and was referred by Dr. Sunita Diez (neurosurgery) for inpatient treatment to rule out osteomyelitis based on MRI findings suspicious for osteomyelitis.  Patient has a CD disc with his MRI results (put in his ED chart) (30 Jul 2018 11:42)      Allergies    No Known Allergies    Intolerances      MEDICATIONS  (STANDING):  atorvastatin 40 milliGRAM(s) Oral at bedtime  cefepime   IVPB      cefepime   IVPB 2000 milliGRAM(s) IV Intermittent every 8 hours  dextrose 5%. 1000 milliLiter(s) (50 mL/Hr) IV Continuous <Continuous>  dextrose 50% Injectable 12.5 Gram(s) IV Push once  dextrose 50% Injectable 25 Gram(s) IV Push once  dextrose 50% Injectable 25 Gram(s) IV Push once  docusate sodium 100 milliGRAM(s) Oral three times a day  insulin lispro (HumaLOG) corrective regimen sliding scale   SubCutaneous three times a day before meals  losartan 50 milliGRAM(s) Oral daily  multivitamin 1 Tablet(s) Oral daily  oxyCODONE    IR 10 milliGRAM(s) Oral two times a day  pantoprazole    Tablet 40 milliGRAM(s) Oral daily  senna 2 Tablet(s) Oral at bedtime  vancomycin  IVPB 1500 milliGRAM(s) IV Intermittent every 12 hours    MEDICATIONS  (PRN):  acetaminophen   Tablet 650 milliGRAM(s) Oral every 6 hours PRN For Temp greater than 38.5 C (101.3 F)  dextrose 40% Gel 15 Gram(s) Oral once PRN Blood Glucose LESS THAN 70 milliGRAM(s)/deciliter  glucagon  Injectable 1 milliGRAM(s) IntraMuscular once PRN Glucose LESS THAN 70 milligrams/deciliter  ondansetron Injectable 4 milliGRAM(s) IV Push every 6 hours PRN Nausea and/or Vomiting  senna 2 Tablet(s) Oral at bedtime PRN Constipation    Vital Signs Last 24 Hrs  T(C): 35.2 (08 Aug 2018 08:12), Max: 36.9 (07 Aug 2018 23:32)  T(F): 95.4 (08 Aug 2018 08:12), Max: 98.4 (07 Aug 2018 23:32)  HR: 68 (08 Aug 2018 08:12) (68 - 84)  BP: 126/60 (08 Aug 2018 08:12) (126/60 - 168/78)  BP(mean): --  RR: 18 (08 Aug 2018 08:12) (18 - 18)  SpO2: --    I&O's Detail    07 Aug 2018 07:01  -  08 Aug 2018 07:00  --------------------------------------------------------  IN:    Oral Fluid: 300 mL    sodium chloride 0.9%: 300 mL  Total IN: 600 mL    OUT:    Indwelling Catheter - Urethral: 2560 mL  Total OUT: 2560 mL    Total NET: -1960 mL        08-06    145  |  106  |  9<L>  ----------------------------<  118<H>  3.8   |  23  |  0.8    Ca    8.5      06 Aug 2018 20:30    TPro  5.7<L>  /  Alb  3.6  /  TBili  0.5  /  DBili  x   /  AST  22  /  ALT  17  /  AlkPhos  75  08-06                          12.7   12.23 )-----------( 74       ( 08 Aug 2018 05:53 )             35.4           Exam:    awake, alert, NAD, follows commands,   no pain, BURTON with good strength sensation intact, hard collar in place      Plan:    HIT ordered  patient states he has known hx of thrombocytopenia for 30 yr hx   received one single donor unit platelets last night.    awaiting for ID f/u for decision on antibiotics IV vs PO and duration   so prepare for pending dc home    Hematology noted in chart f/u o/p patient lives in [place]   will review with attending           Home Medications:  aspirin 325 mg oral tablet: 1 tab(s) orally once a day (31 Jul 2018 02:07)  atenolol-chlorthalidone 50 mg-25 mg oral tablet: 1 tab(s) orally once a day; non compliant (30 Jul 2018 11:56)  oxyCODONE 10 mg oral tablet, extended release: 10 milligram(s) orally 2 tabs 2 times a day (30 Jul 2018 11:56)    PAST MEDICAL & SURGICAL HISTORY:  Coronary artery disease: s/p CABG  HTN (hypertension): On atenolol-chlorthalidone but doesnt take it  Diabetes mellitus: Diet-controlled; has taken metformin in the past as per patient  History of coronary artery bypass surgery

## 2018-08-08 NOTE — DIETITIAN INITIAL EVALUATION ADULT. - DIET TYPE
consistent carbohydrate (no snacks)/was also on dash/tlc diet previously, however pt has well controlled HTN and follows heart healthy diet, no need to necessarily add restriction at this time

## 2018-08-08 NOTE — DIETITIAN INITIAL EVALUATION ADULT. - OTHER INFO
RD assessment as LOS. Pt w/ cervical osteomyelitis and epidural inflammation. S/P cervical corpectomy and fusion on 8/7. Pt reports good zhane/PO intake, consuming mostly % of documented meals. Does not know UBW but denies any recent wt changes, denies any food allergies. Reports some eating difficulty 2/2 surgery, but denies need for soft foods. Last BM on 8/4- on bowel regimen.

## 2018-08-08 NOTE — DIETITIAN INITIAL EVALUATION ADULT. - ENERGY NEEDS
Calories: 0979-3777 kcals/day (MSJ x 1.2-1.3)  Protein: 81-97 g/day (1-1.2 g/kg ABW)  Fluids: 1ml/kcal

## 2018-08-08 NOTE — PROGRESS NOTE ADULT - NSHPATTENDINGPLANDISCUSS_GEN_ALL_CORE
neurosurgery DISHA Flores
neurosurgery DISHA Kessler
neurosurgery Maribel Pompa
neurosurgery PA.
neurosurgery PA

## 2018-08-08 NOTE — PROGRESS NOTE ADULT - ATTENDING COMMENTS
66 yo male with PMHx of DM (diet controlled), HTN, CAD s/p CABG, was sent to the ED by neurosurgery (Dr. Diez) due to complaints of chronic neck pain (6 weeks) and MRI findings suspicious for cervical osteomyelitis.     Patient unavailable for exam today.    #Possible Cervical osteomyelitis  -Id recs appreciated  OR by NSX today  Needs a PICC after the surgery for abx therapy   Pain well controlled on current pain regimen          -Chest X-ray no findings concerning for pulmonary TB lesion  QuantiFeron gold  neg  -Continue oxycodone 10mg 2 tabs bid for pain control    #Diabetes mellitus   -Check fingerstick glucose AC/HS  -Carb consistent diet    #chronic thrombocytopenia :   Per heme, maintain above 100 for neurosurgical procedure.     #HTN    -Start Losartan 50mg once a day as the patient is diabetic.  -Hold Atenolol-chlorthalidone 50/25mg as the patient was not taking it at home.  -DASH diet  - Well controlled     #Miscellaneous  -DVT ppx (Lovenox 40mg sq once daily)  -GI ppx (not indicated)  -Diet: Carb consistent DASH diet  -Activity: Ambulate as tolerated  -Full code
Agree with above.  Also provided update from pathology who notes that there is evidence of cocci noted within the cartilage and soft tissue specimens.  She will proceed with obtaining Gram stain on this.    We'll await results from microbiology as well.  Plan for outpatient IV antibiotics.
Patient with persistent thrombocytopenia, unclear etiology.  we'll need hematology evaluation.  Please have platelets on-call to the operating room and given history of aspirin, and low platelet count.  We'll plan on surgery tomorrow
Patient's condition plan discussed with neurosurgery PA.  Agree with physical therapy and mobilization out of bed.  Patient will require 1 unit of platelets given low platelet count postoperatively, in order to prevent postoperative hematoma.    Maintain hard cervical collar at all times.    Dexamethasone for 24 hours total.    We'll plan on sending off hit antibody given thrombocytopenia.  Continue to encourage patient mobilization, hold subcutaneous heparin for now.    follow-up intraoperative cultures and pathology.
agree with the above plan.    Patient without any evidence of dysphasia, wearing hard cervical collar and tolerating well.  Patient admits pain is tolerable.    Awaiting cultures from operating room.
Agree with above.  Continue to follow examination closely.  Follow-up postoperative labs.

## 2018-08-09 VITALS
RESPIRATION RATE: 20 BRPM | SYSTOLIC BLOOD PRESSURE: 175 MMHG | HEART RATE: 99 BPM | TEMPERATURE: 100 F | DIASTOLIC BLOOD PRESSURE: 88 MMHG

## 2018-08-09 LAB
PF4 HEPARIN CMPLX AB SER-ACNC: NEGATIVE — SIGNIFICANT CHANGE UP
PF4 HEPARIN CMPLX AB SERPL QL IA: 0.14 ABS — SIGNIFICANT CHANGE UP

## 2018-08-09 RX ORDER — LOSARTAN POTASSIUM 100 MG/1
1 TABLET, FILM COATED ORAL
Qty: 0 | Refills: 0 | DISCHARGE
Start: 2018-08-09

## 2018-08-09 RX ORDER — CEFEPIME 1 G/1
2000 INJECTION, POWDER, FOR SOLUTION INTRAMUSCULAR; INTRAVENOUS ONCE
Qty: 0 | Refills: 0 | Status: DISCONTINUED | OUTPATIENT
Start: 2018-08-09 | End: 2018-08-09

## 2018-08-09 RX ORDER — VANCOMYCIN HCL 1 G
1500 VIAL (EA) INTRAVENOUS
Qty: 0 | Refills: 0 | DISCHARGE
Start: 2018-08-09

## 2018-08-09 RX ORDER — ASPIRIN/CALCIUM CARB/MAGNESIUM 324 MG
1 TABLET ORAL
Qty: 0 | Refills: 0 | COMMUNITY

## 2018-08-09 RX ORDER — CEFEPIME 1 G/1
2000 INJECTION, POWDER, FOR SOLUTION INTRAMUSCULAR; INTRAVENOUS
Qty: 0 | Refills: 0 | DISCHARGE
Start: 2018-08-09

## 2018-08-09 RX ORDER — ATORVASTATIN CALCIUM 80 MG/1
1 TABLET, FILM COATED ORAL
Qty: 0 | Refills: 0 | DISCHARGE
Start: 2018-08-09

## 2018-08-09 RX ORDER — OXYCODONE AND ACETAMINOPHEN 5; 325 MG/1; MG/1
1 TABLET ORAL
Qty: 20 | Refills: 0
Start: 2018-08-09 | End: 2018-08-13

## 2018-08-09 RX ADMIN — Medication 1 TABLET(S): at 12:25

## 2018-08-09 RX ADMIN — PANTOPRAZOLE SODIUM 40 MILLIGRAM(S): 20 TABLET, DELAYED RELEASE ORAL at 12:25

## 2018-08-09 RX ADMIN — ATORVASTATIN CALCIUM 40 MILLIGRAM(S): 80 TABLET, FILM COATED ORAL at 21:09

## 2018-08-09 RX ADMIN — Medication 300 MILLIGRAM(S): at 17:49

## 2018-08-09 RX ADMIN — LOSARTAN POTASSIUM 50 MILLIGRAM(S): 100 TABLET, FILM COATED ORAL at 06:06

## 2018-08-09 RX ADMIN — Medication 6: at 12:24

## 2018-08-09 RX ADMIN — Medication 100 MILLIGRAM(S): at 06:06

## 2018-08-09 RX ADMIN — SENNA PLUS 2 TABLET(S): 8.6 TABLET ORAL at 21:10

## 2018-08-09 RX ADMIN — OXYCODONE HYDROCHLORIDE 10 MILLIGRAM(S): 5 TABLET ORAL at 06:06

## 2018-08-09 RX ADMIN — Medication 100 MILLIGRAM(S): at 21:09

## 2018-08-09 RX ADMIN — Medication 100 MILLIGRAM(S): at 14:36

## 2018-08-09 RX ADMIN — CEFEPIME 100 MILLIGRAM(S): 1 INJECTION, POWDER, FOR SOLUTION INTRAMUSCULAR; INTRAVENOUS at 21:10

## 2018-08-09 RX ADMIN — OXYCODONE HYDROCHLORIDE 10 MILLIGRAM(S): 5 TABLET ORAL at 17:49

## 2018-08-09 RX ADMIN — CEFEPIME 100 MILLIGRAM(S): 1 INJECTION, POWDER, FOR SOLUTION INTRAMUSCULAR; INTRAVENOUS at 14:36

## 2018-08-09 RX ADMIN — Medication 300 MILLIGRAM(S): at 06:07

## 2018-08-09 RX ADMIN — Medication 4: at 17:50

## 2018-08-09 RX ADMIN — CEFEPIME 100 MILLIGRAM(S): 1 INJECTION, POWDER, FOR SOLUTION INTRAMUSCULAR; INTRAVENOUS at 06:06

## 2018-08-09 NOTE — DISCHARGE NOTE ADULT - ADDITIONAL INSTRUCTIONS
follow up with Dr. Diez in 10- 14 days. Follow up with your Primary care doctor. Keep incision clean and dry. May shower. May remove outer dressing in 4-5 days, do not scrub incision.

## 2018-08-09 NOTE — DISCHARGE NOTE ADULT - CARE PLAN
Principal Discharge DX:	Cervical pain (neck)  Goal:	s/p C6-7 corpectomy with C5-T1 fusion  Assessment and plan of treatment:	s/p surgical fixation

## 2018-08-09 NOTE — PROCEDURE NOTE - NSPROCDETAILS_GEN_ALL_CORE
sterile technique, catheter placed/location identified, draped/prepped, sterile technique used/sterile dressing applied/ultrasound guidance/supine position

## 2018-08-09 NOTE — PROCEDURE NOTE - NSPOSTCAREGUIDE_GEN_A_CORE
Instructed patient/caregiver to follow-up with primary care physician/Keep the cast/splint/dressing clean and dry/Instructed patient/caregiver regarding signs and symptoms of infection/Care for catheter as per unit/ICU protocols/Verbal/written post procedure instructions were given to patient/caregiver

## 2018-08-09 NOTE — DISCHARGE NOTE ADULT - MEDICATION SUMMARY - MEDICATIONS TO TAKE
I will START or STAY ON the medications listed below when I get home from the hospital:    aspirin 325 mg oral tablet  -- 1 tab(s) by mouth once a day, please restart on 8/11/18  -- Indication: For Home med    oxyCODONE-acetaminophen 5 mg-325 mg oral tablet  -- 1 tab(s) by mouth every 6 hours, As Needed -Moderate Pain (4 - 6) MDD:4  -- Indication: For pain prn    oxyCODONE 10 mg oral tablet, extended release  -- 10 milligram(s) by mouth 2 tabs 2 times a day  -- Indication: For Home med    losartan 50 mg oral tablet  -- 1 tab(s) by mouth once a day  -- Indication: For Home med    atorvastatin 40 mg oral tablet  -- 1 tab(s) by mouth once a day (at bedtime)  -- Indication: For Home med    atenolol-chlorthalidone 50 mg-25 mg oral tablet  -- 1 tab(s) by mouth once a day; non compliant  -- Indication: For Home med    cefepime  -- 2000 milligram(s) intravenous every 8 hours  -- Indication: For antibitotics    vancomycin 750 mg intravenous injection  -- 1500 milligram(s) intravenous every 12 hours  -- Indication: For antibiotics

## 2018-08-09 NOTE — DISCHARGE NOTE ADULT - PATIENT PORTAL LINK FT
You can access the [facility] Patient Portal, offered by [facility], by registering with the following website: [email or web address]

## 2018-08-09 NOTE — PROCEDURE NOTE - NSINFORMCONSENT_GEN_A_CORE
Benefits, risks, and possible complications of procedure explained to patient/caregiver who verbalized understanding and gave written consent./patient

## 2018-08-09 NOTE — DISCHARGE NOTE ADULT - INSTRUCTIONS
Keep incision clean and dry. May shower. May remove outer dressing in 4-5 days, do not scrub incision. heart healthy

## 2018-08-09 NOTE — PROGRESS NOTE ADULT - SUBJECTIVE AND OBJECTIVE BOX
POD # 3    S/P C6-7 corpectomy and  C5- T 1 fusion    pt seen and examined at bedside pt alert no complaints         Vital Signs Last 24 Hrs  T(C): 37.4 (09 Aug 2018 07:49), Max: 37.7 (08 Aug 2018 16:18)  T(F): 99.4 (09 Aug 2018 07:49), Max: 99.8 (08 Aug 2018 16:18)  HR: 94 (09 Aug 2018 07:49) (85 - 96)  BP: 185/86 (09 Aug 2018 07:49) (123/84 - 185/86)  BP(mean): --  RR: 20 (09 Aug 2018 07:49) (18 - 20)  SpO2: --    PHYSICAL EXAM:  Alert, MAEX4   Strength equal bilaterally     incision clean dry intact       MEDICATIONS:  Antibiotics:  cefepime   IVPB      cefepime   IVPB 2000 milliGRAM(s) IV Intermittent every 8 hours  vancomycin  IVPB 1500 milliGRAM(s) IV Intermittent every 12 hours    Neuro:  acetaminophen   Tablet 650 milliGRAM(s) Oral every 6 hours PRN  ondansetron Injectable 4 milliGRAM(s) IV Push every 6 hours PRN  oxyCODONE    5 mG/acetaminophen 325 mG 2 Tablet(s) Oral every 4 hours PRN  oxyCODONE    IR 10 milliGRAM(s) Oral two times a day    Anticoagulation:    OTHER:  atorvastatin 40 milliGRAM(s) Oral at bedtime  dextrose 40% Gel 15 Gram(s) Oral once PRN  dextrose 50% Injectable 12.5 Gram(s) IV Push once  dextrose 50% Injectable 25 Gram(s) IV Push once  dextrose 50% Injectable 25 Gram(s) IV Push once  docusate sodium 100 milliGRAM(s) Oral three times a day  glucagon  Injectable 1 milliGRAM(s) IntraMuscular once PRN  insulin lispro (HumaLOG) corrective regimen sliding scale   SubCutaneous three times a day before meals  losartan 50 milliGRAM(s) Oral daily  pantoprazole    Tablet 40 milliGRAM(s) Oral daily  senna 2 Tablet(s) Oral at bedtime  senna 2 Tablet(s) Oral at bedtime PRN    IVF:  dextrose 5%. 1000 milliLiter(s) IV Continuous <Continuous>  multivitamin 1 Tablet(s) Oral daily        LABS:                        12.7   12.23 )-----------( 74       ( 08 Aug 2018 05:53 )             35.4             A/P       S/p C6-7 corpectomy with C5-T1 fusion              await for authorization for antibiotics

## 2018-08-09 NOTE — PROGRESS NOTE ADULT - PROVIDER SPECIALTY LIST ADULT
Cardiology
Hospitalist
Infectious Disease
Internal Medicine
Neurosurgery
Internal Medicine
Hospitalist
Infectious Disease
Infectious Disease

## 2018-08-09 NOTE — DISCHARGE NOTE ADULT - CARE PROVIDER_API CALL
Sunita Diez (MD), Surgery  [facility] [place] [address]  Phone: [phone]  Fax: [phone]

## 2018-08-09 NOTE — PROGRESS NOTE ADULT - ASSESSMENT
68 yo male with PMHx of DM (diet controlled), HTN, CAD s/p CABG, was sent to the ED by Dr. Diez for evaluation of upper back pain.     # Upper back pain due to cervical osteomyelitis and epidural inflammation.    - TB Gold, HIV , blood culture and urine culture are negative.   - Can start antibiotics   - Biopsy done. Results pending.   - JUANA negative for vegetations.   - Will need PICC   - Start IV cefepime and vancomycin     #Diabetes mellitus, HTN.    # Full code 68 yo male with PMHx of DM (diet controlled), HTN, CAD s/p CABG, was sent to the ED by Dr. Diez for evaluation of upper back pain.     # Upper back pain due to cervical osteomyelitis and epidural inflammation.    - TB Gold, HIV , blood culture, surgical biopsy and urine culture are negative.   - PICC placed.   - Continue with IV cefepime and vancomycin for at least 8 weeks.   - Weekly CBC, CMP, vancomycin trough as outpt.     #Diabetes mellitus, HTN.    # Full code

## 2018-08-09 NOTE — PROGRESS NOTE ADULT - SUBJECTIVE AND OBJECTIVE BOX
INTERVAL HPI/OVERNIGHT EVENTS:  S/P JUANA  8/6 - Lysis of C6-7 disc space and bone as well as epidural soft tissue.     No Known Allergies  Alert, oriented, s1s2, no wheezing, comfortable, abdomen soft, neck brace.   s/p biopsy of cervical spine.     acetaminophen   Tablet 650 milliGRAM(s) Oral every 6 hours PRN  atorvastatin 40 milliGRAM(s) Oral at bedtime  dexamethasone  Injectable 4 milliGRAM(s) IV Push every 6 hours  dextrose 40% Gel 15 Gram(s) Oral once PRN  dextrose 5%. 1000 milliLiter(s) IV Continuous <Continuous>  dextrose 50% Injectable 12.5 Gram(s) IV Push once  dextrose 50% Injectable 25 Gram(s) IV Push once  dextrose 50% Injectable 25 Gram(s) IV Push once  docusate sodium 100 milliGRAM(s) Oral daily  docusate sodium 100 milliGRAM(s) Oral three times a day  glucagon  Injectable 1 milliGRAM(s) IntraMuscular once PRN  insulin lispro (HumaLOG) corrective regimen sliding scale   SubCutaneous four times a day before meals  losartan 50 milliGRAM(s) Oral daily  multivitamin 1 Tablet(s) Oral daily  ondansetron Injectable 4 milliGRAM(s) IV Push every 6 hours PRN  oxyCODONE    IR 10 milliGRAM(s) Oral two times a day  pantoprazole    Tablet 40 milliGRAM(s) Oral daily  senna 2 Tablet(s) Oral at bedtime  senna 2 Tablet(s) Oral at bedtime PRN  sodium chloride 0.9%. 1000 milliLiter(s) IV Continuous <Continuous>      Vital Signs Last 24 Hrs  T(C): 36.6 (07 Aug 2018 16:01), Max: 36.9 (06 Aug 2018 21:30)  T(F): 97.8 (07 Aug 2018 16:01), Max: 98.5 (06 Aug 2018 21:30)  HR: 84 (07 Aug 2018 16:01) (65 - 100)  BP: 168/78 (07 Aug 2018 16:01) (127/60 - 173/97)  BP(mean): --  RR: 18 (07 Aug 2018 16:01) (9 - 18)  SpO2: 98% (06 Aug 2018 22:00) (94% - 100%)    LABS:                        13.1   5.49  )-----------( 67       ( 06 Aug 2018 20:30 )             36.5       HIV-1/2 Antigen/Antibody Screen by [facility] (07.31.18 @ 17:59)    HIV-1/2 Combo Result: Nonreact:     Quantiferon - Gold Tuberculosis (07.30.18 @ 16:20)    Quantiferon - Gold Tuberculosis Result: Negative    Hemoglobin A1C with Mean Plasma Glucose (07.31.18 @ 07:59)    Hemoglobin A1C, Whole Blood: 8.5 %    Sedimentation Rate, Erythrocyte (07.30.18 @ 16:20)    Sedimentation Rate, Erythrocyte: 16 mm/Hr    Culture - Urine (08.01.18 @ 07:20)    Specimen Source: .Urine Clean Catch (Midstream)    Culture Results:   No growth    Culture - Blood (07.31.18 @ 17:59)    Specimen Source: .Blood None    Culture Results:   No growth to date.    XR CHEST PA LAT 2V:  07/30/2018    No radiographic evidence of acute cardiopulmonary disease.     2-D ECHO (TTE) COMPLETE:  07/31/2018     1. Left ventricular ejection fraction, by visual estimation, is 55 to   60%.   2. LV Ejection Fraction by Brown's Method with a biplane EF of 59 %.   3. Spectral Doppler shows impaired relaxation pattern of left   ventricular myocardial filling (Grade I diastolic dysfunction).   4. Mildly enlarged left atrium.   5. Normal right atrial size.   6. Mild mitral annular calcification.   7. Thickening of the anterior and posterior mitral valve leaflets.   8. Cannot exclude a vegetation on atrial aspect of PMVL.   9. Trace tricuspid regurgitation.  10. No evidence of aortic stenosis.    CT CERVICAL SPINE:  08/01/2018      There are destructive erosive changes across the C6-7 disc space with   mild loss of vertebralbody height. There may also be erosive changes   across the C7 T1 disc space posteriorly for example series 602 image 47.    There is thickening of the prevertebral soft tissues C5-T1 without gross   evidence of discrete drainable fluid collection. There is also thickening   of the ventral epidural soft tissues from C4 through T1 likely reflecting   epidural inflammation. This measures up to 4 mm in thickness.    Disc space gas is noted C5-6 and C7-T1.    At C2-3 there is no significant disc herniation, canal or foraminal   stenosis.    At C3-4 there is small disc osteophyte indenting the ventral thecal sac,   ligamentum flavum infolding indenting the dorsal thecal sac and uncinate   and facet hypertrophy with mild foraminal stenosis.    At C4-5 there is disc osteophyte indenting the ventral thecal sac,   ligamentum flavum infolding indenting the dorsal thecal sac and uncinate   and facet hypertrophy with mild foraminal stenosis.    At C5-6 there is disc osteophyte indenting the ventral thecal sac and   uncinate and facet hypertrophy with moderate to severe foraminal stenosis   worse on the left.    At C6-7 there is disc osteophyte indenting the ventral thecal sac and   uncinate spurring with mild foraminal narrowing.    At C7-T1 there is uncinate spurring with mild foraminal narrowing.    IMPRESSION:    1.  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    2.  There may be early involvement of the C7-T1 disc space, recommend   correlation with the patient's previous outside MRI.    3.  Multilevel degenerative changes as described.    JUANA: 08/02/2018       1. Incomplete study secondary to system malfunction with sudden machine   shut down but no evidence of valvular vegetations.   2. Normal left ventricular size and wall thicknesses, with normal   systolic function.   3. Left ventricular ejection fraction, by visual estimation, is 50 to   55%.    Outpt MRI images (not seen personally):  +  contrast enhancement and disc destruction of cervical spine with epidural tissue, resulting in thecal sac impingement. INTERVAL HPI/OVERNIGHT EVENTS:  8/2 S/P JUANA  8/6 - Lysis of C6-7 disc space and bone as well as epidural soft tissue.   8/9 PICC line placed    No Known Allergies  Alert, oriented, s1s2, no wheezing, comfortable, abdomen soft, neck brace.     acetaminophen   Tablet 650 milliGRAM(s) Oral every 6 hours PRN  atorvastatin 40 milliGRAM(s) Oral at bedtime  cefepime   IVPB      cefepime   IVPB 2000 milliGRAM(s) IV Intermittent every 8 hours  dextrose 40% Gel 15 Gram(s) Oral once PRN  dextrose 5%. 1000 milliLiter(s) IV Continuous <Continuous>  dextrose 50% Injectable 12.5 Gram(s) IV Push once  dextrose 50% Injectable 25 Gram(s) IV Push once  dextrose 50% Injectable 25 Gram(s) IV Push once  docusate sodium 100 milliGRAM(s) Oral three times a day  glucagon  Injectable 1 milliGRAM(s) IntraMuscular once PRN  insulin lispro (HumaLOG) corrective regimen sliding scale   SubCutaneous three times a day before meals  losartan 50 milliGRAM(s) Oral daily  multivitamin 1 Tablet(s) Oral daily  ondansetron Injectable 4 milliGRAM(s) IV Push every 6 hours PRN  oxyCODONE    5 mG/acetaminophen 325 mG 2 Tablet(s) Oral every 4 hours PRN  oxyCODONE    IR 10 milliGRAM(s) Oral two times a day  pantoprazole    Tablet 40 milliGRAM(s) Oral daily  senna 2 Tablet(s) Oral at bedtime  senna 2 Tablet(s) Oral at bedtime PRN  vancomycin  IVPB 1500 milliGRAM(s) IV Intermittent every 12 hours    Vital Signs Last 24 Hrs  T(C): 37.4 (09 Aug 2018 07:49), Max: 37.7 (08 Aug 2018 16:18)  T(F): 99.4 (09 Aug 2018 07:49), Max: 99.8 (08 Aug 2018 16:18)  HR: 94 (09 Aug 2018 07:49) (85 - 96)  BP: 185/86 (09 Aug 2018 07:49) (123/84 - 185/86)  BP(mean): --  RR: 20 (09 Aug 2018 07:49) (18 - 20)  SpO2: --    LABS:                        12.7   12.23 )-----------( 74       ( 08 Aug 2018 05:53 )             35.4                         13.1   5.49  )-----------( 67       ( 06 Aug 2018 20:30 )             36.5     Culture - Fungal, Other (08.06.18 @ 21:35)    Specimen Source: .Other None    Culture Results:   Testing in progress    Culture - Acid Fast - Other w/Smear (08.06.18 @ 21:35)    Specimen Source: .Other None    Acid Fast Bacilli Smear:   No acid fast bacilli seen by fluorochrome stain    Culture - Surgical Swab (08.06.18 @ 21:35)    Specimen Source: .Surgical Swab None    Culture Results:   No growth    Culture - Surgical Swab (08.06.18 @ 21:30)    Specimen Source: .Surgical Swab None    Culture Results:   No growth    HIV-1/2 Antigen/Antibody Screen by [facility] (07.31.18 @ 17:59)    HIV-1/2 Combo Result: Nonreact:     Quantiferon - Gold Tuberculosis (07.30.18 @ 16:20)    Quantiferon - Gold Tuberculosis Result: Negative    Hemoglobin A1C with Mean Plasma Glucose (07.31.18 @ 07:59)    Hemoglobin A1C, Whole Blood: 8.5 %    Sedimentation Rate, Erythrocyte (07.30.18 @ 16:20)    Sedimentation Rate, Erythrocyte: 16 mm/Hr    Culture - Urine (08.01.18 @ 07:20)    Specimen Source: .Urine Clean Catch (Midstream)    Culture Results:   No growth    Culture - Blood (07.31.18 @ 17:59)    Specimen Source: .Blood None    Culture Results:   No growth to date.    XR CHEST PA LAT 2V:  07/30/2018    No radiographic evidence of acute cardiopulmonary disease.     2-D ECHO (TTE) COMPLETE:  07/31/2018     1. Left ventricular ejection fraction, by visual estimation, is 55 to   60%.   2. LV Ejection Fraction by Brown's Method with a biplane EF of 59 %.   3. Spectral Doppler shows impaired relaxation pattern of left   ventricular myocardial filling (Grade I diastolic dysfunction).   4. Mildly enlarged left atrium.   5. Normal right atrial size.   6. Mild mitral annular calcification.   7. Thickening of the anterior and posterior mitral valve leaflets.   8. Cannot exclude a vegetation on atrial aspect of PMVL.   9. Trace tricuspid regurgitation.  10. No evidence of aortic stenosis.    CT CERVICAL SPINE:  08/01/2018      There are destructive erosive changes across the C6-7 disc space with   mild loss of vertebral body height. There may also be erosive changes   across the C7 T1 disc space posteriorly for example series 602 image 47.    There is thickening of the prevertebral soft tissues C5-T1 without gross   evidence of discrete drainable fluid collection. There is also thickening   of the ventral epidural soft tissues from C4 through T1 likely reflecting   epidural inflammation. This measures up to 4 mm in thickness.    Disc space gas is noted C5-6 and C7-T1.    At C2-3 there is no significant disc herniation, canal or foraminal   stenosis.    At C3-4 there is small disc osteophyte indenting the ventral thecal sac,   ligamentum flavum infolding indenting the dorsal thecal sac and uncinate   and facet hypertrophy with mild foraminal stenosis.    At C4-5 there is disc osteophyte indenting the ventral thecal sac,   ligamentum flavum infolding indenting the dorsal thecal sac and uncinate   and facet hypertrophy with mild foraminal stenosis.    At C5-6 there is disc osteophyte indenting the ventral thecal sac and   uncinate and facet hypertrophy with moderate to severe foraminal stenosis   worse on the left.    At C6-7 there is disc osteophyte indenting the ventral thecal sac and   uncinate spurring with mild foraminal narrowing.    At C7-T1 there is uncinate spurring with mild foraminal narrowing.    IMPRESSION:    1.  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    2.  There may be early involvement of the C7-T1 disc space, recommend   correlation with the patient's previous outside MRI.    3.  Multilevel degenerative changes as described.    JUANA: 08/02/2018       1. Incomplete study secondary to system malfunction with sudden machine   shut down but no evidence of valvular vegetations.   2. Normal left ventricular size and wall thicknesses, with normal   systolic function.   3. Left ventricular ejection fraction, by visual estimation, is 50 to   55%.    Outpt MRI (images not seen personally):  +  contrast enhancement and disc destruction of cervical spine with epidural tissue, resulting in thecal sac impingement.

## 2018-08-09 NOTE — DISCHARGE NOTE ADULT - NS AS ACTIVITY OBS
Do not drive or operate machinery/Walking-Outdoors allowed/Stairs allowed/Walking-Indoors allowed/No Heavy lifting/straining/Showering allowed

## 2018-08-09 NOTE — DISCHARGE NOTE ADULT - HOSPITAL COURSE
66 yo male with PMHx of DM (diet controlled), HTN, CAD s/p CABG, presents to the ED with worsening pain in the neck and upper back. Pain is present since the past 6 weeks, in the neck and upper back and the shoulders b/l, now radiating to the left shoulder since the last 5-6 days. He describes the pain as steady and dull usually but becomes sharp during movement, rates '20/10' in intensity, aggravated by the movement, partially relieved for about 5-6 hours with Oxycodone (10mg 2 tabs twice a day). No visible skin changes in the back, no history of trauma, no stiffness. No previous episodes of similar pain. Patient denies any history of fever, chills, rigor, dizziness or LOC. No history of loss of appetite, fatigue. Patient reports history of night sweats but did not see that as anything abnormal (before the pain started). Patient reports tingling sensation in right hand fingers at times but no weakness or numbness anywhere. No vision and hearing changes. Patient has history of travel last february to [facility] and [facility]. No history of any sick contacts. Patient has had 3 MRIs of the cervical spine (last 07/24/2018) because of chronic neck pain and was referred by Dr. Sunita Diez (neurosurgery) for inpatient treatment to rule out osteomyelitis based on MRI findings suspicious for osteomyelitis.  Patient has a CD disc with his MRI results (put in his ED chart)    Pt went to the [facility] with Dr. Diez on 8.6.18 for C6-7 corpectomy and C5-T1 fusion. Pt did well popst op. Pt was given 1 unit of platetes for low platelet count. Pt worked with Pt/rehab and improved. Pt remained on IV antibiotics throughout course of stay. Pt had a PICC line placed and is going home to receive IV antibiotics as outpatient. Pt improving.

## 2018-08-10 RX ORDER — ATENOLOL AND CHLORTHALIDONE 50; 25 MG/1; MG/1
1 TABLET ORAL
Qty: 30 | Refills: 0
Start: 2018-08-10

## 2018-08-10 RX ORDER — LOSARTAN POTASSIUM 100 MG/1
1 TABLET, FILM COATED ORAL
Qty: 30 | Refills: 0
Start: 2018-08-10

## 2018-08-10 RX ORDER — ASPIRIN/CALCIUM CARB/MAGNESIUM 324 MG
1 TABLET ORAL
Qty: 30 | Refills: 0
Start: 2018-08-10

## 2018-08-10 RX ORDER — ATORVASTATIN CALCIUM 80 MG/1
1 TABLET, FILM COATED ORAL
Qty: 30 | Refills: 0
Start: 2018-08-10

## 2018-08-11 RX ORDER — ASPIRIN/CALCIUM CARB/MAGNESIUM 324 MG
1 TABLET ORAL
Qty: 0 | Refills: 0 | DISCHARGE
Start: 2018-08-11

## 2018-08-14 DIAGNOSIS — I25.10 ATHEROSCLEROTIC HEART DISEASE OF NATIVE CORONARY ARTERY WITHOUT ANGINA PECTORIS: ICD-10-CM

## 2018-08-14 DIAGNOSIS — R26.89 OTHER ABNORMALITIES OF GAIT AND MOBILITY: ICD-10-CM

## 2018-08-14 DIAGNOSIS — Z95.1 PRESENCE OF AORTOCORONARY BYPASS GRAFT: ICD-10-CM

## 2018-08-14 DIAGNOSIS — D69.6 THROMBOCYTOPENIA, UNSPECIFIED: ICD-10-CM

## 2018-08-14 DIAGNOSIS — G89.29 OTHER CHRONIC PAIN: ICD-10-CM

## 2018-08-14 DIAGNOSIS — M46.22 OSTEOMYELITIS OF VERTEBRA, CERVICAL REGION: ICD-10-CM

## 2018-08-14 DIAGNOSIS — F17.210 NICOTINE DEPENDENCE, CIGARETTES, UNCOMPLICATED: ICD-10-CM

## 2018-08-14 DIAGNOSIS — B96.89 OTHER SPECIFIED BACTERIAL AGENTS AS THE CAUSE OF DISEASES CLASSIFIED ELSEWHERE: ICD-10-CM

## 2018-08-14 DIAGNOSIS — E11.69 TYPE 2 DIABETES MELLITUS WITH OTHER SPECIFIED COMPLICATION: ICD-10-CM

## 2018-08-14 DIAGNOSIS — G95.29 OTHER CORD COMPRESSION: ICD-10-CM

## 2018-08-14 DIAGNOSIS — Z79.4 LONG TERM (CURRENT) USE OF INSULIN: ICD-10-CM

## 2018-08-14 DIAGNOSIS — I11.9 HYPERTENSIVE HEART DISEASE WITHOUT HEART FAILURE: ICD-10-CM

## 2018-08-14 DIAGNOSIS — M46.42 DISCITIS, UNSPECIFIED, CERVICAL REGION: ICD-10-CM

## 2018-08-14 DIAGNOSIS — I10 ESSENTIAL (PRIMARY) HYPERTENSION: ICD-10-CM

## 2018-08-14 LAB
CULTURE RESULTS: SIGNIFICANT CHANGE UP
SPECIMEN SOURCE: SIGNIFICANT CHANGE UP

## 2018-09-05 LAB
CULTURE RESULTS: SIGNIFICANT CHANGE UP
SPECIMEN SOURCE: SIGNIFICANT CHANGE UP

## 2018-09-26 LAB
CULTURE RESULTS: SIGNIFICANT CHANGE UP
SPECIMEN SOURCE: SIGNIFICANT CHANGE UP

## 2018-10-05 PROBLEM — I25.10 ATHEROSCLEROTIC HEART DISEASE OF NATIVE CORONARY ARTERY WITHOUT ANGINA PECTORIS: Chronic | Status: ACTIVE | Noted: 2018-07-30

## 2018-10-12 ENCOUNTER — OUTPATIENT (OUTPATIENT)
Dept: OUTPATIENT SERVICES | Facility: HOSPITAL | Age: 68
LOS: 1 days | Discharge: HOME | End: 2018-10-12

## 2018-10-12 DIAGNOSIS — Z95.1 PRESENCE OF AORTOCORONARY BYPASS GRAFT: Chronic | ICD-10-CM

## 2018-10-12 DIAGNOSIS — M86.20 SUBACUTE OSTEOMYELITIS, UNSPECIFIED SITE: ICD-10-CM

## 2019-02-06 NOTE — PHYSICAL THERAPY INITIAL EVALUATION ADULT - STANDING BALANCE: STATIC
Discharged to home. Hep lock removed. No bleeding noted. VSS. Discharge instructions & paperwork discussed & given to pts mother. Pt & pts mother verbalized understanding. Pt going with family. Stable to discharge. Left ER ambulatory, steady gait noted
Received in stretcher. Alert & responsive. Oriented x4. Mother at bedside. VSS.  No distress noted. Quietly resting. Awaiting CT head. Medicated with zofran as ordered for nausea. Will continue to monitor
fair plus

## 2020-11-11 NOTE — PRE-OP CHECKLIST - PATIENT SENT TO
operating room
operating room

## 2023-05-09 NOTE — PRE-ANESTHESIA EVALUATION ADULT - NSANTHSUBSTSD_GEN_ALL_CORE
No
No
Purse String (Simple) Text: Given the location of the defect and the characteristics of the surrounding skin a purse string closure was deemed most appropriate.  Undermining was performed circumfirentially around the surgical defect.  A purse string suture was then placed and tightened.

## 2023-07-03 ENCOUNTER — OUTPATIENT (OUTPATIENT)
Dept: OUTPATIENT SERVICES | Facility: HOSPITAL | Age: 73
LOS: 1 days | End: 2023-07-03
Payer: COMMERCIAL

## 2023-07-03 VITALS
WEIGHT: 164.91 LBS | HEIGHT: 70 IN | HEART RATE: 72 BPM | SYSTOLIC BLOOD PRESSURE: 141 MMHG | RESPIRATION RATE: 16 BRPM | TEMPERATURE: 98 F | OXYGEN SATURATION: 97 % | DIASTOLIC BLOOD PRESSURE: 68 MMHG

## 2023-07-03 DIAGNOSIS — E78.5 HYPERLIPIDEMIA, UNSPECIFIED: ICD-10-CM

## 2023-07-03 DIAGNOSIS — I25.10 ATHEROSCLEROTIC HEART DISEASE OF NATIVE CORONARY ARTERY WITHOUT ANGINA PECTORIS: ICD-10-CM

## 2023-07-03 DIAGNOSIS — I10 ESSENTIAL (PRIMARY) HYPERTENSION: ICD-10-CM

## 2023-07-03 DIAGNOSIS — Z86.73 PERSONAL HISTORY OF TRANSIENT ISCHEMIC ATTACK (TIA), AND CEREBRAL INFARCTION WITHOUT RESIDUAL DEFICITS: ICD-10-CM

## 2023-07-03 DIAGNOSIS — E11.9 TYPE 2 DIABETES MELLITUS WITHOUT COMPLICATIONS: ICD-10-CM

## 2023-07-03 DIAGNOSIS — M65.351 TRIGGER FINGER, RIGHT LITTLE FINGER: ICD-10-CM

## 2023-07-03 DIAGNOSIS — I73.9 PERIPHERAL VASCULAR DISEASE, UNSPECIFIED: Chronic | ICD-10-CM

## 2023-07-03 DIAGNOSIS — M65.341 TRIGGER FINGER, RIGHT RING FINGER: ICD-10-CM

## 2023-07-03 DIAGNOSIS — Z95.5 PRESENCE OF CORONARY ANGIOPLASTY IMPLANT AND GRAFT: ICD-10-CM

## 2023-07-03 DIAGNOSIS — Z01.818 ENCOUNTER FOR OTHER PREPROCEDURAL EXAMINATION: ICD-10-CM

## 2023-07-03 DIAGNOSIS — M65.331 TRIGGER FINGER, RIGHT MIDDLE FINGER: ICD-10-CM

## 2023-07-03 DIAGNOSIS — Z98.890 OTHER SPECIFIED POSTPROCEDURAL STATES: Chronic | ICD-10-CM

## 2023-07-03 DIAGNOSIS — M46.20 OSTEOMYELITIS OF VERTEBRA, SITE UNSPECIFIED: ICD-10-CM

## 2023-07-03 DIAGNOSIS — Z95.1 PRESENCE OF AORTOCORONARY BYPASS GRAFT: Chronic | ICD-10-CM

## 2023-07-03 DIAGNOSIS — E78.5 HYPERLIPIDEMIA, UNSPECIFIED: Chronic | ICD-10-CM

## 2023-07-03 DIAGNOSIS — Z98.1 ARTHRODESIS STATUS: Chronic | ICD-10-CM

## 2023-07-03 DIAGNOSIS — M72.0 PALMAR FASCIAL FIBROMATOSIS [DUPUYTREN]: ICD-10-CM

## 2023-07-03 PROCEDURE — G0463: CPT

## 2023-07-03 RX ORDER — ATENOLOL AND CHLORTHALIDONE 50; 25 MG/1; MG/1
1 TABLET ORAL
Qty: 0 | Refills: 0 | DISCHARGE

## 2023-07-03 NOTE — H&P PST ADULT - NSICDXFAMILYHX_GEN_ALL_CORE_FT
FAMILY HISTORY:  Father  Still living? No  FHx: prostate cancer, Age at diagnosis: Age Unknown    Mother  Still living? No  Family history of heart disease, Age at diagnosis: Age Unknown  FH: hypertension, Age at diagnosis: Age Unknown

## 2023-07-03 NOTE — H&P PST ADULT - PROBLEM SELECTOR PLAN 8
Patient instructed do not take Metformin on day of surgery, patient will be NPO since midnight , pt verbalized understanding

## 2023-07-03 NOTE — H&P PST ADULT - NSICDXPASTSURGICALHX_GEN_ALL_CORE_FT
PAST SURGICAL HISTORY:  History of coronary artery bypass surgery     History of fusion of cervical spine     HLD (hyperlipidemia)     PVD (peripheral vascular disease)     S/P femoral-tibial bypass      PAST SURGICAL HISTORY:  History of coronary artery bypass surgery     History of fusion of cervical spine     S/P femoral-tibial bypass

## 2023-07-03 NOTE — H&P PST ADULT - ATTENDING COMMENTS
Planned surgery: 1) release of right middle finger, right ring finger, and right small finger trigger fingers and 2) release of right Dupuytren's contracture from palm to right ring finger and right small finger.    Risks discussed: I talked to the patient about potential complications including, but not limited to, not getting better, infection, hematoma/ bleeding, nerve/vessel injury, stiff joint, recurrence and scar pain, etc.    He understood and signed the consent.

## 2023-07-03 NOTE — H&P PST ADULT - PROBLEM SELECTOR PLAN 5
Continue cholesterol control medication Rosuvastatin . Continue DASH diet. Follow up with your primary doctor  after  discharge after surgery for further management and monitoring of lipid and cholesterol panels.

## 2023-07-03 NOTE — H&P PST ADULT - TEMPERATURE IN FAHRENHEIT (DEGREES F)
What Type Of Note Output Would You Prefer (Optional)?: Bullet Format How Severe Is Your Skin Lesion?: moderate Has Your Skin Lesion Been Treated?: not been treated Is This A New Presentation, Or A Follow-Up?: Skin Lesion 98.2

## 2023-07-03 NOTE — H&P PST ADULT - NSANTHOSAYNRD_GEN_A_CORE
No. TAMMI screening performed.  STOP BANG Legend: 0-2 = LOW Risk; 3-4 = INTERMEDIATE Risk; 5-8 = HIGH Risk

## 2023-07-03 NOTE — H&P PST ADULT - CRANIAL NERVE
Facial nerve right sided affected by stroke , cantus of right mouth dropped with lower lip, full opening of mouth Mallampati 1

## 2023-07-03 NOTE — H&P PST ADULT - ASSESSMENT
73 y/o male with pmh OM of cervical spine, CVA, Hypertension , high cholesterol , PVD with RLE bypass, CAD with stents and CABG on ASA and Plavix was diagnosed with trigger finger right little flinger , trigger finger right ring finger, trigger finger right middle finger and is scheduled  for right hand trigger release of 3rd, 4th, 5th digits with Dupuytren Contracture from palm to 4th and 5th digits on 7/7/2023. Patient is at intermediate risk for TAMMI due to high bp hx , age and male gender, not because limits in ROM of neck despite of stroke and OM of cervical vertebrae and cervical vertebral fusion.

## 2023-07-03 NOTE — H&P PST ADULT - PROBLEM SELECTOR PLAN 4
Procedure is  minor procedure, pt was seen by Cardiologist and has cardiology clearance for this procedure with taking ASA 81 mg everyday including morning of sx and stopping Plavix 7 days before surgery ( already stopped)

## 2023-07-03 NOTE — H&P PST ADULT - PROBLEM SELECTOR PLAN 3
Preoperative instructions discussed with pt and given to pt. Instructed pt to notify security when he arrives in the lobby of the hospital that he is here for surgery, that he will need someone to come to the hospital to pick him up after surgery, not to eat or drink anything after midnight the night before the surgery, to avoid NSAIDs such as Ibuprofen, Motrin, Aleve, Advil, naproxen before surgery, to take Tylenol if needed for pain,  Instructed about use of Chlorhexidine 4% soap for 3 days before surgery including the morning of the surgery to prevent infection. Verbalized understanding of instructions given.

## 2023-07-03 NOTE — H&P PST ADULT - PROBLEM SELECTOR PLAN 2
Preoperative instructions discussed with pt and given to pt. Instructed pt to notify security when he arrives in the lobby of the hospital that he is here for surgery, that he will need someone to come to the hospital to pick him up after surgery,not to eat or drink anything after midnight the night before the surgery, to avoid NSAIDs such as Ibuprofen, Motrin, Aleve, Advil, naproxen before surgery, to take Tylenol if needed for pain,  Instructed about use of Chlorhexidine 4% soap for 3 days before surgery including the morning of the surgery to prevent infection. Verbalized understanding of instructions given.

## 2023-07-03 NOTE — H&P PST ADULT - HISTORY OF PRESENT ILLNESS
71 y/o male with  73 y/o male with pmh OM of cervical spine, CVA, Hypertension , high cholesterol , PVD with RLE bypass, CAD with stents and CABG on ASA and Plavix presented to PST for evaluation before hand sx - residual after 9/2022 stroke . Pt was diagnosed with trigger finger right little flinger , trigger finger right ring finger, trigger finger right middle finger and is scheduled  for right hand trigger release of 3rd, 4th, 5th digits with Dupuytren Contracture from palm to 4th and 5th digits on 7/7/2023

## 2023-07-03 NOTE — H&P PST ADULT - PROBLEM SELECTOR PLAN 6
Instructed to continue antihypertensive meds and take with sips of water on day of surgery.  Instructed to follow-up as an outpatient with your primary care physician for further care and recommendations and high blood pressure management

## 2023-07-03 NOTE — H&P PST ADULT - EKG AND INTERPRETATION
sinus rhytm mild first degree AVB with MN 226ms bifascicular block, no ST- T changes , as per cardiology no significant changes to previous from 4/20/2023

## 2023-07-03 NOTE — H&P PST ADULT - PROBLEM SELECTOR PLAN 7
Despite OM of cervical vertebrae and fusion , pt has full mobility of neck Patient is at intermediate risk for TAMMI due to high bp hx , age and male gender, not because limits in ROM of neck and OM of cervical vertebrae and cervical vertebral fusion.

## 2023-07-03 NOTE — H&P PST ADULT - NSANTHAGERD_ENT_A_CORE
Problem: Skin Integrity:  Goal: Will show no infection signs and symptoms  Description: Will show no infection signs and symptoms  Outcome: Ongoing  Goal: Absence of new skin breakdown  Description: Absence of new skin breakdown  Outcome: Ongoing     Problem: Falls - Risk of:  Goal: Will remain free from falls  Description: Will remain free from falls  Outcome: Ongoing  Goal: Absence of physical injury  Description: Absence of physical injury  Outcome: Ongoing     Problem: Pain:  Goal: Pain level will decrease  Description: Pain level will decrease  Outcome: Ongoing  Goal: Control of acute pain  Description: Control of acute pain  Outcome: Ongoing  Goal: Control of chronic pain  Description: Control of chronic pain  Outcome: Ongoing     Problem: Discharge Planning:  Goal: Participates in care planning  Description: Participates in care planning  Outcome: Ongoing  Goal: Discharged to appropriate level of care  Description: Discharged to appropriate level of care  Outcome: Ongoing     Problem: Activity Intolerance:  Goal: Ability to tolerate increased activity will improve  Description: Ability to tolerate increased activity will improve  Outcome: Ongoing     Problem:  Bowel Function - Altered:  Goal: Bowel elimination is within specified parameters  Description: Bowel elimination is within specified parameters  Outcome: Ongoing     Problem: Fluid Volume - Deficit:  Goal: Absence of fluid volume deficit signs and symptoms  Description: Absence of fluid volume deficit signs and symptoms  Outcome: Ongoing  Goal: Electrolytes within specified parameters  Description: Electrolytes within specified parameters  Outcome: Ongoing     Problem: Mental Status - Impaired:  Goal: Absence of physical injury  Description: Absence of physical injury  Outcome: Ongoing  Goal: Absence of continued neurological deterioration signs and symptoms  Description: Absence of continued neurological deterioration signs and symptoms  Outcome: Ongoing  Goal: Mental status will be restored to baseline  Description: Mental status will be restored to baseline  Outcome: Ongoing     Problem: Mobility - Impaired:  Goal: Mobility will improve to maximum level  Description: Mobility will improve to maximum level  Outcome: Ongoing     Problem: Nutrition Deficit:  Goal: Ability to achieve adequate nutritional intake will improve  Description: Ability to achieve adequate nutritional intake will improve  Outcome: Ongoing     Problem: Pain:  Goal: Pain level will decrease  Description: Pain level will decrease  Outcome: Ongoing  Goal: Ability to notify healthcare provider of pain before it becomes unmanageable or unbearable will improve  Description: Ability to notify healthcare provider of pain before it becomes unmanageable or unbearable will improve  Outcome: Ongoing  Goal: Control of acute pain  Description: Control of acute pain  Outcome: Ongoing  Goal: Control of chronic pain  Description: Control of chronic pain  Outcome: Ongoing     Problem: Skin Integrity - Impaired:  Goal: Will show no infection signs and symptoms  Description: Will show no infection signs and symptoms  Outcome: Ongoing  Goal: Absence of new skin breakdown  Description: Absence of new skin breakdown  Outcome: Ongoing     Problem: Sleep Pattern Disturbance:  Goal: Appears well-rested  Description: Appears well-rested  Outcome: Ongoing Yes

## 2023-07-03 NOTE — H&P PST ADULT - PROBLEM SELECTOR PLAN 9
Despite having history of stroke with right side of body weakness / residue and right facial nerve involvement  ( dropping of cantus of mouth and lip ) patient can open mouth wide , Mallampati 1

## 2023-07-03 NOTE — H&P PST ADULT - NSICDXPROCEDURE_GEN_ALL_CORE_FT
PROCEDURES:  Release, trigger finger, 3 fingers 03-Jul-2023 18:34:11  Marium Casanova  Tendon sheath incision 03-Jul-2023 18:35:43  Marium Casanova

## 2023-07-03 NOTE — H&P PST ADULT - NSICDXPASTMEDICALHX_GEN_ALL_CORE_FT
PAST MEDICAL HISTORY:  BPH (benign prostatic hyperplasia)     Cerebrovascular accident (CVA) with involvement of right side of body     Coronary artery disease s/p CABG    Diabetes mellitus Diet-controlled; has taken metformin in the past as per patient    HTN (hypertension) On atenolol-chlorthalidone but doesnt take it    Stented coronary artery      PAST MEDICAL HISTORY:  BPH (benign prostatic hyperplasia)     Cerebrovascular accident (CVA) with involvement of right side of body     Coronary artery disease s/p CABG    HLD (hyperlipidemia)     HTN (hypertension)     PVD (peripheral vascular disease)     Stented coronary artery     Thrombocytopenia     Vertebral osteomyelitis

## 2023-07-07 ENCOUNTER — OUTPATIENT (OUTPATIENT)
Dept: OUTPATIENT SERVICES | Facility: HOSPITAL | Age: 73
LOS: 1 days | End: 2023-07-07
Payer: COMMERCIAL

## 2023-07-07 VITALS
SYSTOLIC BLOOD PRESSURE: 171 MMHG | DIASTOLIC BLOOD PRESSURE: 73 MMHG | HEART RATE: 72 BPM | HEIGHT: 70 IN | OXYGEN SATURATION: 96 % | WEIGHT: 164.91 LBS | RESPIRATION RATE: 16 BRPM | TEMPERATURE: 98 F

## 2023-07-07 VITALS
HEART RATE: 70 BPM | DIASTOLIC BLOOD PRESSURE: 72 MMHG | RESPIRATION RATE: 18 BRPM | TEMPERATURE: 98 F | SYSTOLIC BLOOD PRESSURE: 145 MMHG | OXYGEN SATURATION: 98 %

## 2023-07-07 DIAGNOSIS — M65.351 TRIGGER FINGER, RIGHT LITTLE FINGER: ICD-10-CM

## 2023-07-07 DIAGNOSIS — M72.0 PALMAR FASCIAL FIBROMATOSIS [DUPUYTREN]: ICD-10-CM

## 2023-07-07 DIAGNOSIS — M65.331 TRIGGER FINGER, RIGHT MIDDLE FINGER: ICD-10-CM

## 2023-07-07 DIAGNOSIS — Z98.890 OTHER SPECIFIED POSTPROCEDURAL STATES: Chronic | ICD-10-CM

## 2023-07-07 DIAGNOSIS — Z98.1 ARTHRODESIS STATUS: Chronic | ICD-10-CM

## 2023-07-07 DIAGNOSIS — M65.341 TRIGGER FINGER, RIGHT RING FINGER: ICD-10-CM

## 2023-07-07 DIAGNOSIS — Z95.1 PRESENCE OF AORTOCORONARY BYPASS GRAFT: Chronic | ICD-10-CM

## 2023-07-07 LAB
GLUCOSE BLDC GLUCOMTR-MCNC: 118 MG/DL — HIGH (ref 70–99)
GLUCOSE BLDC GLUCOMTR-MCNC: 155 MG/DL — HIGH (ref 70–99)

## 2023-07-07 PROCEDURE — 26055 INCISE FINGER TENDON SHEATH: CPT | Mod: F8

## 2023-07-07 PROCEDURE — 88305 TISSUE EXAM BY PATHOLOGIST: CPT

## 2023-07-07 PROCEDURE — 82962 GLUCOSE BLOOD TEST: CPT

## 2023-07-07 PROCEDURE — 88305 TISSUE EXAM BY PATHOLOGIST: CPT | Mod: 26

## 2023-07-07 PROCEDURE — 26125 RELEASE PALM CONTRACTURE: CPT | Mod: F9

## 2023-07-07 PROCEDURE — 26123 RELEASE PALM CONTRACTURE: CPT | Mod: RT

## 2023-07-07 RX ORDER — SODIUM CHLORIDE 9 MG/ML
1000 INJECTION, SOLUTION INTRAVENOUS
Refills: 0 | Status: DISCONTINUED | OUTPATIENT
Start: 2023-07-07 | End: 2023-07-07

## 2023-07-07 RX ORDER — ATENOLOL 25 MG/1
1 TABLET ORAL
Refills: 0 | DISCHARGE

## 2023-07-07 RX ORDER — SOD,AMMONIUM,POTASSIUM LACTATE
1 CREAM (GRAM) TOPICAL
Refills: 0 | DISCHARGE

## 2023-07-07 RX ORDER — PANTOPRAZOLE SODIUM 20 MG/1
1 TABLET, DELAYED RELEASE ORAL
Qty: 14 | Refills: 0
Start: 2023-07-07 | End: 2023-07-20

## 2023-07-07 RX ORDER — FENTANYL CITRATE 50 UG/ML
50 INJECTION INTRAVENOUS
Refills: 0 | Status: DISCONTINUED | OUTPATIENT
Start: 2023-07-07 | End: 2023-07-07

## 2023-07-07 RX ORDER — EZETIMIBE 10 MG/1
1 TABLET ORAL
Refills: 0 | DISCHARGE

## 2023-07-07 RX ORDER — IBUPROFEN 200 MG
1 TABLET ORAL
Qty: 56 | Refills: 0
Start: 2023-07-07 | End: 2023-07-20

## 2023-07-07 RX ORDER — TIMOLOL 0.5 %
1 DROPS OPHTHALMIC (EYE)
Refills: 0 | DISCHARGE

## 2023-07-07 RX ORDER — OXYCODONE HYDROCHLORIDE 5 MG/1
2 TABLET ORAL
Qty: 0 | Refills: 0 | DISCHARGE

## 2023-07-07 RX ORDER — METFORMIN HYDROCHLORIDE 850 MG/1
1 TABLET ORAL
Refills: 0 | DISCHARGE

## 2023-07-07 RX ORDER — ERGOCALCIFEROL 1.25 MG/1
1 CAPSULE ORAL
Refills: 0 | DISCHARGE

## 2023-07-07 RX ORDER — ASPIRIN/CALCIUM CARB/MAGNESIUM 324 MG
1 TABLET ORAL
Refills: 0 | DISCHARGE

## 2023-07-07 RX ORDER — ACETAMINOPHEN 500 MG
975 TABLET ORAL ONCE
Refills: 0 | Status: COMPLETED | OUTPATIENT
Start: 2023-07-07 | End: 2023-07-07

## 2023-07-07 RX ORDER — SODIUM CHLORIDE 9 MG/ML
3 INJECTION INTRAMUSCULAR; INTRAVENOUS; SUBCUTANEOUS EVERY 8 HOURS
Refills: 0 | Status: DISCONTINUED | OUTPATIENT
Start: 2023-07-07 | End: 2023-07-07

## 2023-07-07 RX ORDER — TAMSULOSIN HYDROCHLORIDE 0.4 MG/1
2 CAPSULE ORAL
Refills: 0 | DISCHARGE

## 2023-07-07 RX ORDER — ROSUVASTATIN CALCIUM 5 MG/1
1 TABLET ORAL
Refills: 0 | DISCHARGE

## 2023-07-07 RX ORDER — TRAMADOL HYDROCHLORIDE 50 MG/1
1 TABLET ORAL
Qty: 15 | Refills: 0
Start: 2023-07-07 | End: 2023-07-11

## 2023-07-07 RX ORDER — ONDANSETRON 8 MG/1
4 TABLET, FILM COATED ORAL ONCE
Refills: 0 | Status: DISCONTINUED | OUTPATIENT
Start: 2023-07-07 | End: 2023-07-07

## 2023-07-07 RX ORDER — CLOPIDOGREL BISULFATE 75 MG/1
1 TABLET, FILM COATED ORAL
Refills: 0 | DISCHARGE

## 2023-07-07 RX ORDER — FENTANYL CITRATE 50 UG/ML
25 INJECTION INTRAVENOUS
Refills: 0 | Status: DISCONTINUED | OUTPATIENT
Start: 2023-07-07 | End: 2023-07-07

## 2023-07-07 RX ADMIN — Medication 975 MILLIGRAM(S): at 07:33

## 2023-07-07 NOTE — BRIEF OPERATIVE NOTE - NSICDXBRIEFPROCEDURE_GEN_ALL_CORE_FT
PROCEDURES:  Release of contracture of metacarpophalangeal (MCP) joint of right hand 07-Jul-2023 09:18:11 3rd 4th 5th digit. Kenny Stephens   PROCEDURES:  Fasciectomy of finger 07-Jul-2023 11:33:25  Summer Medina  Fasciectomy of right palm 07-Jul-2023 11:35:34  Summer Medina  Trigger finger release 07-Jul-2023 11:35:47 Right 3rd, 4th and 5th finger Summer Medina

## 2023-07-07 NOTE — ASU PREOP CHECKLIST - ORDERS/MEDICATION ADMINISTRATION RECORD ON CHART
So I just put her on my schedule at my 10 am slot on 11/6 just in case you start insulin so we can titrate if needed.  Feel free to cancel if not needed. I did NOT inform her of this appt as I did it as an after thought just now but kept it at the same time as our one today in hopes it will work if needed. If you feel it is helpful and want to keep it then can you let her know about it when you talk to her next week? Thanks!  Kindra Kan, RD LD CDE  
done

## 2023-07-07 NOTE — ASU PATIENT PROFILE, ADULT - FALL HARM RISK - UNIVERSAL INTERVENTIONS
Bed in lowest position, wheels locked, appropriate side rails in place/Call bell, personal items and telephone in reach/Instruct patient to call for assistance before getting out of bed or chair/Non-slip footwear when patient is out of bed/[place] to call system/Physically safe environment - no spills, clutter or unnecessary equipment/Purposeful Proactive Rounding/Room/bathroom lighting operational, light cord in reach

## 2023-07-07 NOTE — ASU PATIENT PROFILE, ADULT - NSICDXPASTSURGICALHX_GEN_ALL_CORE_FT
PAST SURGICAL HISTORY:  History of coronary artery bypass surgery     History of fusion of cervical spine     S/P femoral-tibial bypass

## 2023-07-07 NOTE — ASU DISCHARGE PLAN (ADULT/PEDIATRIC) - CARE PROVIDER_API CALL
Summer Medina  Orthopaedic Surgery  [address] [place] [address]  Phone: [phone]  Fax: [phone]  Follow Up Time: 1 week

## 2023-07-07 NOTE — BRIEF OPERATIVE NOTE - NSICDXBRIEFPOSTOP_GEN_ALL_CORE_FT
POST-OP DIAGNOSIS:  Dupuytren's contracture of right hand 07-Jul-2023 09:18:37  Kenny Stephens   POST-OP DIAGNOSIS:  Trigger finger 07-Jul-2023 11:37:20 Right 3rd, 4th and 5th Summer Medina

## 2023-07-07 NOTE — ASU DISCHARGE PLAN (ADULT/PEDIATRIC) - RETURN TO WORK/SCHOOL
No... Localized Dermabrasion With Wire Brush Text: The patient was draped in routine manner.  Localized dermabrasion using 3 x 17 mm wire brush was performed in routine manner to papillary dermis. This spot dermabrasion is being performed to complete skin cancer reconstruction. It also will eliminate the other sun damaged precancerous cells that are known to be part of the regional effect of a lifetime's worth of sun exposure. This localized dermabrasion is therapeutic and should not be considered cosmetic in any regard. Localized Dermabrasion Text: The patient was draped in routine manner.  Localized dermabrasion using 3 x 17 mm wire brush was performed in routine manner to papillary dermis. This spot dermabrasion is being performed to complete skin cancer reconstruction. It also will eliminate the other sun damaged precancerous cells that are known to be part of the regional effect of a lifetime's worth of sun exposure. This localized dermabrasion is therapeutic and should not be considered cosmetic in any regard.

## 2023-07-07 NOTE — ASU PATIENT PROFILE, ADULT - NSICDXPASTMEDICALHX_GEN_ALL_CORE_FT
PAST MEDICAL HISTORY:  BPH (benign prostatic hyperplasia)     Cerebrovascular accident (CVA) with involvement of right side of body     Coronary artery disease s/p CABG    HLD (hyperlipidemia)     HTN (hypertension)     PVD (peripheral vascular disease)     Stented coronary artery     Thrombocytopenia     Vertebral osteomyelitis

## 2023-07-07 NOTE — BRIEF OPERATIVE NOTE - NSICDXBRIEFPREOP_GEN_ALL_CORE_FT
PRE-OP DIAGNOSIS:  Dupuytren's contracture of right hand 07-Jul-2023 09:18:32  Kenny Stephens   PRE-OP DIAGNOSIS:  Trigger finger 07-Jul-2023 11:36:31 Right 3rd, 4th and 5th Summer Medina

## 2023-07-07 NOTE — ASU DISCHARGE PLAN (ADULT/PEDIATRIC) - NS MD DC FALL RISK RISK
For information on Fall & Injury Prevention, visit: [email or web address] OR  [email or web address] OR  https://www.cdc.gov/steadi/patient.html

## 2023-07-07 NOTE — ASU PATIENT PROFILE, ADULT - REASON FOR ADMISSION, PROFILE
right hand trigger release of 3rd, 4th, 5th digits with Dupuytren Contracture from palm to 4th and 5th digits

## 2023-07-07 NOTE — ASU DISCHARGE PLAN (ADULT/PEDIATRIC) - ACTIVITY LEVEL
No exercise/No heavy lifting/No sports/gym/No weight bearing/Quiet play/Elevate extremity/No tub baths

## 2023-07-07 NOTE — ASU DISCHARGE PLAN (ADULT/PEDIATRIC) - ASU DC SPECIAL INSTRUCTIONSFT
keep dressing and splint clean and dry until seen byDR. An keep dressing and splint clean and dry until seen by DR. Medina

## 2023-07-12 LAB — SURGICAL PATHOLOGY STUDY: SIGNIFICANT CHANGE UP

## 2023-10-19 NOTE — ED PROVIDER NOTE - CHIEF COMPLAINT
Concern for delay in diagnosis and treatment The patient is a 67y Male complaining of back pain/injury.

## 2024-04-23 NOTE — PATIENT PROFILE ADULT. - NS TRANSFER RESPONSE BELONGING
[FreeTextEntry1] : PLAN  Urine culture ordered to assess for UTI. Discussed the used for Uribel but patient woudl like to see if urine culture is positive or not. WIll call if positive GC/CT- obtained, will call if abnormal  OCP refill given Discussed R/B/A of birth control Warning signs discussed  RTO 6 months for refill yes

## 2024-06-24 NOTE — PRE-OP CHECKLIST - TEMPERATURE IN CELSIUS (DEGREES C)
Requesting   Requested Prescriptions     Pending Prescriptions Disp Refills    Tirzepatide-Weight Management (ZEPBOUND) 5 MG/0.5ML Subcutaneous Solution Auto-injector 2 mL 0     Sig: Inject 5 mg into the skin once a week for 4 doses.       LOV: 04/23/2024  RTC: in about 4 months  Filled: 04/23/2024 #2ml with 1 refills    Future Appointments   Date Time Provider Department Center   9/16/2024 12:40 PM Diana Powers APRN [facility] [address]   11/4/2024  8:00 AM Apple Edgar APRN [facility]     262lb, I just got back from a vacation so maybe some of that will come off quickly. No problems to mention. Thank you!    36.4

## 2024-08-12 NOTE — ASU PATIENT PROFILE, ADULT - TEACHING/LEARNING FACTORS IMPACT ABILITY TO LEARN
MEDICAL ELIGIBILITY FORM    Name: Mary Sue YOB: 2006     Mary is Medically eligible for all sports without restriction    Recommendations: N/A    I have examined the student named on this form and completed the preparticipation physical evaluation. The athlete does not have apparent clinical contraindications to practice and can participate in the sport(s) as outlined on this form. A copy of the physical examination findings are on record in my office and can be made available to the school at the request of the parents. If conditions arise after the athlete has been cleared for participation, the physician may rescind the medical eligibility until the problem is resolved and the potential consequences are completely explained to the athlete (and parents or guardians).    Name of health care professional (print or type): Cherry Herr MD Date: 8/12/2024     Address: [address] [address]  Dept: [phone]     Signature of health care professional: _______________________________________      SHARED EMERGENCY INFORMATION    Allergies   Allergen Reactions   • Sulfa Antibiotics RASH       Current Outpatient Medications   Medication Instructions   • albuterol (ProAir RespiClick) 108 (90 Base) MCG/ACT inhaler 2 puffs, Inhalation, EVERY 4 HOURS PRN   • fluticasone (FLONASE) 50 MCG/ACT nasal spray USE 1 SPRAY IN EACH NOSTRIL ONCE TO TWICE DAILY   • guanFACINE (INTUNIV) 3 mg, Oral, DAILY   • lisdexamfetamine (VYVANSE) 30 mg, Oral, EVERY MORNING, (THIRTY PILLS ONLY)       Other information:_____________________________________________________________________  _____________________________________________________________________________________  _____________________________________________________________________________________    Emergency  contacts:___________________________________________________________________  _____________________________________________________________________________________  _____________________________________________________________________________________     © 2019 [place] [employer], American Academy of Pediatrics, American College of Sport Medicine, American Medical Society for Sports Medicine, American Orthopaedics Society for Sport Medicine, and American Osteopathic Academy of Sports Medicine.  Permission is granted to reprint for noncommercial, educational purposes with acknowledgement.        PHYSICAL EXAMINATION FORM     Name: Mary Sue YOB: 2006   PHYSICIAN REMINDERS  1. Consider additional questions on more-sensitive issues.  Do you feel stressed out or under a lot of pressure?  Do you feel sad, hopeless, depressed or anxious?  Do you feel safe at your home or residence?  During the past 30 days, did you use chewing tobacco, snuff or dip?  Do you drink alcohol or user any other drugs?  Have you even taken anabolic steroids or used any other performance-enhancing supplement?  Have you even take any supplements to help you gain or lose weight or improve your performance?  Do you wear a seat belt, use a helmet, and use condoms?  2.  Consider reviewing questions on cardiovascular symptoms (Q4-Q13 of History Form).    EXAMINATION     Vitals: /74   Pulse (!) 114   Temp 97.9 °F (36.6 °C)   Resp 18   Ht 5' 9\" (1.753 m)   Wt 69.5 kg (153 lb 3.2 oz)   BMI 22.62 kg/m²   BSA 1.84 m²    Vision: R 20/               L 20/                      Corrected  Y         N     MEDICAL NORMAL ABNORMAL FINDINGS   Appearance  Marfan stigmata (kyphoscoliosis, high-arched palate, pectus excavatum, arachnodactyly, arm span > height, hyperlaxity, myopia, MVP, aortic insufficiency) Yes    Eyes, ears, nose, throat  Pupils equal  Hearing Yes    Lymph nodes Yes    Heart*  Murmurs (auscultation  standing, auscultation supine, +/- Valsalva maneuver) Yes    Lungs Yes    Abdomen Yes    Skin  Herpes simplex virus (HSV), lesions suggestive of methicillin-resistant Staphylococcus aureus MRSA, or tinea corporis Yes    MUSCULOSKELETAL NORMAL ABNORMAL FINDINGS   Neck Yes    Back Yes    Shoulder and arm Yes    Elbow and forearm Yes    Wrist, hand, and fingers Yes    Hip and thigh Yes    Knee Yes    Leg and ankle Yes    Foot and toes Yes    Functional  Double-leg squat test, single-leg squat test, and box drop or step drop test Yes    * Consider electrocardiography ECG, echocardiogram, referral to cardiology for abnormal cardiac history or examination finding, or a combination of those.  Name of health care professional (print or type): Cherry Herr MD  Date: 8/12/2024  Address: [place] [address] [address]  [facility] [address] [facility] [address]  Dept: [phone]   Signature of health care professional: _______________________________________    © 2019 American Academy of Family Physicians, American Academy of Pediatrics, American College of Sport Medicine, American Medical Society for Sports Medicine, American Orthopaedics Society for Sport Medicine, and American Osteopathic Academy of Sports Medicine.  Permission is granted to reprint for noncommercial, educational purposes with acknowledgement.         HISTORY FORM  Note: Complete and sign this form (with your parents if younger than 18) before your appointment.  Name: Mary Sue YOB: 2006     Date of examination: 8/12/2024  Sport(s):_________________________________________   Sex assigned at birth: (F, M, or other): male How do you identify your gender?(F, M, or other):_________     List past and current medical conditions:____________________________________________________________________  _______________________________________________________________________________________________________________    Have you ever had  surgery? If yes, list all past surgical procedures: ______________________________________________  _______________________________________________________________________________________________________________    Medicines and supplements: List all current prescriptions, over-the-counter medicines, and supplements (herbal and nutritional):   ______________________________________________________________________________________________________________  ______________________________________________________________________________________________________________    Do you have any allergies? If yes, please list all your allergies (ie, medicines, pollens, food, stinging insects).   ______________________________________________________________________________________________________________  ______________________________________________________________________________________________________________       Patient Health Questionnaire Version 4 (PHQ-4)  Over the last 2 weeks, how often have you been bothered by any of the following problems? ([place] response.)   Not at all Several days Over half the days Nearly every day   Feeling nervous, anxious, or on edge 0 1 2 3   Not being able to stop or control worrying 0 1 2 3   Little interest or pleasure in doing things 0 1 2 3   Feeling down, depressed, or hopeless 0 1 2 3   (A sum of =3 is considered positive on either subscale [questions 1 and 2, or questions 3 and 4] for screening purposes.)     GENERAL QUESTIONS  (Explain “Yes” answers at the end of this form.  [place] questions if you don’t know the answer.)     Yes     No   1. Do you have any concerns that you would like to discuss with your provider?       2. Has a provider ever denied or restricted your participation in sports for any reason?       3. Do you have any ongoing medical issues or recent illness?       HEART HEALTH QUESTIONS ABOUT YOU Yes No   4. Have you ever passed out or nearly passed out during or  after exercise?       5. Have you ever had discomfort, pain, tightness, or pressure in your chest during exercise?       6. Does your heart ever race, flutter in your chest, or skip beats (irregular beats) during exercise?       7. Has a doctor ever told you that you have any heart problems?       8. Has a doctor ever requested a test for your heart? For example, electrocardiography (ECG) or echocardiography.      HEART HEALTH QUESTIONS ABOUT YOU (CONTINUED ) Yes No   9. Do you get light-headed or feel shorter of breath than your friends during exercise?       10. Have you ever had a seizure?       HEART HEALTH QUESTIONS ABOUT YOUR FAMILY Yes No   11. Has any family member or relative  of heart problems or had an unexpected or unexplained sudden death before age 35 years (including drowning or unexplained car crash)?       12. Does anyone in your family have a genetic heart problem such as hypertrophic cardiomyopathy (HCM), Marfan syndrome, arrhythmogenic right ventricular cardiomyopathy (ARVC), long QT syndrome (LQTS), short QT syndrome (SQTS), Brugada syndrome, or catecholaminergic polymorphic ventricular tachycardia (CPVT)?       13. Has anyone in your family had a pacemaker or an implanted defibrillator before age 35?                Name: Mary Sue YOB: 2006     BONE AND JOINT QUESTIONS Yes No   14. Have you ever had a stress fracture or an injury to a bone, muscle, ligament, joint, or tendon that caused you to miss a practice or game?       15. Do you have a bone, muscle, ligament, or joint injury that bothers you?       MEDICAL QUESTIONS Yes No   16. Do you cough, wheeze, or have difficulty breathing during or after exercise?       17. Are you missing a kidney, an eye, a testicle (males), your spleen, or any other organ?       18. Do you have groin or testicle pain or a painful bulge or hernia in the groin area?       19. Do you have any recurring skin rashes or rashes that come and go,  including herpes or methicillin-resistant Staphylococcus aureus  (MRSA)?       20. Have you had a concussion or head injury that caused confusion, a prolonged headache, or memory problems?       21. Have you ever had numbness, had tingling, had weakness in your arms or legs, or been unable to move your arms or legs after being hit or falling?       22. Have you ever become ill while exercising in the heat?       23. Do you or does someone in your family have sickle cell trait or disease?       24. Have you ever had or do you have any problems with your eyes or vision?        MEDICAL QUESTIONS (CONTINUED ) Yes No   25. Do you worry about your weight?         26. Are you trying to or has anyone recommended that you gain or lose weight?       27. Are you on a special diet or do you avoid certain types of foods or food groups?       28. Have you ever had an eating disorder?       FEMALES ONLY     29. Have you ever had a menstrual period?       30. How old were you when you had your first menstrual period?       31. When was your most recent menstrual period?       32. How many periods have you had in the past 12 months?         Explain \"Yes\" answers here.  ______________________________________________    ______________________________________________    ______________________________________________    ______________________________________________    ______________________________________________    ______________________________________________    ______________________________________________    ______________________________________________     I hereby state that, to the best of my knowledge, my answers to the questions on this form are complete and correct.    Signature of athlete: ____________________________________________________________________________________    Signature of parent or guardian: ___________________________________________________________________________  Date:  ________________________________________________  _____________________________________________________________________________________________________  © 2019 American Academy [employer] Physicians, American Academy of Pediatrics, American College of Sports Medicine, American Medical Society for Sports Medicine, American Orthopaedic Society for Sports Medicine, and American Osteopathic Academy of Sports Medicine. Permission is granted to reprint for noncommercial, educational purposes with acknowledgment.   language

## 2024-11-15 NOTE — ASU PREOP CHECKLIST - HEART RATE (BEATS/MIN)
Monitor BP daily and keep BP daily log to bring to next visit. Exercise at least 5 times a week. Keep scheduled appts. RTC sooner if BP is staying <120/80. Dash diet.    DASH- includes foods rich in K+, calcium and Magnesium.The diet limits foods high in sodium, saturated fats and added sugars. This is a flexible balanced eating plan that helps create heart healthy eating style for life. Diet is rich in vegetable, whole grains and fruits. It includes fat free or low fat dairy products, fish, poultry, nuts. Chose foods high in K+, calcium, magnesium, fiber, protein, and low in saturated fats and sodium.    72

## 2025-01-01 ENCOUNTER — INPATIENT (INPATIENT)
Facility: HOSPITAL | Age: 75
LOS: 8 days | DRG: 871 | End: 2025-08-08
Attending: STUDENT IN AN ORGANIZED HEALTH CARE EDUCATION/TRAINING PROGRAM | Admitting: STUDENT IN AN ORGANIZED HEALTH CARE EDUCATION/TRAINING PROGRAM
Payer: COMMERCIAL

## 2025-01-01 ENCOUNTER — RESULT REVIEW (OUTPATIENT)
Age: 75
End: 2025-01-01

## 2025-01-01 VITALS — HEART RATE: 130 BPM | WEIGHT: 151.9 LBS | RESPIRATION RATE: 23 BRPM | TEMPERATURE: 104 F | OXYGEN SATURATION: 99 %

## 2025-01-01 VITALS
SYSTOLIC BLOOD PRESSURE: 72 MMHG | DIASTOLIC BLOOD PRESSURE: 52 MMHG | RESPIRATION RATE: 23 BRPM | HEART RATE: 69 BPM | OXYGEN SATURATION: 93 % | TEMPERATURE: 103 F

## 2025-01-01 DIAGNOSIS — D69.6 THROMBOCYTOPENIA, UNSPECIFIED: ICD-10-CM

## 2025-01-01 DIAGNOSIS — G93.49 OTHER ENCEPHALOPATHY: ICD-10-CM

## 2025-01-01 DIAGNOSIS — A41.9 SEPSIS, UNSPECIFIED ORGANISM: ICD-10-CM

## 2025-01-01 DIAGNOSIS — Z98.890 OTHER SPECIFIED POSTPROCEDURAL STATES: Chronic | ICD-10-CM

## 2025-01-01 DIAGNOSIS — Z29.9 ENCOUNTER FOR PROPHYLACTIC MEASURES, UNSPECIFIED: ICD-10-CM

## 2025-01-01 DIAGNOSIS — E11.9 TYPE 2 DIABETES MELLITUS WITHOUT COMPLICATIONS: ICD-10-CM

## 2025-01-01 DIAGNOSIS — N17.9 ACUTE KIDNEY FAILURE, UNSPECIFIED: ICD-10-CM

## 2025-01-01 DIAGNOSIS — I63.9 CEREBRAL INFARCTION, UNSPECIFIED: ICD-10-CM

## 2025-01-01 DIAGNOSIS — R56.9 UNSPECIFIED CONVULSIONS: ICD-10-CM

## 2025-01-01 DIAGNOSIS — E78.5 HYPERLIPIDEMIA, UNSPECIFIED: ICD-10-CM

## 2025-01-01 DIAGNOSIS — Z51.5 ENCOUNTER FOR PALLIATIVE CARE: ICD-10-CM

## 2025-01-01 DIAGNOSIS — I10 ESSENTIAL (PRIMARY) HYPERTENSION: ICD-10-CM

## 2025-01-01 DIAGNOSIS — I48.91 UNSPECIFIED ATRIAL FIBRILLATION: ICD-10-CM

## 2025-01-01 DIAGNOSIS — Z95.1 PRESENCE OF AORTOCORONARY BYPASS GRAFT: Chronic | ICD-10-CM

## 2025-01-01 DIAGNOSIS — Z98.1 ARTHRODESIS STATUS: Chronic | ICD-10-CM

## 2025-01-01 DIAGNOSIS — I25.10 ATHEROSCLEROTIC HEART DISEASE OF NATIVE CORONARY ARTERY WITHOUT ANGINA PECTORIS: ICD-10-CM

## 2025-01-01 LAB
-  AMOXICILLIN/CLAVULANIC ACID: SIGNIFICANT CHANGE UP
-  AMPICILLIN/SULBACTAM: SIGNIFICANT CHANGE UP
-  AMPICILLIN/SULBACTAM: SIGNIFICANT CHANGE UP
-  AMPICILLIN: SIGNIFICANT CHANGE UP
-  AMPICILLIN: SIGNIFICANT CHANGE UP
-  AZTREONAM: SIGNIFICANT CHANGE UP
-  AZTREONAM: SIGNIFICANT CHANGE UP
-  CEFAZOLIN: SIGNIFICANT CHANGE UP
-  CEFAZOLIN: SIGNIFICANT CHANGE UP
-  CEFEPIME: SIGNIFICANT CHANGE UP
-  CEFEPIME: SIGNIFICANT CHANGE UP
-  CEFOXITIN: SIGNIFICANT CHANGE UP
-  CEFOXITIN: SIGNIFICANT CHANGE UP
-  CEFTRIAXONE: SIGNIFICANT CHANGE UP
-  CEFTRIAXONE: SIGNIFICANT CHANGE UP
-  CEFUROXIME: SIGNIFICANT CHANGE UP
-  CIPROFLOXACIN: SIGNIFICANT CHANGE UP
-  CIPROFLOXACIN: SIGNIFICANT CHANGE UP
-  ERTAPENEM: SIGNIFICANT CHANGE UP
-  ERTAPENEM: SIGNIFICANT CHANGE UP
-  GENTAMICIN: SIGNIFICANT CHANGE UP
-  GENTAMICIN: SIGNIFICANT CHANGE UP
-  IMIPENEM: SIGNIFICANT CHANGE UP
-  IMIPENEM: SIGNIFICANT CHANGE UP
-  LEVOFLOXACIN: SIGNIFICANT CHANGE UP
-  LEVOFLOXACIN: SIGNIFICANT CHANGE UP
-  MEROPENEM: SIGNIFICANT CHANGE UP
-  MEROPENEM: SIGNIFICANT CHANGE UP
-  NITROFURANTOIN: SIGNIFICANT CHANGE UP
-  PIPERACILLIN/TAZOBACTAM: SIGNIFICANT CHANGE UP
-  PIPERACILLIN/TAZOBACTAM: SIGNIFICANT CHANGE UP
-  TIGECYCLINE: SIGNIFICANT CHANGE UP
-  TIGECYCLINE: SIGNIFICANT CHANGE UP
-  TOBRAMYCIN: SIGNIFICANT CHANGE UP
-  TOBRAMYCIN: SIGNIFICANT CHANGE UP
-  TRIMETHOPRIM/SULFAMETHOXAZOLE: SIGNIFICANT CHANGE UP
-  TRIMETHOPRIM/SULFAMETHOXAZOLE: SIGNIFICANT CHANGE UP
A1C WITH ESTIMATED AVERAGE GLUCOSE RESULT: 8.8 % — HIGH (ref 4–5.6)
ACETONE SERPL-MCNC: NEGATIVE — SIGNIFICANT CHANGE UP
ALBUMIN SERPL ELPH-MCNC: 1.8 G/DL — LOW (ref 3.5–5)
ALBUMIN SERPL ELPH-MCNC: 1.8 G/DL — LOW (ref 3.5–5)
ALBUMIN SERPL ELPH-MCNC: 1.9 G/DL — LOW (ref 3.5–5)
ALBUMIN SERPL ELPH-MCNC: 1.9 G/DL — LOW (ref 3.5–5)
ALBUMIN SERPL ELPH-MCNC: 2 G/DL — LOW (ref 3.5–5)
ALBUMIN SERPL ELPH-MCNC: 2.1 G/DL — LOW (ref 3.5–5)
ALBUMIN SERPL ELPH-MCNC: 2.1 G/DL — LOW (ref 3.5–5)
ALP SERPL-CCNC: 104 U/L — SIGNIFICANT CHANGE UP (ref 40–120)
ALP SERPL-CCNC: 132 U/L — HIGH (ref 40–120)
ALP SERPL-CCNC: 63 U/L — SIGNIFICANT CHANGE UP (ref 40–120)
ALP SERPL-CCNC: 68 U/L — SIGNIFICANT CHANGE UP (ref 40–120)
ALP SERPL-CCNC: 70 U/L — SIGNIFICANT CHANGE UP (ref 40–120)
ALP SERPL-CCNC: 75 U/L — SIGNIFICANT CHANGE UP (ref 40–120)
ALP SERPL-CCNC: 77 U/L — SIGNIFICANT CHANGE UP (ref 40–120)
ALP SERPL-CCNC: 78 U/L — SIGNIFICANT CHANGE UP (ref 40–120)
ALP SERPL-CCNC: 83 U/L — SIGNIFICANT CHANGE UP (ref 40–120)
ALT FLD-CCNC: 25 U/L DA — SIGNIFICANT CHANGE UP (ref 10–60)
ALT FLD-CCNC: 27 U/L DA — SIGNIFICANT CHANGE UP (ref 10–60)
ALT FLD-CCNC: 29 U/L DA — SIGNIFICANT CHANGE UP (ref 10–60)
ALT FLD-CCNC: 34 U/L DA — SIGNIFICANT CHANGE UP (ref 10–60)
ALT FLD-CCNC: 35 U/L DA — SIGNIFICANT CHANGE UP (ref 10–60)
ALT FLD-CCNC: 37 U/L DA — SIGNIFICANT CHANGE UP (ref 10–60)
ALT FLD-CCNC: 38 U/L DA — SIGNIFICANT CHANGE UP (ref 10–60)
ALT FLD-CCNC: 38 U/L DA — SIGNIFICANT CHANGE UP (ref 10–60)
ALT FLD-CCNC: 66 U/L DA — HIGH (ref 10–60)
AMPHET UR-MCNC: NEGATIVE — SIGNIFICANT CHANGE UP
AMPHET UR-MCNC: NEGATIVE — SIGNIFICANT CHANGE UP
ANION GAP SERPL CALC-SCNC: 10 MMOL/L — SIGNIFICANT CHANGE UP (ref 5–17)
ANION GAP SERPL CALC-SCNC: 13 MMOL/L — SIGNIFICANT CHANGE UP (ref 5–17)
ANION GAP SERPL CALC-SCNC: 2 MMOL/L — LOW (ref 5–17)
ANION GAP SERPL CALC-SCNC: 4 MMOL/L — LOW (ref 5–17)
ANION GAP SERPL CALC-SCNC: 4 MMOL/L — LOW (ref 5–17)
ANION GAP SERPL CALC-SCNC: 5 MMOL/L — SIGNIFICANT CHANGE UP (ref 5–17)
ANION GAP SERPL CALC-SCNC: 6 MMOL/L — SIGNIFICANT CHANGE UP (ref 5–17)
ANION GAP SERPL CALC-SCNC: 6 MMOL/L — SIGNIFICANT CHANGE UP (ref 5–17)
ANION GAP SERPL CALC-SCNC: 7 MMOL/L — SIGNIFICANT CHANGE UP (ref 5–17)
ANION GAP SERPL CALC-SCNC: 9 MMOL/L — SIGNIFICANT CHANGE UP (ref 5–17)
APPEARANCE UR: ABNORMAL
APTT BLD: 23.8 SEC — LOW (ref 26.1–36.8)
AST SERPL-CCNC: 114 U/L — HIGH (ref 10–40)
AST SERPL-CCNC: 28 U/L — SIGNIFICANT CHANGE UP (ref 10–40)
AST SERPL-CCNC: 37 U/L — SIGNIFICANT CHANGE UP (ref 10–40)
AST SERPL-CCNC: 41 U/L — HIGH (ref 10–40)
AST SERPL-CCNC: 44 U/L — HIGH (ref 10–40)
AST SERPL-CCNC: 50 U/L — HIGH (ref 10–40)
AST SERPL-CCNC: 60 U/L — HIGH (ref 10–40)
AST SERPL-CCNC: 64 U/L — HIGH (ref 10–40)
AST SERPL-CCNC: 76 U/L — HIGH (ref 10–40)
B-OH-BUTYR SERPL-SCNC: 0.1 MMOL/L — SIGNIFICANT CHANGE UP
BACTERIA # UR AUTO: ABNORMAL /HPF
BARBITURATES UR SCN-MCNC: NEGATIVE — SIGNIFICANT CHANGE UP
BARBITURATES UR SCN-MCNC: NEGATIVE — SIGNIFICANT CHANGE UP
BASE EXCESS BLDA CALC-SCNC: -6.7 MMOL/L — LOW (ref -2–3)
BASOPHILS # BLD AUTO: 0 K/UL — SIGNIFICANT CHANGE UP (ref 0–0.2)
BASOPHILS # BLD AUTO: 0.01 K/UL — SIGNIFICANT CHANGE UP (ref 0–0.2)
BASOPHILS # BLD AUTO: 0.02 K/UL — SIGNIFICANT CHANGE UP (ref 0–0.2)
BASOPHILS # BLD AUTO: 0.03 K/UL — SIGNIFICANT CHANGE UP (ref 0–0.2)
BASOPHILS NFR BLD AUTO: 0 % — SIGNIFICANT CHANGE UP (ref 0–2)
BASOPHILS NFR BLD AUTO: 0.1 % — SIGNIFICANT CHANGE UP (ref 0–2)
BASOPHILS NFR BLD AUTO: 0.2 % — SIGNIFICANT CHANGE UP (ref 0–2)
BASOPHILS NFR BLD AUTO: 0.3 % — SIGNIFICANT CHANGE UP (ref 0–2)
BENZODIAZ UR-MCNC: NEGATIVE — SIGNIFICANT CHANGE UP
BENZODIAZ UR-MCNC: NEGATIVE — SIGNIFICANT CHANGE UP
BILIRUB SERPL-MCNC: 0.6 MG/DL — SIGNIFICANT CHANGE UP (ref 0.2–1.2)
BILIRUB SERPL-MCNC: 0.6 MG/DL — SIGNIFICANT CHANGE UP (ref 0.2–1.2)
BILIRUB SERPL-MCNC: 0.7 MG/DL — SIGNIFICANT CHANGE UP (ref 0.2–1.2)
BILIRUB SERPL-MCNC: 0.8 MG/DL — SIGNIFICANT CHANGE UP (ref 0.2–1.2)
BILIRUB SERPL-MCNC: 0.8 MG/DL — SIGNIFICANT CHANGE UP (ref 0.2–1.2)
BILIRUB SERPL-MCNC: 0.9 MG/DL — SIGNIFICANT CHANGE UP (ref 0.2–1.2)
BILIRUB UR-MCNC: NEGATIVE — SIGNIFICANT CHANGE UP
BLOOD GAS COMMENTS ARTERIAL: SIGNIFICANT CHANGE UP
BUN SERPL-MCNC: 19 MG/DL — HIGH (ref 7–18)
BUN SERPL-MCNC: 21 MG/DL — HIGH (ref 7–18)
BUN SERPL-MCNC: 24 MG/DL — HIGH (ref 7–18)
BUN SERPL-MCNC: 24 MG/DL — HIGH (ref 7–18)
BUN SERPL-MCNC: 30 MG/DL — HIGH (ref 7–18)
BUN SERPL-MCNC: 39 MG/DL — HIGH (ref 7–18)
BUN SERPL-MCNC: 51 MG/DL — HIGH (ref 7–18)
BUN SERPL-MCNC: 60 MG/DL — HIGH (ref 7–18)
BUN SERPL-MCNC: 64 MG/DL — HIGH (ref 7–18)
BUN SERPL-MCNC: 66 MG/DL — HIGH (ref 7–18)
CALCIUM SERPL-MCNC: 8 MG/DL — LOW (ref 8.4–10.5)
CALCIUM SERPL-MCNC: 8 MG/DL — LOW (ref 8.4–10.5)
CALCIUM SERPL-MCNC: 8.1 MG/DL — LOW (ref 8.4–10.5)
CALCIUM SERPL-MCNC: 8.2 MG/DL — LOW (ref 8.4–10.5)
CALCIUM SERPL-MCNC: 8.3 MG/DL — LOW (ref 8.4–10.5)
CALCIUM SERPL-MCNC: 8.4 MG/DL — SIGNIFICANT CHANGE UP (ref 8.4–10.5)
CHLORIDE SERPL-SCNC: 100 MMOL/L — SIGNIFICANT CHANGE UP (ref 96–108)
CHLORIDE SERPL-SCNC: 104 MMOL/L — SIGNIFICANT CHANGE UP (ref 96–108)
CHLORIDE SERPL-SCNC: 107 MMOL/L — SIGNIFICANT CHANGE UP (ref 96–108)
CHLORIDE SERPL-SCNC: 108 MMOL/L — SIGNIFICANT CHANGE UP (ref 96–108)
CHLORIDE SERPL-SCNC: 109 MMOL/L — HIGH (ref 96–108)
CHLORIDE SERPL-SCNC: 110 MMOL/L — HIGH (ref 96–108)
CHLORIDE SERPL-SCNC: 111 MMOL/L — HIGH (ref 96–108)
CHLORIDE SERPL-SCNC: 112 MMOL/L — HIGH (ref 96–108)
CHLORIDE SERPL-SCNC: 121 MMOL/L — HIGH (ref 96–108)
CHLORIDE SERPL-SCNC: 123 MMOL/L — HIGH (ref 96–108)
CHOLEST SERPL-MCNC: 102 MG/DL — SIGNIFICANT CHANGE UP
CHOLEST SERPL-MCNC: 90 MG/DL — SIGNIFICANT CHANGE UP
CO2 SERPL-SCNC: 18 MMOL/L — LOW (ref 22–31)
CO2 SERPL-SCNC: 19 MMOL/L — LOW (ref 22–31)
CO2 SERPL-SCNC: 21 MMOL/L — LOW (ref 22–31)
CO2 SERPL-SCNC: 24 MMOL/L — SIGNIFICANT CHANGE UP (ref 22–31)
CO2 SERPL-SCNC: 25 MMOL/L — SIGNIFICANT CHANGE UP (ref 22–31)
CO2 SERPL-SCNC: 26 MMOL/L — SIGNIFICANT CHANGE UP (ref 22–31)
CO2 SERPL-SCNC: 27 MMOL/L — SIGNIFICANT CHANGE UP (ref 22–31)
CO2 SERPL-SCNC: 31 MMOL/L — SIGNIFICANT CHANGE UP (ref 22–31)
COCAINE METAB.OTHER UR-MCNC: NEGATIVE — SIGNIFICANT CHANGE UP
COCAINE METAB.OTHER UR-MCNC: NEGATIVE — SIGNIFICANT CHANGE UP
COLOR SPEC: YELLOW — SIGNIFICANT CHANGE UP
CREAT ?TM UR-MCNC: 44 MG/DL — SIGNIFICANT CHANGE UP
CREAT SERPL-MCNC: 0.93 MG/DL — SIGNIFICANT CHANGE UP (ref 0.5–1.3)
CREAT SERPL-MCNC: 1.1 MG/DL — SIGNIFICANT CHANGE UP (ref 0.5–1.3)
CREAT SERPL-MCNC: 1.15 MG/DL — SIGNIFICANT CHANGE UP (ref 0.5–1.3)
CREAT SERPL-MCNC: 1.2 MG/DL — SIGNIFICANT CHANGE UP (ref 0.5–1.3)
CREAT SERPL-MCNC: 1.23 MG/DL — SIGNIFICANT CHANGE UP (ref 0.5–1.3)
CREAT SERPL-MCNC: 1.25 MG/DL — SIGNIFICANT CHANGE UP (ref 0.5–1.3)
CREAT SERPL-MCNC: 1.31 MG/DL — HIGH (ref 0.5–1.3)
CREAT SERPL-MCNC: 1.69 MG/DL — HIGH (ref 0.5–1.3)
CREAT SERPL-MCNC: 1.8 MG/DL — HIGH (ref 0.5–1.3)
CREAT SERPL-MCNC: 2.21 MG/DL — HIGH (ref 0.5–1.3)
CULTURE RESULTS: ABNORMAL
CULTURE RESULTS: SIGNIFICANT CHANGE UP
DIFF PNL FLD: ABNORMAL
E COLI DNA BLD POS QL NAA+NON-PROBE: SIGNIFICANT CHANGE UP
EGFR: 31 ML/MIN/1.73M2 — LOW
EGFR: 31 ML/MIN/1.73M2 — LOW
EGFR: 40 ML/MIN/1.73M2 — LOW
EGFR: 40 ML/MIN/1.73M2 — LOW
EGFR: 43 ML/MIN/1.73M2 — LOW
EGFR: 43 ML/MIN/1.73M2 — LOW
EGFR: 58 ML/MIN/1.73M2 — LOW
EGFR: 58 ML/MIN/1.73M2 — LOW
EGFR: 61 ML/MIN/1.73M2 — SIGNIFICANT CHANGE UP
EGFR: 61 ML/MIN/1.73M2 — SIGNIFICANT CHANGE UP
EGFR: 62 ML/MIN/1.73M2 — SIGNIFICANT CHANGE UP
EGFR: 62 ML/MIN/1.73M2 — SIGNIFICANT CHANGE UP
EGFR: 64 ML/MIN/1.73M2 — SIGNIFICANT CHANGE UP
EGFR: 64 ML/MIN/1.73M2 — SIGNIFICANT CHANGE UP
EGFR: 68 ML/MIN/1.73M2 — SIGNIFICANT CHANGE UP
EGFR: 68 ML/MIN/1.73M2 — SIGNIFICANT CHANGE UP
EGFR: 71 ML/MIN/1.73M2 — SIGNIFICANT CHANGE UP
EGFR: 71 ML/MIN/1.73M2 — SIGNIFICANT CHANGE UP
EGFR: 87 ML/MIN/1.73M2 — SIGNIFICANT CHANGE UP
EGFR: 87 ML/MIN/1.73M2 — SIGNIFICANT CHANGE UP
EOSINOPHIL # BLD AUTO: 0 K/UL — SIGNIFICANT CHANGE UP (ref 0–0.5)
EOSINOPHIL # BLD AUTO: 0.01 K/UL — SIGNIFICANT CHANGE UP (ref 0–0.5)
EOSINOPHIL # BLD AUTO: 0.02 K/UL — SIGNIFICANT CHANGE UP (ref 0–0.5)
EOSINOPHIL NFR BLD AUTO: 0 % — SIGNIFICANT CHANGE UP (ref 0–6)
EOSINOPHIL NFR BLD AUTO: 0.1 % — SIGNIFICANT CHANGE UP (ref 0–6)
EOSINOPHIL NFR BLD AUTO: 0.2 % — SIGNIFICANT CHANGE UP (ref 0–6)
EPI CELLS # UR: PRESENT
ESTIMATED AVERAGE GLUCOSE: 206 MG/DL — HIGH (ref 68–114)
ETHANOL SERPL-MCNC: 9 MG/DL — SIGNIFICANT CHANGE UP (ref 0–10)
FLUAV AG NPH QL: SIGNIFICANT CHANGE UP
FLUBV AG NPH QL: SIGNIFICANT CHANGE UP
GAS PNL BLDA: SIGNIFICANT CHANGE UP
GLUCOSE BLDC GLUCOMTR-MCNC: 138 MG/DL — HIGH (ref 70–99)
GLUCOSE BLDC GLUCOMTR-MCNC: 142 MG/DL — HIGH (ref 70–99)
GLUCOSE BLDC GLUCOMTR-MCNC: 143 MG/DL — HIGH (ref 70–99)
GLUCOSE BLDC GLUCOMTR-MCNC: 163 MG/DL — HIGH (ref 70–99)
GLUCOSE BLDC GLUCOMTR-MCNC: 171 MG/DL — HIGH (ref 70–99)
GLUCOSE BLDC GLUCOMTR-MCNC: 198 MG/DL — HIGH (ref 70–99)
GLUCOSE BLDC GLUCOMTR-MCNC: 205 MG/DL — HIGH (ref 70–99)
GLUCOSE BLDC GLUCOMTR-MCNC: 206 MG/DL — HIGH (ref 70–99)
GLUCOSE BLDC GLUCOMTR-MCNC: 209 MG/DL — HIGH (ref 70–99)
GLUCOSE BLDC GLUCOMTR-MCNC: 243 MG/DL — HIGH (ref 70–99)
GLUCOSE BLDC GLUCOMTR-MCNC: 246 MG/DL — HIGH (ref 70–99)
GLUCOSE BLDC GLUCOMTR-MCNC: 249 MG/DL — HIGH (ref 70–99)
GLUCOSE BLDC GLUCOMTR-MCNC: 252 MG/DL — HIGH (ref 70–99)
GLUCOSE BLDC GLUCOMTR-MCNC: 257 MG/DL — HIGH (ref 70–99)
GLUCOSE BLDC GLUCOMTR-MCNC: 257 MG/DL — HIGH (ref 70–99)
GLUCOSE BLDC GLUCOMTR-MCNC: 264 MG/DL — HIGH (ref 70–99)
GLUCOSE BLDC GLUCOMTR-MCNC: 267 MG/DL — HIGH (ref 70–99)
GLUCOSE BLDC GLUCOMTR-MCNC: 268 MG/DL — HIGH (ref 70–99)
GLUCOSE BLDC GLUCOMTR-MCNC: 271 MG/DL — HIGH (ref 70–99)
GLUCOSE BLDC GLUCOMTR-MCNC: 272 MG/DL — HIGH (ref 70–99)
GLUCOSE BLDC GLUCOMTR-MCNC: 284 MG/DL — HIGH (ref 70–99)
GLUCOSE BLDC GLUCOMTR-MCNC: 284 MG/DL — HIGH (ref 70–99)
GLUCOSE BLDC GLUCOMTR-MCNC: 287 MG/DL — HIGH (ref 70–99)
GLUCOSE BLDC GLUCOMTR-MCNC: 289 MG/DL — HIGH (ref 70–99)
GLUCOSE BLDC GLUCOMTR-MCNC: 304 MG/DL — HIGH (ref 70–99)
GLUCOSE BLDC GLUCOMTR-MCNC: 316 MG/DL — HIGH (ref 70–99)
GLUCOSE BLDC GLUCOMTR-MCNC: 340 MG/DL — HIGH (ref 70–99)
GLUCOSE BLDC GLUCOMTR-MCNC: 341 MG/DL — HIGH (ref 70–99)
GLUCOSE BLDC GLUCOMTR-MCNC: 363 MG/DL — HIGH (ref 70–99)
GLUCOSE BLDC GLUCOMTR-MCNC: 368 MG/DL — HIGH (ref 70–99)
GLUCOSE BLDC GLUCOMTR-MCNC: 369 MG/DL — HIGH (ref 70–99)
GLUCOSE BLDC GLUCOMTR-MCNC: 383 MG/DL — HIGH (ref 70–99)
GLUCOSE BLDC GLUCOMTR-MCNC: 386 MG/DL — HIGH (ref 70–99)
GLUCOSE BLDC GLUCOMTR-MCNC: 392 MG/DL — HIGH (ref 70–99)
GLUCOSE BLDC GLUCOMTR-MCNC: 456 MG/DL — CRITICAL HIGH (ref 70–99)
GLUCOSE BLDC GLUCOMTR-MCNC: 475 MG/DL — CRITICAL HIGH (ref 70–99)
GLUCOSE SERPL-MCNC: 174 MG/DL — HIGH (ref 70–99)
GLUCOSE SERPL-MCNC: 188 MG/DL — HIGH (ref 70–99)
GLUCOSE SERPL-MCNC: 239 MG/DL — HIGH (ref 70–99)
GLUCOSE SERPL-MCNC: 296 MG/DL — HIGH (ref 70–99)
GLUCOSE SERPL-MCNC: 305 MG/DL — HIGH (ref 70–99)
GLUCOSE SERPL-MCNC: 315 MG/DL — HIGH (ref 70–99)
GLUCOSE SERPL-MCNC: 338 MG/DL — HIGH (ref 70–99)
GLUCOSE SERPL-MCNC: 348 MG/DL — HIGH (ref 70–99)
GLUCOSE SERPL-MCNC: 430 MG/DL — HIGH (ref 70–99)
GLUCOSE SERPL-MCNC: 466 MG/DL — CRITICAL HIGH (ref 70–99)
GLUCOSE UR QL: >=1000 MG/DL
GRAM STN FLD: ABNORMAL
HCO3 BLDA-SCNC: 17 MMOL/L — LOW (ref 21–28)
HCT VFR BLD CALC: 36.4 % — LOW (ref 39–50)
HCT VFR BLD CALC: 37.1 % — LOW (ref 39–50)
HCT VFR BLD CALC: 37.9 % — LOW (ref 39–50)
HCT VFR BLD CALC: 38.2 % — LOW (ref 39–50)
HCT VFR BLD CALC: 41.3 % — SIGNIFICANT CHANGE UP (ref 39–50)
HCT VFR BLD CALC: 42.5 % — SIGNIFICANT CHANGE UP (ref 39–50)
HCT VFR BLD CALC: 44 % — SIGNIFICANT CHANGE UP (ref 39–50)
HCT VFR BLD CALC: 46.7 % — SIGNIFICANT CHANGE UP (ref 39–50)
HDLC SERPL-MCNC: 30 MG/DL — LOW
HDLC SERPL-MCNC: 32 MG/DL — LOW
HGB BLD-MCNC: 12.3 G/DL — LOW (ref 13–17)
HGB BLD-MCNC: 12.6 G/DL — LOW (ref 13–17)
HGB BLD-MCNC: 12.9 G/DL — LOW (ref 13–17)
HGB BLD-MCNC: 13.2 G/DL — SIGNIFICANT CHANGE UP (ref 13–17)
HGB BLD-MCNC: 13.9 G/DL — SIGNIFICANT CHANGE UP (ref 13–17)
HGB BLD-MCNC: 14.3 G/DL — SIGNIFICANT CHANGE UP (ref 13–17)
HGB BLD-MCNC: 15 G/DL — SIGNIFICANT CHANGE UP (ref 13–17)
HGB BLD-MCNC: 15.9 G/DL — SIGNIFICANT CHANGE UP (ref 13–17)
HOROWITZ INDEX BLDA+IHG-RTO: 28 — SIGNIFICANT CHANGE UP
IMM GRANULOCYTES NFR BLD AUTO: 0.5 % — SIGNIFICANT CHANGE UP (ref 0–0.9)
IMM GRANULOCYTES NFR BLD AUTO: 0.6 % — SIGNIFICANT CHANGE UP (ref 0–0.9)
IMM GRANULOCYTES NFR BLD AUTO: 0.8 % — SIGNIFICANT CHANGE UP (ref 0–0.9)
INR BLD: 1.17 RATIO — HIGH (ref 0.85–1.16)
KETONES UR QL: NEGATIVE MG/DL — SIGNIFICANT CHANGE UP
LACTATE SERPL-SCNC: 1.6 MMOL/L — SIGNIFICANT CHANGE UP (ref 0.7–2)
LACTATE SERPL-SCNC: 2.2 MMOL/L — HIGH (ref 0.7–2)
LACTATE SERPL-SCNC: 2.3 MMOL/L — HIGH (ref 0.7–2)
LACTATE SERPL-SCNC: 2.3 MMOL/L — HIGH (ref 0.7–2)
LACTATE SERPL-SCNC: 2.7 MMOL/L — HIGH (ref 0.7–2)
LACTATE SERPL-SCNC: 5.2 MMOL/L — CRITICAL HIGH (ref 0.7–2)
LDLC SERPL-MCNC: 37 MG/DL — SIGNIFICANT CHANGE UP
LDLC SERPL-MCNC: 51 MG/DL — SIGNIFICANT CHANGE UP
LEUKOCYTE ESTERASE UR-ACNC: ABNORMAL
LIPID PNL WITH DIRECT LDL SERPL: 37 MG/DL — SIGNIFICANT CHANGE UP
LIPID PNL WITH DIRECT LDL SERPL: 51 MG/DL — SIGNIFICANT CHANGE UP
LYMPHOCYTES # BLD AUTO: 0.12 K/UL — LOW (ref 1–3.3)
LYMPHOCYTES # BLD AUTO: 0.29 K/UL — LOW (ref 1–3.3)
LYMPHOCYTES # BLD AUTO: 0.57 K/UL — LOW (ref 1–3.3)
LYMPHOCYTES # BLD AUTO: 0.86 K/UL — LOW (ref 1–3.3)
LYMPHOCYTES # BLD AUTO: 0.92 K/UL — LOW (ref 1–3.3)
LYMPHOCYTES # BLD AUTO: 1.17 K/UL — SIGNIFICANT CHANGE UP (ref 1–3.3)
LYMPHOCYTES # BLD AUTO: 11 % — LOW (ref 13–44)
LYMPHOCYTES # BLD AUTO: 3 % — LOW (ref 13–44)
LYMPHOCYTES # BLD AUTO: 3 % — LOW (ref 13–44)
LYMPHOCYTES # BLD AUTO: 6.9 % — LOW (ref 13–44)
LYMPHOCYTES # BLD AUTO: 8.4 % — LOW (ref 13–44)
LYMPHOCYTES # BLD AUTO: 9.2 % — LOW (ref 13–44)
MAGNESIUM SERPL-MCNC: 1.6 MG/DL — SIGNIFICANT CHANGE UP (ref 1.6–2.6)
MAGNESIUM SERPL-MCNC: 1.7 MG/DL — SIGNIFICANT CHANGE UP (ref 1.6–2.6)
MAGNESIUM SERPL-MCNC: 1.7 MG/DL — SIGNIFICANT CHANGE UP (ref 1.6–2.6)
MAGNESIUM SERPL-MCNC: 1.9 MG/DL — SIGNIFICANT CHANGE UP (ref 1.6–2.6)
MAGNESIUM SERPL-MCNC: 1.9 MG/DL — SIGNIFICANT CHANGE UP (ref 1.6–2.6)
MAGNESIUM SERPL-MCNC: 2 MG/DL — SIGNIFICANT CHANGE UP (ref 1.6–2.6)
MAGNESIUM SERPL-MCNC: 2.1 MG/DL — SIGNIFICANT CHANGE UP (ref 1.6–2.6)
MAGNESIUM SERPL-MCNC: 2.1 MG/DL — SIGNIFICANT CHANGE UP (ref 1.6–2.6)
MANUAL SMEAR VERIFICATION: SIGNIFICANT CHANGE UP
MANUAL SMEAR VERIFICATION: SIGNIFICANT CHANGE UP
MCHC RBC-ENTMCNC: 32.7 G/DL — SIGNIFICANT CHANGE UP (ref 32–36)
MCHC RBC-ENTMCNC: 32.7 PG — SIGNIFICANT CHANGE UP (ref 27–34)
MCHC RBC-ENTMCNC: 32.8 PG — SIGNIFICANT CHANGE UP (ref 27–34)
MCHC RBC-ENTMCNC: 32.9 PG — SIGNIFICANT CHANGE UP (ref 27–34)
MCHC RBC-ENTMCNC: 33 PG — SIGNIFICANT CHANGE UP (ref 27–34)
MCHC RBC-ENTMCNC: 33.1 PG — SIGNIFICANT CHANGE UP (ref 27–34)
MCHC RBC-ENTMCNC: 33.3 PG — SIGNIFICANT CHANGE UP (ref 27–34)
MCHC RBC-ENTMCNC: 33.8 G/DL — SIGNIFICANT CHANGE UP (ref 32–36)
MCHC RBC-ENTMCNC: 34 G/DL — SIGNIFICANT CHANGE UP (ref 32–36)
MCHC RBC-ENTMCNC: 34.1 G/DL — SIGNIFICANT CHANGE UP (ref 32–36)
MCHC RBC-ENTMCNC: 34.6 G/DL — SIGNIFICANT CHANGE UP (ref 32–36)
MCHC RBC-ENTMCNC: 34.6 G/DL — SIGNIFICANT CHANGE UP (ref 32–36)
MCV RBC AUTO: 101 FL — HIGH (ref 80–100)
MCV RBC AUTO: 95 FL — SIGNIFICANT CHANGE UP (ref 80–100)
MCV RBC AUTO: 95.4 FL — SIGNIFICANT CHANGE UP (ref 80–100)
MCV RBC AUTO: 96.5 FL — SIGNIFICANT CHANGE UP (ref 80–100)
MCV RBC AUTO: 96.8 FL — SIGNIFICANT CHANGE UP (ref 80–100)
MCV RBC AUTO: 96.9 FL — SIGNIFICANT CHANGE UP (ref 80–100)
MCV RBC AUTO: 97.1 FL — SIGNIFICANT CHANGE UP (ref 80–100)
MCV RBC AUTO: 98.1 FL — SIGNIFICANT CHANGE UP (ref 80–100)
METHADONE UR-MCNC: NEGATIVE — SIGNIFICANT CHANGE UP
METHADONE UR-MCNC: NEGATIVE — SIGNIFICANT CHANGE UP
METHOD TYPE: SIGNIFICANT CHANGE UP
MONOCYTES # BLD AUTO: 0 K/UL — SIGNIFICANT CHANGE UP (ref 0–0.9)
MONOCYTES # BLD AUTO: 0.04 K/UL — SIGNIFICANT CHANGE UP (ref 0–0.9)
MONOCYTES # BLD AUTO: 0.4 K/UL — SIGNIFICANT CHANGE UP (ref 0–0.9)
MONOCYTES # BLD AUTO: 0.48 K/UL — SIGNIFICANT CHANGE UP (ref 0–0.9)
MONOCYTES # BLD AUTO: 0.52 K/UL — SIGNIFICANT CHANGE UP (ref 0–0.9)
MONOCYTES # BLD AUTO: 0.64 K/UL — SIGNIFICANT CHANGE UP (ref 0–0.9)
MONOCYTES NFR BLD AUTO: 0 % — LOW (ref 2–14)
MONOCYTES NFR BLD AUTO: 1 % — LOW (ref 2–14)
MONOCYTES NFR BLD AUTO: 3.6 % — SIGNIFICANT CHANGE UP (ref 2–14)
MONOCYTES NFR BLD AUTO: 5.1 % — SIGNIFICANT CHANGE UP (ref 2–14)
MONOCYTES NFR BLD AUTO: 6 % — SIGNIFICANT CHANGE UP (ref 2–14)
MONOCYTES NFR BLD AUTO: 6.3 % — SIGNIFICANT CHANGE UP (ref 2–14)
MRSA PCR RESULT.: SIGNIFICANT CHANGE UP
NEUTROPHILS # BLD AUTO: 3.91 K/UL — SIGNIFICANT CHANGE UP (ref 1.8–7.4)
NEUTROPHILS # BLD AUTO: 7.09 K/UL — SIGNIFICANT CHANGE UP (ref 1.8–7.4)
NEUTROPHILS # BLD AUTO: 7.92 K/UL — HIGH (ref 1.8–7.4)
NEUTROPHILS # BLD AUTO: 8.77 K/UL — HIGH (ref 1.8–7.4)
NEUTROPHILS # BLD AUTO: 8.8 K/UL — HIGH (ref 1.8–7.4)
NEUTROPHILS # BLD AUTO: 9.51 K/UL — HIGH (ref 1.8–7.4)
NEUTROPHILS NFR BLD AUTO: 82 % — HIGH (ref 43–77)
NEUTROPHILS NFR BLD AUTO: 84.7 % — HIGH (ref 43–77)
NEUTROPHILS NFR BLD AUTO: 86.2 % — HIGH (ref 43–77)
NEUTROPHILS NFR BLD AUTO: 86.8 % — HIGH (ref 43–77)
NEUTROPHILS NFR BLD AUTO: 88 % — HIGH (ref 43–77)
NEUTROPHILS NFR BLD AUTO: 91 % — HIGH (ref 43–77)
NEUTS BAND # BLD: 1 % — SIGNIFICANT CHANGE UP (ref 0–8)
NEUTS BAND # BLD: 4 % — SIGNIFICANT CHANGE UP (ref 0–8)
NEUTS BAND # BLD: 5 % — SIGNIFICANT CHANGE UP (ref 0–8)
NEUTS BAND NFR BLD: 1 % — SIGNIFICANT CHANGE UP (ref 0–8)
NEUTS BAND NFR BLD: 4 % — SIGNIFICANT CHANGE UP (ref 0–8)
NEUTS BAND NFR BLD: 5 % — SIGNIFICANT CHANGE UP (ref 0–8)
NITRITE UR-MCNC: NEGATIVE — SIGNIFICANT CHANGE UP
NONHDLC SERPL-MCNC: 58 MG/DL — SIGNIFICANT CHANGE UP
NONHDLC SERPL-MCNC: 72 MG/DL — SIGNIFICANT CHANGE UP
NRBC # BLD: 0 /100 WBCS — SIGNIFICANT CHANGE UP (ref 0–0)
NRBC BLD AUTO-RTO: 0 /100 WBCS — SIGNIFICANT CHANGE UP (ref 0–0)
NRBC BLD-RTO: 0 /100 WBCS — SIGNIFICANT CHANGE UP (ref 0–0)
OPIATES UR-MCNC: NEGATIVE — SIGNIFICANT CHANGE UP
OPIATES UR-MCNC: NEGATIVE — SIGNIFICANT CHANGE UP
ORGANISM # SPEC MICROSCOPIC CNT: ABNORMAL
OSMOLALITY UR: 363 MOS/KG — SIGNIFICANT CHANGE UP (ref 50–1200)
PCO2 BLDA: 30 MMHG — LOW (ref 35–48)
PCP SPEC-MCNC: SIGNIFICANT CHANGE UP
PCP SPEC-MCNC: SIGNIFICANT CHANGE UP
PCP UR-MCNC: NEGATIVE — SIGNIFICANT CHANGE UP
PCP UR-MCNC: NEGATIVE — SIGNIFICANT CHANGE UP
PH BLDA: 7.37 — SIGNIFICANT CHANGE UP (ref 7.35–7.45)
PH UR: 5 — SIGNIFICANT CHANGE UP (ref 5–8)
PHOSPHATE SERPL-MCNC: 2.1 MG/DL — LOW (ref 2.5–4.5)
PHOSPHATE SERPL-MCNC: 2.1 MG/DL — LOW (ref 2.5–4.5)
PHOSPHATE SERPL-MCNC: 2.4 MG/DL — LOW (ref 2.5–4.5)
PHOSPHATE SERPL-MCNC: 2.5 MG/DL — SIGNIFICANT CHANGE UP (ref 2.5–4.5)
PHOSPHATE SERPL-MCNC: 2.7 MG/DL — SIGNIFICANT CHANGE UP (ref 2.5–4.5)
PHOSPHATE SERPL-MCNC: 3 MG/DL — SIGNIFICANT CHANGE UP (ref 2.5–4.5)
PHOSPHATE SERPL-MCNC: 3 MG/DL — SIGNIFICANT CHANGE UP (ref 2.5–4.5)
PHOSPHATE SERPL-MCNC: 3.4 MG/DL — SIGNIFICANT CHANGE UP (ref 2.5–4.5)
PLAT MORPH BLD: NORMAL — SIGNIFICANT CHANGE UP
PLATELET # BLD AUTO: 30 K/UL — LOW (ref 150–400)
PLATELET # BLD AUTO: 36 K/UL — LOW (ref 150–400)
PLATELET # BLD AUTO: 38 K/UL — LOW (ref 150–400)
PLATELET # BLD AUTO: 40 K/UL — LOW (ref 150–400)
PLATELET # BLD AUTO: 43 K/UL — LOW (ref 150–400)
PLATELET # BLD AUTO: 44 K/UL — LOW (ref 150–400)
PLATELET # BLD AUTO: 51 K/UL — LOW (ref 150–400)
PLATELET # BLD AUTO: 55 K/UL — LOW (ref 150–400)
PLATELET COUNT - ESTIMATE: ABNORMAL
PO2 BLDA: 143 MMHG — HIGH (ref 83–108)
POTASSIUM SERPL-MCNC: 2.9 MMOL/L — CRITICAL LOW (ref 3.5–5.3)
POTASSIUM SERPL-MCNC: 3.2 MMOL/L — LOW (ref 3.5–5.3)
POTASSIUM SERPL-MCNC: 3.2 MMOL/L — LOW (ref 3.5–5.3)
POTASSIUM SERPL-MCNC: 3.3 MMOL/L — LOW (ref 3.5–5.3)
POTASSIUM SERPL-MCNC: 3.5 MMOL/L — SIGNIFICANT CHANGE UP (ref 3.5–5.3)
POTASSIUM SERPL-MCNC: 3.6 MMOL/L — SIGNIFICANT CHANGE UP (ref 3.5–5.3)
POTASSIUM SERPL-MCNC: 3.8 MMOL/L — SIGNIFICANT CHANGE UP (ref 3.5–5.3)
POTASSIUM SERPL-MCNC: 4 MMOL/L — SIGNIFICANT CHANGE UP (ref 3.5–5.3)
POTASSIUM SERPL-MCNC: 4.1 MMOL/L — SIGNIFICANT CHANGE UP (ref 3.5–5.3)
POTASSIUM SERPL-MCNC: 4.1 MMOL/L — SIGNIFICANT CHANGE UP (ref 3.5–5.3)
POTASSIUM SERPL-SCNC: 2.9 MMOL/L — CRITICAL LOW (ref 3.5–5.3)
POTASSIUM SERPL-SCNC: 3.2 MMOL/L — LOW (ref 3.5–5.3)
POTASSIUM SERPL-SCNC: 3.2 MMOL/L — LOW (ref 3.5–5.3)
POTASSIUM SERPL-SCNC: 3.3 MMOL/L — LOW (ref 3.5–5.3)
POTASSIUM SERPL-SCNC: 3.5 MMOL/L — SIGNIFICANT CHANGE UP (ref 3.5–5.3)
POTASSIUM SERPL-SCNC: 3.6 MMOL/L — SIGNIFICANT CHANGE UP (ref 3.5–5.3)
POTASSIUM SERPL-SCNC: 3.8 MMOL/L — SIGNIFICANT CHANGE UP (ref 3.5–5.3)
POTASSIUM SERPL-SCNC: 4 MMOL/L — SIGNIFICANT CHANGE UP (ref 3.5–5.3)
POTASSIUM SERPL-SCNC: 4.1 MMOL/L — SIGNIFICANT CHANGE UP (ref 3.5–5.3)
POTASSIUM SERPL-SCNC: 4.1 MMOL/L — SIGNIFICANT CHANGE UP (ref 3.5–5.3)
PROT ?TM UR-MCNC: 97 MG/DL — HIGH (ref 0–12)
PROT SERPL-MCNC: 5 G/DL — LOW (ref 6–8.3)
PROT SERPL-MCNC: 5 G/DL — LOW (ref 6–8.3)
PROT SERPL-MCNC: 5.2 G/DL — LOW (ref 6–8.3)
PROT SERPL-MCNC: 5.2 G/DL — LOW (ref 6–8.3)
PROT SERPL-MCNC: 5.3 G/DL — LOW (ref 6–8.3)
PROT SERPL-MCNC: 5.4 G/DL — LOW (ref 6–8.3)
PROT SERPL-MCNC: 5.4 G/DL — LOW (ref 6–8.3)
PROT SERPL-MCNC: 5.8 G/DL — LOW (ref 6–8.3)
PROT SERPL-MCNC: 5.8 G/DL — LOW (ref 6–8.3)
PROT UR-MCNC: 30 MG/DL
PROT/CREAT UR-RTO: 2.2 RATIO — HIGH (ref 0–0.2)
PROTHROM AB SERPL-ACNC: 13.7 SEC — HIGH (ref 9.9–13.4)
RBC # BLD: 3.76 M/UL — LOW (ref 4.2–5.8)
RBC # BLD: 3.78 M/UL — LOW (ref 4.2–5.8)
RBC # BLD: 3.91 M/UL — LOW (ref 4.2–5.8)
RBC # BLD: 4.02 M/UL — LOW (ref 4.2–5.8)
RBC # BLD: 4.21 M/UL — SIGNIFICANT CHANGE UP (ref 4.2–5.8)
RBC # BLD: 4.33 M/UL — SIGNIFICANT CHANGE UP (ref 4.2–5.8)
RBC # BLD: 4.53 M/UL — SIGNIFICANT CHANGE UP (ref 4.2–5.8)
RBC # BLD: 4.84 M/UL — SIGNIFICANT CHANGE UP (ref 4.2–5.8)
RBC # FLD: 15 % — HIGH (ref 10.3–14.5)
RBC # FLD: 15.2 % — HIGH (ref 10.3–14.5)
RBC # FLD: 15.5 % — HIGH (ref 10.3–14.5)
RBC # FLD: 15.6 % — HIGH (ref 10.3–14.5)
RBC # FLD: 15.9 % — HIGH (ref 10.3–14.5)
RBC # FLD: 16 % — HIGH (ref 10.3–14.5)
RBC BLD AUTO: NORMAL — SIGNIFICANT CHANGE UP
RBC CASTS # UR COMP ASSIST: 10 /HPF — HIGH (ref 0–4)
RSV RNA NPH QL NAA+NON-PROBE: SIGNIFICANT CHANGE UP
S AUREUS DNA NOSE QL NAA+PROBE: SIGNIFICANT CHANGE UP
SAO2 % BLDA: 99 % — SIGNIFICANT CHANGE UP
SARS-COV-2 RNA SPEC QL NAA+PROBE: SIGNIFICANT CHANGE UP
SODIUM SERPL-SCNC: 131 MMOL/L — LOW (ref 135–145)
SODIUM SERPL-SCNC: 133 MMOL/L — LOW (ref 135–145)
SODIUM SERPL-SCNC: 135 MMOL/L — SIGNIFICANT CHANGE UP (ref 135–145)
SODIUM SERPL-SCNC: 140 MMOL/L — SIGNIFICANT CHANGE UP (ref 135–145)
SODIUM SERPL-SCNC: 141 MMOL/L — SIGNIFICANT CHANGE UP (ref 135–145)
SODIUM SERPL-SCNC: 142 MMOL/L — SIGNIFICANT CHANGE UP (ref 135–145)
SODIUM SERPL-SCNC: 144 MMOL/L — SIGNIFICANT CHANGE UP (ref 135–145)
SODIUM SERPL-SCNC: 145 MMOL/L — SIGNIFICANT CHANGE UP (ref 135–145)
SODIUM SERPL-SCNC: 151 MMOL/L — HIGH (ref 135–145)
SODIUM SERPL-SCNC: 152 MMOL/L — HIGH (ref 135–145)
SODIUM UR-SCNC: 27 MMOL/L — SIGNIFICANT CHANGE UP
SOURCE RESPIRATORY: SIGNIFICANT CHANGE UP
SP GR SPEC: 1.01 — SIGNIFICANT CHANGE UP (ref 1–1.03)
SPECIMEN SOURCE: SIGNIFICANT CHANGE UP
THC UR QL: NEGATIVE — SIGNIFICANT CHANGE UP
THC UR QL: NEGATIVE — SIGNIFICANT CHANGE UP
TRIGL SERPL-MCNC: 111 MG/DL — SIGNIFICANT CHANGE UP
TRIGL SERPL-MCNC: 117 MG/DL — SIGNIFICANT CHANGE UP
TROPONIN I, HIGH SENSITIVITY RESULT: 3653.2 NG/L — HIGH
TROPONIN I, HIGH SENSITIVITY RESULT: 5123 NG/L — HIGH
TROPONIN I, HIGH SENSITIVITY RESULT: 5393.4 NG/L — HIGH
TSH SERPL-MCNC: 0.28 UU/ML — LOW (ref 0.34–4.82)
UROBILINOGEN FLD QL: 0.2 MG/DL — SIGNIFICANT CHANGE UP (ref 0.2–1)
UUN UR-MCNC: 565 MG/DL — SIGNIFICANT CHANGE UP
VARIANT LYMPHS # BLD: 5 % — SIGNIFICANT CHANGE UP (ref 0–6)
VARIANT LYMPHS NFR BLD MANUAL: 5 % — SIGNIFICANT CHANGE UP (ref 0–6)
WBC # BLD: 10.6 K/UL — HIGH (ref 3.8–10.5)
WBC # BLD: 10.96 K/UL — HIGH (ref 3.8–10.5)
WBC # BLD: 12.49 K/UL — HIGH (ref 3.8–10.5)
WBC # BLD: 15.96 K/UL — HIGH (ref 3.8–10.5)
WBC # BLD: 4.07 K/UL — SIGNIFICANT CHANGE UP (ref 3.8–10.5)
WBC # BLD: 8.23 K/UL — SIGNIFICANT CHANGE UP (ref 3.8–10.5)
WBC # BLD: 9.36 K/UL — SIGNIFICANT CHANGE UP (ref 3.8–10.5)
WBC # BLD: 9.53 K/UL — SIGNIFICANT CHANGE UP (ref 3.8–10.5)
WBC # FLD AUTO: 10.6 K/UL — HIGH (ref 3.8–10.5)
WBC # FLD AUTO: 10.96 K/UL — HIGH (ref 3.8–10.5)
WBC # FLD AUTO: 12.49 K/UL — HIGH (ref 3.8–10.5)
WBC # FLD AUTO: 15.96 K/UL — HIGH (ref 3.8–10.5)
WBC # FLD AUTO: 4.07 K/UL — SIGNIFICANT CHANGE UP (ref 3.8–10.5)
WBC # FLD AUTO: 8.23 K/UL — SIGNIFICANT CHANGE UP (ref 3.8–10.5)
WBC # FLD AUTO: 9.36 K/UL — SIGNIFICANT CHANGE UP (ref 3.8–10.5)
WBC # FLD AUTO: 9.53 K/UL — SIGNIFICANT CHANGE UP (ref 3.8–10.5)
WBC UR QL: 50 /HPF — HIGH (ref 0–5)

## 2025-01-01 PROCEDURE — 83605 ASSAY OF LACTIC ACID: CPT

## 2025-01-01 PROCEDURE — 82570 ASSAY OF URINE CREATININE: CPT

## 2025-01-01 PROCEDURE — 84443 ASSAY THYROID STIM HORMONE: CPT

## 2025-01-01 PROCEDURE — 80053 COMPREHEN METABOLIC PANEL: CPT

## 2025-01-01 PROCEDURE — 82010 KETONE BODYS QUAN: CPT

## 2025-01-01 PROCEDURE — 99223 1ST HOSP IP/OBS HIGH 75: CPT

## 2025-01-01 PROCEDURE — 87077 CULTURE AEROBIC IDENTIFY: CPT

## 2025-01-01 PROCEDURE — 99232 SBSQ HOSP IP/OBS MODERATE 35: CPT

## 2025-01-01 PROCEDURE — 80307 DRUG TEST PRSMV CHEM ANLYZR: CPT

## 2025-01-01 PROCEDURE — 96374 THER/PROPH/DIAG INJ IV PUSH: CPT

## 2025-01-01 PROCEDURE — 87086 URINE CULTURE/COLONY COUNT: CPT

## 2025-01-01 PROCEDURE — 84132 ASSAY OF SERUM POTASSIUM: CPT

## 2025-01-01 PROCEDURE — 70450 CT HEAD/BRAIN W/O DYE: CPT

## 2025-01-01 PROCEDURE — 95816 EEG AWAKE AND DROWSY: CPT

## 2025-01-01 PROCEDURE — 96375 TX/PRO/DX INJ NEW DRUG ADDON: CPT

## 2025-01-01 PROCEDURE — 87040 BLOOD CULTURE FOR BACTERIA: CPT

## 2025-01-01 PROCEDURE — 87641 MR-STAPH DNA AMP PROBE: CPT

## 2025-01-01 PROCEDURE — 85025 COMPLETE CBC W/AUTO DIFF WBC: CPT

## 2025-01-01 PROCEDURE — 85027 COMPLETE CBC AUTOMATED: CPT

## 2025-01-01 PROCEDURE — 84100 ASSAY OF PHOSPHORUS: CPT

## 2025-01-01 PROCEDURE — 82962 GLUCOSE BLOOD TEST: CPT

## 2025-01-01 PROCEDURE — 93005 ELECTROCARDIOGRAM TRACING: CPT

## 2025-01-01 PROCEDURE — 82947 ASSAY GLUCOSE BLOOD QUANT: CPT

## 2025-01-01 PROCEDURE — 84540 ASSAY OF URINE/UREA-N: CPT

## 2025-01-01 PROCEDURE — 74176 CT ABD & PELVIS W/O CONTRAST: CPT

## 2025-01-01 PROCEDURE — C8929: CPT

## 2025-01-01 PROCEDURE — 71045 X-RAY EXAM CHEST 1 VIEW: CPT | Mod: 26

## 2025-01-01 PROCEDURE — 99233 SBSQ HOSP IP/OBS HIGH 50: CPT

## 2025-01-01 PROCEDURE — 99233 SBSQ HOSP IP/OBS HIGH 50: CPT | Mod: GC

## 2025-01-01 PROCEDURE — 84484 ASSAY OF TROPONIN QUANT: CPT

## 2025-01-01 PROCEDURE — 80061 LIPID PANEL: CPT

## 2025-01-01 PROCEDURE — 82009 KETONE BODYS QUAL: CPT

## 2025-01-01 PROCEDURE — 83735 ASSAY OF MAGNESIUM: CPT

## 2025-01-01 PROCEDURE — 85610 PROTHROMBIN TIME: CPT

## 2025-01-01 PROCEDURE — 71260 CT THORAX DX C+: CPT

## 2025-01-01 PROCEDURE — 84295 ASSAY OF SERUM SODIUM: CPT

## 2025-01-01 PROCEDURE — 81001 URINALYSIS AUTO W/SCOPE: CPT

## 2025-01-01 PROCEDURE — 83036 HEMOGLOBIN GLYCOSYLATED A1C: CPT

## 2025-01-01 PROCEDURE — 70496 CT ANGIOGRAPHY HEAD: CPT

## 2025-01-01 PROCEDURE — 95816 EEG AWAKE AND DROWSY: CPT | Mod: 26

## 2025-01-01 PROCEDURE — 70498 CT ANGIOGRAPHY NECK: CPT | Mod: 26

## 2025-01-01 PROCEDURE — 96361 HYDRATE IV INFUSION ADD-ON: CPT

## 2025-01-01 PROCEDURE — 74176 CT ABD & PELVIS W/O CONTRAST: CPT | Mod: 26

## 2025-01-01 PROCEDURE — 84156 ASSAY OF PROTEIN URINE: CPT

## 2025-01-01 PROCEDURE — 99291 CRITICAL CARE FIRST HOUR: CPT | Mod: 25

## 2025-01-01 PROCEDURE — 87150 DNA/RNA AMPLIFIED PROBE: CPT

## 2025-01-01 PROCEDURE — 71045 X-RAY EXAM CHEST 1 VIEW: CPT

## 2025-01-01 PROCEDURE — 85730 THROMBOPLASTIN TIME PARTIAL: CPT

## 2025-01-01 PROCEDURE — 36415 COLL VENOUS BLD VENIPUNCTURE: CPT

## 2025-01-01 PROCEDURE — 87186 SC STD MICRODIL/AGAR DIL: CPT

## 2025-01-01 PROCEDURE — 95957 EEG DIGITAL ANALYSIS: CPT

## 2025-01-01 PROCEDURE — 99222 1ST HOSP IP/OBS MODERATE 55: CPT

## 2025-01-01 PROCEDURE — 87640 STAPH A DNA AMP PROBE: CPT

## 2025-01-01 PROCEDURE — 99231 SBSQ HOSP IP/OBS SF/LOW 25: CPT

## 2025-01-01 PROCEDURE — 70496 CT ANGIOGRAPHY HEAD: CPT | Mod: 26

## 2025-01-01 PROCEDURE — 87637 SARSCOV2&INF A&B&RSV AMP PRB: CPT

## 2025-01-01 PROCEDURE — 84300 ASSAY OF URINE SODIUM: CPT

## 2025-01-01 PROCEDURE — 82330 ASSAY OF CALCIUM: CPT

## 2025-01-01 PROCEDURE — 99233 SBSQ HOSP IP/OBS HIGH 50: CPT | Mod: 25

## 2025-01-01 PROCEDURE — 82803 BLOOD GASES ANY COMBINATION: CPT

## 2025-01-01 PROCEDURE — 83935 ASSAY OF URINE OSMOLALITY: CPT

## 2025-01-01 PROCEDURE — 70450 CT HEAD/BRAIN W/O DYE: CPT | Mod: 26

## 2025-01-01 PROCEDURE — 93306 TTE W/DOPPLER COMPLETE: CPT | Mod: 26

## 2025-01-01 PROCEDURE — 80048 BASIC METABOLIC PNL TOTAL CA: CPT

## 2025-01-01 PROCEDURE — 93010 ELECTROCARDIOGRAM REPORT: CPT

## 2025-01-01 PROCEDURE — 92610 EVALUATE SWALLOWING FUNCTION: CPT

## 2025-01-01 PROCEDURE — 70498 CT ANGIOGRAPHY NECK: CPT

## 2025-01-01 PROCEDURE — 71260 CT THORAX DX C+: CPT | Mod: 26

## 2025-01-01 PROCEDURE — 99222 1ST HOSP IP/OBS MODERATE 55: CPT | Mod: GC

## 2025-01-01 PROCEDURE — 99291 CRITICAL CARE FIRST HOUR: CPT

## 2025-01-01 RX ORDER — HYDROMORPHONE/SOD CHLOR,ISO/PF 2 MG/10 ML
0.2 SYRINGE (ML) INJECTION EVERY 4 HOURS
Refills: 0 | Status: DISCONTINUED | OUTPATIENT
Start: 2025-01-01 | End: 2025-01-01

## 2025-01-01 RX ORDER — PIPERACILLIN-TAZO-DEXTROSE,ISO 3.375G/5
3.38 IV SOLUTION, PIGGYBACK PREMIX FROZEN(ML) INTRAVENOUS ONCE
Refills: 0 | Status: COMPLETED | OUTPATIENT
Start: 2025-01-01 | End: 2025-01-01

## 2025-01-01 RX ORDER — TAMSULOSIN HYDROCHLORIDE 0.4 MG/1
0.4 CAPSULE ORAL AT BEDTIME
Refills: 0 | Status: DISCONTINUED | OUTPATIENT
Start: 2025-01-01 | End: 2025-01-01

## 2025-01-01 RX ORDER — ACETAMINOPHEN 500 MG/5ML
1000 LIQUID (ML) ORAL ONCE
Refills: 0 | Status: COMPLETED | OUTPATIENT
Start: 2025-01-01 | End: 2025-01-01

## 2025-01-01 RX ORDER — ASPIRIN 325 MG
81 TABLET ORAL DAILY
Refills: 0 | Status: DISCONTINUED | OUTPATIENT
Start: 2025-01-01 | End: 2025-01-01

## 2025-01-01 RX ORDER — DIAZEPAM 5 MG/1
10 TABLET ORAL ONCE
Refills: 0 | Status: DISCONTINUED | OUTPATIENT
Start: 2025-01-01 | End: 2025-01-01

## 2025-01-01 RX ORDER — LORAZEPAM 4 MG/ML
2 VIAL (ML) INJECTION ONCE
Refills: 0 | Status: DISCONTINUED | OUTPATIENT
Start: 2025-01-01 | End: 2025-01-01

## 2025-01-01 RX ORDER — INSULIN LISPRO 100 U/ML
INJECTION, SOLUTION INTRAVENOUS; SUBCUTANEOUS AT BEDTIME
Refills: 0 | Status: DISCONTINUED | OUTPATIENT
Start: 2025-01-01 | End: 2025-01-01

## 2025-01-01 RX ORDER — CLOPIDOGREL BISULFATE 75 MG/1
75 TABLET, FILM COATED ORAL DAILY
Refills: 0 | Status: DISCONTINUED | OUTPATIENT
Start: 2025-01-01 | End: 2025-01-01

## 2025-01-01 RX ORDER — INSULIN LISPRO 100 U/ML
INJECTION, SOLUTION INTRAVENOUS; SUBCUTANEOUS EVERY 6 HOURS
Refills: 0 | Status: DISCONTINUED | OUTPATIENT
Start: 2025-01-01 | End: 2025-01-01

## 2025-01-01 RX ORDER — POTASSIUM CHLORIDE, DEXTROSE MONOHYDRATE AND SODIUM CHLORIDE 150; 5; 900 MG/100ML; G/100ML; MG/100ML
1000 INJECTION, SOLUTION INTRAVENOUS
Refills: 0 | Status: DISCONTINUED | OUTPATIENT
Start: 2025-01-01 | End: 2025-01-01

## 2025-01-01 RX ORDER — HYDROMORPHONE/SOD CHLOR,ISO/PF 2 MG/10 ML
0.5 SYRINGE (ML) INJECTION EVERY 4 HOURS
Refills: 0 | Status: DISCONTINUED | OUTPATIENT
Start: 2025-01-01 | End: 2025-01-01

## 2025-01-01 RX ORDER — SODIUM CHLORIDE 9 G/1000ML
1000 INJECTION, SOLUTION INTRAVENOUS
Refills: 0 | Status: DISCONTINUED | OUTPATIENT
Start: 2025-01-01 | End: 2025-01-01

## 2025-01-01 RX ORDER — GLYCOPYRROLATE 0.2 MG/ML
0.4 INJECTION INTRAMUSCULAR; INTRAVENOUS
Refills: 0 | Status: DISCONTINUED | OUTPATIENT
Start: 2025-01-01 | End: 2025-01-01

## 2025-01-01 RX ORDER — ROSUVASTATIN CALCIUM 20 MG/1
40 TABLET, FILM COATED ORAL AT BEDTIME
Refills: 0 | Status: DISCONTINUED | OUTPATIENT
Start: 2025-01-01 | End: 2025-01-01

## 2025-01-01 RX ORDER — SERTRALINE 100 MG/1
25 TABLET, FILM COATED ORAL DAILY
Refills: 0 | Status: DISCONTINUED | OUTPATIENT
Start: 2025-01-01 | End: 2025-01-01

## 2025-01-01 RX ORDER — CLOPIDOGREL BISULFATE 75 MG/1
75 TABLET, FILM COATED ORAL ONCE
Refills: 0 | Status: COMPLETED | OUTPATIENT
Start: 2025-01-01 | End: 2025-01-01

## 2025-01-01 RX ORDER — HYDROMORPHONE/SOD CHLOR,ISO/PF 2 MG/10 ML
0.5 SYRINGE (ML) INJECTION
Refills: 0 | Status: DISCONTINUED | OUTPATIENT
Start: 2025-01-01 | End: 2025-01-01

## 2025-01-01 RX ORDER — INSULIN LISPRO 100 U/ML
3 INJECTION, SOLUTION INTRAVENOUS; SUBCUTANEOUS
Refills: 0 | Status: DISCONTINUED | OUTPATIENT
Start: 2025-01-01 | End: 2025-01-01

## 2025-01-01 RX ORDER — HYDROMORPHONE/SOD CHLOR,ISO/PF 2 MG/10 ML
0.5 SYRINGE (ML) INJECTION
Qty: 30 | Refills: 0 | Status: DISCONTINUED | OUTPATIENT
Start: 2025-01-01 | End: 2025-01-01

## 2025-01-01 RX ORDER — POTASSIUM PHOSPHATE, MONOBASIC POTASSIUM PHOSPHATE, DIBASIC INJECTION, 236; 224 MG/ML; MG/ML
15 SOLUTION, CONCENTRATE INTRAVENOUS ONCE
Refills: 0 | Status: DISCONTINUED | OUTPATIENT
Start: 2025-01-01 | End: 2025-01-01

## 2025-01-01 RX ORDER — LEVETIRACETAM 10 MG/ML
1500 INJECTION, SOLUTION INTRAVENOUS ONCE
Refills: 0 | Status: DISCONTINUED | OUTPATIENT
Start: 2025-01-01 | End: 2025-01-01

## 2025-01-01 RX ORDER — CEFEPIME 2 G/20ML
1000 INJECTION, POWDER, FOR SOLUTION INTRAVENOUS ONCE
Refills: 0 | Status: COMPLETED | OUTPATIENT
Start: 2025-01-01 | End: 2025-01-01

## 2025-01-01 RX ORDER — ACETAMINOPHEN 500 MG/5ML
650 LIQUID (ML) ORAL ONCE
Refills: 0 | Status: COMPLETED | OUTPATIENT
Start: 2025-01-01 | End: 2025-01-01

## 2025-01-01 RX ORDER — SOD PHOS DI, MONO/K PHOS MONO 250 MG
1 TABLET ORAL ONCE
Refills: 0 | Status: COMPLETED | OUTPATIENT
Start: 2025-01-01 | End: 2025-01-01

## 2025-01-01 RX ORDER — INSULIN GLARGINE-YFGN 100 [IU]/ML
7 INJECTION, SOLUTION SUBCUTANEOUS AT BEDTIME
Refills: 0 | Status: DISCONTINUED | OUTPATIENT
Start: 2025-01-01 | End: 2025-01-01

## 2025-01-01 RX ORDER — LEVETIRACETAM 10 MG/ML
500 INJECTION, SOLUTION INTRAVENOUS EVERY 12 HOURS
Refills: 0 | Status: DISCONTINUED | OUTPATIENT
Start: 2025-01-01 | End: 2025-01-01

## 2025-01-01 RX ORDER — ACETAMINOPHEN 500 MG/5ML
80 LIQUID (ML) ORAL EVERY 6 HOURS
Refills: 0 | Status: DISCONTINUED | OUTPATIENT
Start: 2025-01-01 | End: 2025-01-01

## 2025-01-01 RX ORDER — POTASSIUM PHOSPHATE, MONOBASIC POTASSIUM PHOSPHATE, DIBASIC INJECTION, 236; 224 MG/ML; MG/ML
15 SOLUTION, CONCENTRATE INTRAVENOUS ONCE
Refills: 0 | Status: COMPLETED | OUTPATIENT
Start: 2025-01-01 | End: 2025-01-01

## 2025-01-01 RX ORDER — CLOPIDOGREL BISULFATE 75 MG/1
TABLET, FILM COATED ORAL
Refills: 0 | Status: DISCONTINUED | OUTPATIENT
Start: 2025-01-01 | End: 2025-01-01

## 2025-01-01 RX ORDER — LEVETIRACETAM 10 MG/ML
1500 INJECTION, SOLUTION INTRAVENOUS ONCE
Refills: 0 | Status: COMPLETED | OUTPATIENT
Start: 2025-01-01 | End: 2025-01-01

## 2025-01-01 RX ORDER — DEXTROSE 50 % IN WATER 50 %
25 SYRINGE (ML) INTRAVENOUS ONCE
Refills: 0 | Status: DISCONTINUED | OUTPATIENT
Start: 2025-01-01 | End: 2025-01-01

## 2025-01-01 RX ORDER — HYDROMORPHONE/SOD CHLOR,ISO/PF 2 MG/10 ML
0.3 SYRINGE (ML) INJECTION
Qty: 30 | Refills: 0 | Status: DISCONTINUED | OUTPATIENT
Start: 2025-01-01 | End: 2025-01-01

## 2025-01-01 RX ORDER — INSULIN LISPRO 100 U/ML
INJECTION, SOLUTION INTRAVENOUS; SUBCUTANEOUS
Refills: 0 | Status: DISCONTINUED | OUTPATIENT
Start: 2025-01-01 | End: 2025-01-01

## 2025-01-01 RX ORDER — CILOSTAZOL 50 MG/1
100 TABLET ORAL
Refills: 0 | Status: DISCONTINUED | OUTPATIENT
Start: 2025-01-01 | End: 2025-01-01

## 2025-01-01 RX ORDER — CEFTRIAXONE 500 MG/1
2000 INJECTION, POWDER, FOR SOLUTION INTRAMUSCULAR; INTRAVENOUS EVERY 24 HOURS
Refills: 0 | Status: DISCONTINUED | OUTPATIENT
Start: 2025-01-01 | End: 2025-01-01

## 2025-01-01 RX ORDER — GLUCAGON 3 MG/1
1 POWDER NASAL ONCE
Refills: 0 | Status: DISCONTINUED | OUTPATIENT
Start: 2025-01-01 | End: 2025-01-01

## 2025-01-01 RX ORDER — LORAZEPAM 4 MG/ML
0.5 VIAL (ML) INJECTION EVERY 4 HOURS
Refills: 0 | Status: DISCONTINUED | OUTPATIENT
Start: 2025-01-01 | End: 2025-01-01

## 2025-01-01 RX ORDER — HYDROMORPHONE/SOD CHLOR,ISO/PF 2 MG/10 ML
0.2 SYRINGE (ML) INJECTION EVERY 6 HOURS
Refills: 0 | Status: DISCONTINUED | OUTPATIENT
Start: 2025-01-01 | End: 2025-01-01

## 2025-01-01 RX ORDER — VANCOMYCIN HCL IN 5 % DEXTROSE 1.5G/250ML
1000 PLASTIC BAG, INJECTION (ML) INTRAVENOUS ONCE
Refills: 0 | Status: COMPLETED | OUTPATIENT
Start: 2025-01-01 | End: 2025-01-01

## 2025-01-01 RX ORDER — PIPERACILLIN-TAZO-DEXTROSE,ISO 3.375G/5
3.38 IV SOLUTION, PIGGYBACK PREMIX FROZEN(ML) INTRAVENOUS EVERY 8 HOURS
Refills: 0 | Status: DISCONTINUED | OUTPATIENT
Start: 2025-01-01 | End: 2025-01-01

## 2025-01-01 RX ORDER — INSULIN LISPRO 100 U/ML
4 INJECTION, SOLUTION INTRAVENOUS; SUBCUTANEOUS
Refills: 0 | Status: DISCONTINUED | OUTPATIENT
Start: 2025-01-01 | End: 2025-01-01

## 2025-01-01 RX ORDER — ACETAMINOPHEN 500 MG/5ML
650 LIQUID (ML) ORAL EVERY 6 HOURS
Refills: 0 | Status: DISCONTINUED | OUTPATIENT
Start: 2025-01-01 | End: 2025-01-01

## 2025-01-01 RX ORDER — SODIUM CHLORIDE 9 G/1000ML
500 INJECTION, SOLUTION INTRAVENOUS ONCE
Refills: 0 | Status: COMPLETED | OUTPATIENT
Start: 2025-01-01 | End: 2025-01-01

## 2025-01-01 RX ORDER — DEXTROSE 50 % IN WATER 50 %
12.5 SYRINGE (ML) INTRAVENOUS ONCE
Refills: 0 | Status: DISCONTINUED | OUTPATIENT
Start: 2025-01-01 | End: 2025-01-01

## 2025-01-01 RX ORDER — ACETAMINOPHEN 500 MG/5ML
500 LIQUID (ML) ORAL ONCE
Refills: 0 | Status: COMPLETED | OUTPATIENT
Start: 2025-01-01 | End: 2025-01-01

## 2025-01-01 RX ORDER — INSULIN GLARGINE-YFGN 100 [IU]/ML
12 INJECTION, SOLUTION SUBCUTANEOUS AT BEDTIME
Refills: 0 | Status: DISCONTINUED | OUTPATIENT
Start: 2025-01-01 | End: 2025-01-01

## 2025-01-01 RX ORDER — INSULIN GLARGINE-YFGN 100 [IU]/ML
10 INJECTION, SOLUTION SUBCUTANEOUS AT BEDTIME
Refills: 0 | Status: DISCONTINUED | OUTPATIENT
Start: 2025-01-01 | End: 2025-01-01

## 2025-01-01 RX ORDER — DEXTROSE 50 % IN WATER 50 %
15 SYRINGE (ML) INTRAVENOUS ONCE
Refills: 0 | Status: DISCONTINUED | OUTPATIENT
Start: 2025-01-01 | End: 2025-01-01

## 2025-01-01 RX ADMIN — Medication 1 APPLICATION(S): at 05:47

## 2025-01-01 RX ADMIN — Medication 1 APPLICATION(S): at 05:02

## 2025-01-01 RX ADMIN — CEFTRIAXONE 100 MILLIGRAM(S): 500 INJECTION, POWDER, FOR SOLUTION INTRAMUSCULAR; INTRAVENOUS at 17:40

## 2025-01-01 RX ADMIN — Medication 0.5 MILLIGRAM(S): at 18:47

## 2025-01-01 RX ADMIN — LEVETIRACETAM 420 MILLIGRAM(S): 10 INJECTION, SOLUTION INTRAVENOUS at 17:03

## 2025-01-01 RX ADMIN — Medication 81 MILLIGRAM(S): at 11:43

## 2025-01-01 RX ADMIN — CEFTRIAXONE 100 MILLIGRAM(S): 500 INJECTION, POWDER, FOR SOLUTION INTRAMUSCULAR; INTRAVENOUS at 18:24

## 2025-01-01 RX ADMIN — Medication 400 MILLIGRAM(S): at 15:56

## 2025-01-01 RX ADMIN — INSULIN LISPRO 3: 100 INJECTION, SOLUTION INTRAVENOUS; SUBCUTANEOUS at 16:39

## 2025-01-01 RX ADMIN — INSULIN LISPRO 6: 100 INJECTION, SOLUTION INTRAVENOUS; SUBCUTANEOUS at 05:27

## 2025-01-01 RX ADMIN — GLYCOPYRROLATE 0.4 MILLIGRAM(S): 0.2 INJECTION INTRAMUSCULAR; INTRAVENOUS at 23:28

## 2025-01-01 RX ADMIN — Medication 40 MILLIEQUIVALENT(S): at 08:52

## 2025-01-01 RX ADMIN — INSULIN GLARGINE-YFGN 12 UNIT(S): 100 INJECTION, SOLUTION SUBCUTANEOUS at 22:10

## 2025-01-01 RX ADMIN — Medication 0.5 MG/HR: at 12:51

## 2025-01-01 RX ADMIN — Medication 650 MILLIGRAM(S): at 22:10

## 2025-01-01 RX ADMIN — Medication 1 APPLICATION(S): at 06:24

## 2025-01-01 RX ADMIN — TAMSULOSIN HYDROCHLORIDE 0.4 MILLIGRAM(S): 0.4 CAPSULE ORAL at 21:54

## 2025-01-01 RX ADMIN — INSULIN LISPRO 5: 100 INJECTION, SOLUTION INTRAVENOUS; SUBCUTANEOUS at 08:33

## 2025-01-01 RX ADMIN — INSULIN LISPRO 6: 100 INJECTION, SOLUTION INTRAVENOUS; SUBCUTANEOUS at 17:14

## 2025-01-01 RX ADMIN — Medication 1000 MILLILITER(S): at 09:15

## 2025-01-01 RX ADMIN — LEVETIRACETAM 420 MILLIGRAM(S): 10 INJECTION, SOLUTION INTRAVENOUS at 06:14

## 2025-01-01 RX ADMIN — LEVETIRACETAM 420 MILLIGRAM(S): 10 INJECTION, SOLUTION INTRAVENOUS at 17:05

## 2025-01-01 RX ADMIN — INSULIN GLARGINE-YFGN 10 UNIT(S): 100 INJECTION, SOLUTION SUBCUTANEOUS at 22:02

## 2025-01-01 RX ADMIN — SODIUM CHLORIDE 75 MILLILITER(S): 9 INJECTION, SOLUTION INTRAVENOUS at 21:54

## 2025-01-01 RX ADMIN — LEVETIRACETAM 420 MILLIGRAM(S): 10 INJECTION, SOLUTION INTRAVENOUS at 06:23

## 2025-01-01 RX ADMIN — Medication 0.3 MG/HR: at 08:00

## 2025-01-01 RX ADMIN — Medication 100 MILLIEQUIVALENT(S): at 17:58

## 2025-01-01 RX ADMIN — Medication 1 APPLICATION(S): at 06:13

## 2025-01-01 RX ADMIN — Medication 0.5 MILLIGRAM(S): at 22:23

## 2025-01-01 RX ADMIN — Medication 0.5 MG/HR: at 11:51

## 2025-01-01 RX ADMIN — CEFTRIAXONE 100 MILLIGRAM(S): 500 INJECTION, POWDER, FOR SOLUTION INTRAMUSCULAR; INTRAVENOUS at 17:27

## 2025-01-01 RX ADMIN — Medication 1 APPLICATION(S): at 05:01

## 2025-01-01 RX ADMIN — CLOPIDOGREL BISULFATE 75 MILLIGRAM(S): 75 TABLET, FILM COATED ORAL at 11:43

## 2025-01-01 RX ADMIN — INSULIN LISPRO 6: 100 INJECTION, SOLUTION INTRAVENOUS; SUBCUTANEOUS at 05:56

## 2025-01-01 RX ADMIN — Medication 0.5 MILLIGRAM(S): at 23:18

## 2025-01-01 RX ADMIN — Medication 650 MILLIGRAM(S): at 04:56

## 2025-01-01 RX ADMIN — Medication 0.3 MG/HR: at 07:36

## 2025-01-01 RX ADMIN — INSULIN GLARGINE-YFGN 10 UNIT(S): 100 INJECTION, SOLUTION SUBCUTANEOUS at 21:19

## 2025-01-01 RX ADMIN — INSULIN LISPRO 5: 100 INJECTION, SOLUTION INTRAVENOUS; SUBCUTANEOUS at 11:35

## 2025-01-01 RX ADMIN — INSULIN LISPRO 2: 100 INJECTION, SOLUTION INTRAVENOUS; SUBCUTANEOUS at 06:49

## 2025-01-01 RX ADMIN — Medication 0.2 MILLIGRAM(S): at 09:47

## 2025-01-01 RX ADMIN — Medication 400 MILLIGRAM(S): at 00:04

## 2025-01-01 RX ADMIN — Medication 400 MILLIGRAM(S): at 17:28

## 2025-01-01 RX ADMIN — Medication 0.5 MILLIGRAM(S): at 15:29

## 2025-01-01 RX ADMIN — INSULIN LISPRO 2: 100 INJECTION, SOLUTION INTRAVENOUS; SUBCUTANEOUS at 11:37

## 2025-01-01 RX ADMIN — INSULIN LISPRO 3: 100 INJECTION, SOLUTION INTRAVENOUS; SUBCUTANEOUS at 19:29

## 2025-01-01 RX ADMIN — Medication 81 MILLIGRAM(S): at 11:37

## 2025-01-01 RX ADMIN — Medication 0.5 MILLIGRAM(S): at 02:45

## 2025-01-01 RX ADMIN — LEVETIRACETAM 420 MILLIGRAM(S): 10 INJECTION, SOLUTION INTRAVENOUS at 05:27

## 2025-01-01 RX ADMIN — Medication 0.5 MILLIGRAM(S): at 18:17

## 2025-01-01 RX ADMIN — LEVETIRACETAM 420 MILLIGRAM(S): 10 INJECTION, SOLUTION INTRAVENOUS at 06:22

## 2025-01-01 RX ADMIN — INSULIN LISPRO 3: 100 INJECTION, SOLUTION INTRAVENOUS; SUBCUTANEOUS at 21:55

## 2025-01-01 RX ADMIN — Medication 0.5 MILLIGRAM(S): at 06:51

## 2025-01-01 RX ADMIN — Medication 650 MILLIGRAM(S): at 08:34

## 2025-01-01 RX ADMIN — Medication 0.5 MILLIGRAM(S): at 14:59

## 2025-01-01 RX ADMIN — Medication 100 MILLIEQUIVALENT(S): at 12:17

## 2025-01-01 RX ADMIN — Medication 0.2 MILLIGRAM(S): at 09:17

## 2025-01-01 RX ADMIN — Medication 25 GRAM(S): at 13:08

## 2025-01-01 RX ADMIN — LEVETIRACETAM 420 MILLIGRAM(S): 10 INJECTION, SOLUTION INTRAVENOUS at 17:01

## 2025-01-01 RX ADMIN — Medication 100 MILLIEQUIVALENT(S): at 16:32

## 2025-01-01 RX ADMIN — INSULIN LISPRO 4: 100 INJECTION, SOLUTION INTRAVENOUS; SUBCUTANEOUS at 11:33

## 2025-01-01 RX ADMIN — CILOSTAZOL 100 MILLIGRAM(S): 50 TABLET ORAL at 06:02

## 2025-01-01 RX ADMIN — INSULIN LISPRO 2: 100 INJECTION, SOLUTION INTRAVENOUS; SUBCUTANEOUS at 06:06

## 2025-01-01 RX ADMIN — INSULIN GLARGINE-YFGN 12 UNIT(S): 100 INJECTION, SOLUTION SUBCUTANEOUS at 22:14

## 2025-01-01 RX ADMIN — Medication 0.5 MILLIGRAM(S): at 02:08

## 2025-01-01 RX ADMIN — Medication 650 MILLIGRAM(S): at 06:40

## 2025-01-01 RX ADMIN — LEVETIRACETAM 420 MILLIGRAM(S): 10 INJECTION, SOLUTION INTRAVENOUS at 05:01

## 2025-01-01 RX ADMIN — INSULIN LISPRO 6: 100 INJECTION, SOLUTION INTRAVENOUS; SUBCUTANEOUS at 11:44

## 2025-01-01 RX ADMIN — Medication 100 MILLIEQUIVALENT(S): at 13:29

## 2025-01-01 RX ADMIN — Medication 0.5 MILLIGRAM(S): at 12:27

## 2025-01-01 RX ADMIN — Medication 650 MILLIGRAM(S): at 18:31

## 2025-01-01 RX ADMIN — Medication 1 APPLICATION(S): at 06:23

## 2025-01-01 RX ADMIN — POTASSIUM PHOSPHATE, MONOBASIC POTASSIUM PHOSPHATE, DIBASIC INJECTION, 62.5 MILLIMOLE(S): 236; 224 SOLUTION, CONCENTRATE INTRAVENOUS at 14:12

## 2025-01-01 RX ADMIN — LEVETIRACETAM 420 MILLIGRAM(S): 10 INJECTION, SOLUTION INTRAVENOUS at 17:17

## 2025-01-01 RX ADMIN — INSULIN LISPRO 6: 100 INJECTION, SOLUTION INTRAVENOUS; SUBCUTANEOUS at 17:44

## 2025-01-01 RX ADMIN — INSULIN LISPRO 8: 100 INJECTION, SOLUTION INTRAVENOUS; SUBCUTANEOUS at 23:49

## 2025-01-01 RX ADMIN — Medication 1 APPLICATION(S): at 05:28

## 2025-01-01 RX ADMIN — INSULIN LISPRO 1: 100 INJECTION, SOLUTION INTRAVENOUS; SUBCUTANEOUS at 06:20

## 2025-01-01 RX ADMIN — INSULIN GLARGINE-YFGN 10 UNIT(S): 100 INJECTION, SOLUTION SUBCUTANEOUS at 21:53

## 2025-01-01 RX ADMIN — SODIUM CHLORIDE 500 MILLILITER(S): 9 INJECTION, SOLUTION INTRAVENOUS at 22:58

## 2025-01-01 RX ADMIN — INSULIN LISPRO 4: 100 INJECTION, SOLUTION INTRAVENOUS; SUBCUTANEOUS at 17:17

## 2025-01-01 RX ADMIN — Medication 0.5 MILLIGRAM(S): at 06:13

## 2025-01-01 RX ADMIN — CILOSTAZOL 100 MILLIGRAM(S): 50 TABLET ORAL at 17:07

## 2025-01-01 RX ADMIN — LEVETIRACETAM 400 MILLIGRAM(S): 10 INJECTION, SOLUTION INTRAVENOUS at 17:28

## 2025-01-01 RX ADMIN — SERTRALINE 25 MILLIGRAM(S): 100 TABLET, FILM COATED ORAL at 11:44

## 2025-01-01 RX ADMIN — CEFTRIAXONE 100 MILLIGRAM(S): 500 INJECTION, POWDER, FOR SOLUTION INTRAMUSCULAR; INTRAVENOUS at 17:45

## 2025-01-01 RX ADMIN — POTASSIUM CHLORIDE, DEXTROSE MONOHYDRATE AND SODIUM CHLORIDE 100 MILLILITER(S): 150; 5; 900 INJECTION, SOLUTION INTRAVENOUS at 21:18

## 2025-01-01 RX ADMIN — Medication 100 MILLIEQUIVALENT(S): at 14:49

## 2025-01-01 RX ADMIN — INSULIN LISPRO 2: 100 INJECTION, SOLUTION INTRAVENOUS; SUBCUTANEOUS at 22:13

## 2025-01-01 RX ADMIN — Medication 0.5 MILLIGRAM(S): at 10:49

## 2025-01-01 RX ADMIN — LEVETIRACETAM 420 MILLIGRAM(S): 10 INJECTION, SOLUTION INTRAVENOUS at 05:47

## 2025-01-01 RX ADMIN — Medication 25 GRAM(S): at 15:51

## 2025-01-01 RX ADMIN — ROSUVASTATIN CALCIUM 40 MILLIGRAM(S): 20 TABLET, FILM COATED ORAL at 21:53

## 2025-01-01 RX ADMIN — LEVETIRACETAM 420 MILLIGRAM(S): 10 INJECTION, SOLUTION INTRAVENOUS at 05:03

## 2025-01-01 RX ADMIN — INSULIN LISPRO 4: 100 INJECTION, SOLUTION INTRAVENOUS; SUBCUTANEOUS at 06:23

## 2025-01-01 RX ADMIN — DIAZEPAM 10 MILLIGRAM(S): 5 TABLET ORAL at 16:06

## 2025-01-01 RX ADMIN — Medication 200 GRAM(S): at 13:05

## 2025-01-01 RX ADMIN — Medication 1 APPLICATION(S): at 13:05

## 2025-01-01 RX ADMIN — LEVETIRACETAM 420 MILLIGRAM(S): 10 INJECTION, SOLUTION INTRAVENOUS at 18:17

## 2025-01-01 RX ADMIN — Medication 1000 MILLIGRAM(S): at 01:04

## 2025-01-01 RX ADMIN — INSULIN LISPRO 12: 100 INJECTION, SOLUTION INTRAVENOUS; SUBCUTANEOUS at 11:32

## 2025-01-01 RX ADMIN — SERTRALINE 25 MILLIGRAM(S): 100 TABLET, FILM COATED ORAL at 11:37

## 2025-01-01 RX ADMIN — Medication 500 MILLIGRAM(S): at 01:47

## 2025-01-01 RX ADMIN — Medication 1 APPLICATION(S): at 06:07

## 2025-01-01 RX ADMIN — Medication 100 MILLIEQUIVALENT(S): at 15:50

## 2025-01-01 RX ADMIN — POTASSIUM CHLORIDE, DEXTROSE MONOHYDRATE AND SODIUM CHLORIDE 100 MILLILITER(S): 150; 5; 900 INJECTION, SOLUTION INTRAVENOUS at 10:48

## 2025-01-01 RX ADMIN — Medication 100 MILLIEQUIVALENT(S): at 14:17

## 2025-01-01 RX ADMIN — LEVETIRACETAM 420 MILLIGRAM(S): 10 INJECTION, SOLUTION INTRAVENOUS at 19:01

## 2025-01-01 RX ADMIN — Medication 25 GRAM(S): at 06:06

## 2025-01-01 RX ADMIN — SODIUM CHLORIDE 75 MILLILITER(S): 9 INJECTION, SOLUTION INTRAVENOUS at 09:53

## 2025-01-01 RX ADMIN — Medication 1 APPLICATION(S): at 06:03

## 2025-01-01 RX ADMIN — INSULIN LISPRO 2: 100 INJECTION, SOLUTION INTRAVENOUS; SUBCUTANEOUS at 00:17

## 2025-01-01 RX ADMIN — Medication 0.5 MILLIGRAM(S): at 11:49

## 2025-01-01 RX ADMIN — POTASSIUM CHLORIDE, DEXTROSE MONOHYDRATE AND SODIUM CHLORIDE 100 MILLILITER(S): 150; 5; 900 INJECTION, SOLUTION INTRAVENOUS at 07:02

## 2025-01-01 RX ADMIN — Medication 1 PACKET(S): at 08:52

## 2025-01-01 RX ADMIN — Medication 1000 MILLILITER(S): at 10:00

## 2025-01-01 RX ADMIN — Medication 650 MILLIGRAM(S): at 20:53

## 2025-01-01 RX ADMIN — INSULIN LISPRO 5: 100 INJECTION, SOLUTION INTRAVENOUS; SUBCUTANEOUS at 17:35

## 2025-01-01 RX ADMIN — CEFEPIME 100 MILLIGRAM(S): 2 INJECTION, POWDER, FOR SOLUTION INTRAVENOUS at 08:34

## 2025-01-01 RX ADMIN — Medication 1000 MILLIGRAM(S): at 16:31

## 2025-01-01 RX ADMIN — INSULIN LISPRO 4: 100 INJECTION, SOLUTION INTRAVENOUS; SUBCUTANEOUS at 00:30

## 2025-01-01 RX ADMIN — Medication 650 MILLIGRAM(S): at 06:13

## 2025-01-01 RX ADMIN — INSULIN LISPRO 6: 100 INJECTION, SOLUTION INTRAVENOUS; SUBCUTANEOUS at 11:43

## 2025-01-01 RX ADMIN — CLOPIDOGREL BISULFATE 75 MILLIGRAM(S): 75 TABLET, FILM COATED ORAL at 11:36

## 2025-01-01 RX ADMIN — Medication 650 MILLIGRAM(S): at 04:50

## 2025-01-01 RX ADMIN — Medication 0.5 MILLIGRAM(S): at 11:57

## 2025-01-01 RX ADMIN — INSULIN LISPRO 5: 100 INJECTION, SOLUTION INTRAVENOUS; SUBCUTANEOUS at 00:16

## 2025-01-01 RX ADMIN — LEVETIRACETAM 420 MILLIGRAM(S): 10 INJECTION, SOLUTION INTRAVENOUS at 17:13

## 2025-01-01 RX ADMIN — Medication 200 MILLIGRAM(S): at 01:01

## 2025-01-01 RX ADMIN — POTASSIUM CHLORIDE, DEXTROSE MONOHYDRATE AND SODIUM CHLORIDE 100 MILLILITER(S): 150; 5; 900 INJECTION, SOLUTION INTRAVENOUS at 22:13

## 2025-01-01 RX ADMIN — Medication 650 MILLIGRAM(S): at 07:00

## 2025-01-01 RX ADMIN — Medication 650 MILLIGRAM(S): at 19:31

## 2025-01-01 RX ADMIN — Medication 1000 MILLIGRAM(S): at 06:44

## 2025-01-01 RX ADMIN — Medication 0.3 MG/HR: at 10:55

## 2025-01-01 RX ADMIN — Medication 1000 MILLIGRAM(S): at 18:46

## 2025-01-01 RX ADMIN — Medication 25 GRAM(S): at 22:26

## 2025-01-01 RX ADMIN — Medication 250 MILLIGRAM(S): at 09:09

## 2025-01-01 RX ADMIN — INSULIN LISPRO 2: 100 INJECTION, SOLUTION INTRAVENOUS; SUBCUTANEOUS at 21:19

## 2025-01-01 RX ADMIN — Medication 100 MILLILITER(S): at 15:36

## 2025-01-01 RX ADMIN — Medication 100 MILLIEQUIVALENT(S): at 10:51

## 2025-01-01 RX ADMIN — Medication 100 MILLIEQUIVALENT(S): at 09:43

## 2025-01-01 RX ADMIN — Medication 2100 MILLILITER(S): at 08:45

## 2025-01-01 RX ADMIN — Medication 650 MILLIGRAM(S): at 21:23

## 2025-01-01 RX ADMIN — CEFTRIAXONE 100 MILLIGRAM(S): 500 INJECTION, POWDER, FOR SOLUTION INTRAMUSCULAR; INTRAVENOUS at 17:33

## 2025-01-01 RX ADMIN — INSULIN LISPRO 3: 100 INJECTION, SOLUTION INTRAVENOUS; SUBCUTANEOUS at 12:17

## 2025-01-01 RX ADMIN — Medication 2100 MILLILITER(S): at 08:00

## 2025-01-01 RX ADMIN — Medication 400 MILLIGRAM(S): at 05:46

## 2025-01-01 RX ADMIN — Medication 0.3 MG/HR: at 11:25

## 2025-01-01 RX ADMIN — Medication 650 MILLIGRAM(S): at 06:22

## 2025-01-28 NOTE — H&P PST ADULT - RESPIRATORY RATE (BREATHS/MIN)
Procedure: right carpal tunnel release    1. Please keep the dressing clean, dry, and in place. Do not take it off and do not get it wet.    2. Please keep the right arm and hand elevated for the 1st 24-48 hours to prevent swelling    3. Flexion and extension of the exposed fingers is encouraged, but do not attempt to push off or lift more than 1-2 pounds with right arm or hand    4. Please limit weightbearing to the right hand to 1-2 pounds. Light activity such as brushing your teeth, using a fork, or lifting a small drink is allowed starting today.    5. Pain medication has been prescribed. Please take them as necessary    6. If there are any questions or concerns please call Dr. Grajeda's office at [phone]    7.  Follow up with Dr. Grajeda in 2 weeks      16

## 2025-01-31 ENCOUNTER — INPATIENT (INPATIENT)
Facility: HOSPITAL | Age: 75
LOS: 6 days | Discharge: HOME | DRG: 153 | End: 2025-02-07
Attending: INTERNAL MEDICINE | Admitting: INTERNAL MEDICINE
Payer: COMMERCIAL

## 2025-01-31 VITALS
HEIGHT: 69 IN | OXYGEN SATURATION: 96 % | SYSTOLIC BLOOD PRESSURE: 178 MMHG | RESPIRATION RATE: 18 BRPM | WEIGHT: 176.81 LBS | DIASTOLIC BLOOD PRESSURE: 104 MMHG | HEART RATE: 94 BPM

## 2025-01-31 DIAGNOSIS — Z98.1 ARTHRODESIS STATUS: Chronic | ICD-10-CM

## 2025-01-31 DIAGNOSIS — Z98.890 OTHER SPECIFIED POSTPROCEDURAL STATES: Chronic | ICD-10-CM

## 2025-01-31 DIAGNOSIS — Z95.1 PRESENCE OF AORTOCORONARY BYPASS GRAFT: Chronic | ICD-10-CM

## 2025-01-31 PROCEDURE — 0042T: CPT

## 2025-01-31 PROCEDURE — 70450 CT HEAD/BRAIN W/O DYE: CPT | Mod: 26,XU

## 2025-01-31 PROCEDURE — 70496 CT ANGIOGRAPHY HEAD: CPT | Mod: 26

## 2025-01-31 PROCEDURE — 70498 CT ANGIOGRAPHY NECK: CPT | Mod: 26

## 2025-01-31 PROCEDURE — 99285 EMERGENCY DEPT VISIT HI MDM: CPT | Mod: 25

## 2025-01-31 NOTE — ED ADULT TRIAGE NOTE - CODE STROKE DETAILS
Pt is From Home , brought as NOTIFICATION  For STROKE , Right sided weakness , Confused , had similar symptoms  for  previous STROKE  .

## 2025-01-31 NOTE — ED PROVIDER NOTE - PHYSICAL EXAMINATION
CONSTITUTIONAL: non-toxic, well appearing  SKIN: no rash, no petechiae.  EYES: PERRL, EOMI, pink conjunctiva, anicteric  ENT: tongue and uvular midline, no exudates, mildly dry mucous membranes  NECK: Supple; no meningismus, no JVD  CARD: RRR, no murmurs, equal radial pulses bilaterally 2+  RESP: CTAB, no respiratory distress  ABD: Soft, non-tender, non-distended, no peritoneal signs, no CVA tenderness  EXT: Normal ROM x4. No edema. No calf tenderness  NEURO: Alert, oriented to person and place. Neuro exam nonfocal, equal strength bilaterally. CN II-XII intact.   PSYCH: Cooperative, appropriate.

## 2025-01-31 NOTE — ED ADULT TRIAGE NOTE - CHIEF COMPLAINT QUOTE
BIBA  EMS  reports that  pt has  Right sided Weakness and Confusion  started 40 minutes ago . last well known 2200 pm  1/31/2025 .  .H/O stroke . Pt states   he had same symptoms with previous Stroke.  Pt is brought as NOTIFICATION BIBA  EMS  reports that  pt has  Right sided Weakness and Confusion  started 40 minutes ago . last well known 2200 pm  1/31/2025 .  .H/O stroke . Pt states   he had same symptoms with previous Stroke.  Pt is brought as NOTIFICATION .  at home  .Fs 160 in the ED

## 2025-01-31 NOTE — ED PROVIDER NOTE - PROGRESS NOTE DETAILS
[facility]-DO: CT/CTA negative for acute pathology.  Patient febrile, PCR positive for influenza A.  Patient confused, pulling out peripheral IVs and monitor leads, one-to-one observation ordered.  Discussed with hospitalist and MAR regarding admission.

## 2025-02-01 DIAGNOSIS — I25.10 ATHEROSCLEROTIC HEART DISEASE OF NATIVE CORONARY ARTERY WITHOUT ANGINA PECTORIS: ICD-10-CM

## 2025-02-01 DIAGNOSIS — I10 ESSENTIAL (PRIMARY) HYPERTENSION: ICD-10-CM

## 2025-02-01 DIAGNOSIS — Z29.9 ENCOUNTER FOR PROPHYLACTIC MEASURES, UNSPECIFIED: ICD-10-CM

## 2025-02-01 DIAGNOSIS — E78.5 HYPERLIPIDEMIA, UNSPECIFIED: ICD-10-CM

## 2025-02-01 DIAGNOSIS — J10.1 INFLUENZA DUE TO OTHER IDENTIFIED INFLUENZA VIRUS WITH OTHER RESPIRATORY MANIFESTATIONS: ICD-10-CM

## 2025-02-01 DIAGNOSIS — A41.9 SEPSIS, UNSPECIFIED ORGANISM: ICD-10-CM

## 2025-02-01 DIAGNOSIS — J11.1 INFLUENZA DUE TO UNIDENTIFIED INFLUENZA VIRUS WITH OTHER RESPIRATORY MANIFESTATIONS: ICD-10-CM

## 2025-02-01 DIAGNOSIS — G93.40 ENCEPHALOPATHY, UNSPECIFIED: ICD-10-CM

## 2025-02-01 DIAGNOSIS — D69.6 THROMBOCYTOPENIA, UNSPECIFIED: ICD-10-CM

## 2025-02-01 DIAGNOSIS — I63.9 CEREBRAL INFARCTION, UNSPECIFIED: ICD-10-CM

## 2025-02-01 DIAGNOSIS — E11.9 TYPE 2 DIABETES MELLITUS WITHOUT COMPLICATIONS: ICD-10-CM

## 2025-02-01 PROBLEM — Z95.5 PRESENCE OF CORONARY ANGIOPLASTY IMPLANT AND GRAFT: Chronic | Status: ACTIVE | Noted: 2023-07-03

## 2025-02-01 PROBLEM — I73.9 PERIPHERAL VASCULAR DISEASE, UNSPECIFIED: Chronic | Status: ACTIVE | Noted: 2023-07-03

## 2025-02-01 PROBLEM — N40.0 BENIGN PROSTATIC HYPERPLASIA WITHOUT LOWER URINARY TRACT SYMPTOMS: Chronic | Status: ACTIVE | Noted: 2023-07-03

## 2025-02-01 PROBLEM — M46.20 OSTEOMYELITIS OF VERTEBRA, SITE UNSPECIFIED: Chronic | Status: ACTIVE | Noted: 2023-07-03

## 2025-02-01 LAB
ALBUMIN SERPL ELPH-MCNC: 3 G/DL — LOW (ref 3.5–5)
ALBUMIN SERPL ELPH-MCNC: 3.6 G/DL — SIGNIFICANT CHANGE UP (ref 3.5–5)
ALP SERPL-CCNC: 79 U/L — SIGNIFICANT CHANGE UP (ref 40–120)
ALP SERPL-CCNC: 98 U/L — SIGNIFICANT CHANGE UP (ref 40–120)
ALT FLD-CCNC: 18 U/L DA — SIGNIFICANT CHANGE UP (ref 10–60)
ALT FLD-CCNC: 18 U/L DA — SIGNIFICANT CHANGE UP (ref 10–60)
ANION GAP SERPL CALC-SCNC: 5 MMOL/L — SIGNIFICANT CHANGE UP (ref 5–17)
ANION GAP SERPL CALC-SCNC: 7 MMOL/L — SIGNIFICANT CHANGE UP (ref 5–17)
ANION GAP SERPL CALC-SCNC: 9 MMOL/L — SIGNIFICANT CHANGE UP (ref 5–17)
APPEARANCE UR: CLEAR — SIGNIFICANT CHANGE UP
APTT BLD: 31.1 SEC — SIGNIFICANT CHANGE UP (ref 24.5–35.6)
AST SERPL-CCNC: 17 U/L — SIGNIFICANT CHANGE UP (ref 10–40)
AST SERPL-CCNC: 17 U/L — SIGNIFICANT CHANGE UP (ref 10–40)
BACTERIA # UR AUTO: ABNORMAL /HPF
BASOPHILS # BLD AUTO: 0.02 K/UL — SIGNIFICANT CHANGE UP (ref 0–0.2)
BASOPHILS NFR BLD AUTO: 0.3 % — SIGNIFICANT CHANGE UP (ref 0–2)
BILIRUB DIRECT SERPL-MCNC: 0.2 MG/DL — SIGNIFICANT CHANGE UP (ref 0–0.3)
BILIRUB INDIRECT FLD-MCNC: 0.3 MG/DL — SIGNIFICANT CHANGE UP (ref 0.2–1)
BILIRUB SERPL-MCNC: 0.5 MG/DL — SIGNIFICANT CHANGE UP (ref 0.2–1.2)
BILIRUB SERPL-MCNC: 0.5 MG/DL — SIGNIFICANT CHANGE UP (ref 0.2–1.2)
BILIRUB UR-MCNC: NEGATIVE — SIGNIFICANT CHANGE UP
BUN SERPL-MCNC: 13 MG/DL — SIGNIFICANT CHANGE UP (ref 7–18)
BUN SERPL-MCNC: 13 MG/DL — SIGNIFICANT CHANGE UP (ref 7–18)
BUN SERPL-MCNC: 15 MG/DL — SIGNIFICANT CHANGE UP (ref 7–18)
CALCIUM SERPL-MCNC: 8.3 MG/DL — LOW (ref 8.4–10.5)
CALCIUM SERPL-MCNC: 8.3 MG/DL — LOW (ref 8.4–10.5)
CALCIUM SERPL-MCNC: 9.1 MG/DL — SIGNIFICANT CHANGE UP (ref 8.4–10.5)
CHLORIDE SERPL-SCNC: 106 MMOL/L — SIGNIFICANT CHANGE UP (ref 96–108)
CHLORIDE SERPL-SCNC: 108 MMOL/L — SIGNIFICANT CHANGE UP (ref 96–108)
CHLORIDE SERPL-SCNC: 109 MMOL/L — HIGH (ref 96–108)
CHOLEST SERPL-MCNC: 164 MG/DL — SIGNIFICANT CHANGE UP
CO2 SERPL-SCNC: 25 MMOL/L — SIGNIFICANT CHANGE UP (ref 22–31)
CO2 SERPL-SCNC: 28 MMOL/L — SIGNIFICANT CHANGE UP (ref 22–31)
CO2 SERPL-SCNC: 28 MMOL/L — SIGNIFICANT CHANGE UP (ref 22–31)
COLOR SPEC: YELLOW — SIGNIFICANT CHANGE UP
CREAT SERPL-MCNC: 0.87 MG/DL — SIGNIFICANT CHANGE UP (ref 0.5–1.3)
CREAT SERPL-MCNC: 1 MG/DL — SIGNIFICANT CHANGE UP (ref 0.5–1.3)
CREAT SERPL-MCNC: 1.11 MG/DL — SIGNIFICANT CHANGE UP (ref 0.5–1.3)
DIFF PNL FLD: ABNORMAL
EGFR: 71 ML/MIN/1.73M2 — SIGNIFICANT CHANGE UP
EGFR: 80 ML/MIN/1.73M2 — SIGNIFICANT CHANGE UP
EGFR: 92 ML/MIN/1.73M2 — SIGNIFICANT CHANGE UP
EOSINOPHIL # BLD AUTO: 0.01 K/UL — SIGNIFICANT CHANGE UP (ref 0–0.5)
EOSINOPHIL NFR BLD AUTO: 0.1 % — SIGNIFICANT CHANGE UP (ref 0–6)
EPI CELLS # UR: PRESENT
FLUAV AG NPH QL: DETECTED
FLUBV AG NPH QL: SIGNIFICANT CHANGE UP
GLUCOSE BLDC GLUCOMTR-MCNC: 142 MG/DL — HIGH (ref 70–99)
GLUCOSE BLDC GLUCOMTR-MCNC: 189 MG/DL — HIGH (ref 70–99)
GLUCOSE BLDC GLUCOMTR-MCNC: 211 MG/DL — HIGH (ref 70–99)
GLUCOSE SERPL-MCNC: 174 MG/DL — HIGH (ref 70–99)
GLUCOSE SERPL-MCNC: 192 MG/DL — HIGH (ref 70–99)
GLUCOSE SERPL-MCNC: 219 MG/DL — HIGH (ref 70–99)
GLUCOSE UR QL: 100 MG/DL
HCT VFR BLD CALC: 41.2 % — SIGNIFICANT CHANGE UP (ref 39–50)
HCT VFR BLD CALC: 43.9 % — SIGNIFICANT CHANGE UP (ref 39–50)
HDLC SERPL-MCNC: 74 MG/DL — SIGNIFICANT CHANGE UP
HGB BLD-MCNC: 14.4 G/DL — SIGNIFICANT CHANGE UP (ref 13–17)
HGB BLD-MCNC: 15 G/DL — SIGNIFICANT CHANGE UP (ref 13–17)
IMM GRANULOCYTES NFR BLD AUTO: 0.4 % — SIGNIFICANT CHANGE UP (ref 0–0.9)
INR BLD: 1.03 RATIO — SIGNIFICANT CHANGE UP (ref 0.85–1.16)
KETONES UR-MCNC: 15 MG/DL
LACTATE SERPL-SCNC: 1.3 MMOL/L — SIGNIFICANT CHANGE UP (ref 0.7–2)
LEUKOCYTE ESTERASE UR-ACNC: NEGATIVE — SIGNIFICANT CHANGE UP
LIPID PNL WITH DIRECT LDL SERPL: 76 MG/DL — SIGNIFICANT CHANGE UP
LYMPHOCYTES # BLD AUTO: 0.6 K/UL — LOW (ref 1–3.3)
LYMPHOCYTES # BLD AUTO: 8.3 % — LOW (ref 13–44)
MAGNESIUM SERPL-MCNC: 1.8 MG/DL — SIGNIFICANT CHANGE UP (ref 1.6–2.6)
MAGNESIUM SERPL-MCNC: 1.8 MG/DL — SIGNIFICANT CHANGE UP (ref 1.6–2.6)
MAGNESIUM SERPL-MCNC: 1.9 MG/DL — SIGNIFICANT CHANGE UP (ref 1.6–2.6)
MANUAL SMEAR VERIFICATION: SIGNIFICANT CHANGE UP
MCHC RBC-ENTMCNC: 34.2 G/DL — SIGNIFICANT CHANGE UP (ref 32–36)
MCHC RBC-ENTMCNC: 34.2 PG — HIGH (ref 27–34)
MCHC RBC-ENTMCNC: 35 G/DL — SIGNIFICANT CHANGE UP (ref 32–36)
MCHC RBC-ENTMCNC: 35 PG — HIGH (ref 27–34)
MCV RBC AUTO: 100 FL — SIGNIFICANT CHANGE UP (ref 80–100)
MCV RBC AUTO: 100.2 FL — HIGH (ref 80–100)
MONOCYTES # BLD AUTO: 0.87 K/UL — SIGNIFICANT CHANGE UP (ref 0–0.9)
MONOCYTES NFR BLD AUTO: 12 % — SIGNIFICANT CHANGE UP (ref 2–14)
NEUTROPHILS # BLD AUTO: 5.69 K/UL — SIGNIFICANT CHANGE UP (ref 1.8–7.4)
NEUTROPHILS NFR BLD AUTO: 78.9 % — HIGH (ref 43–77)
NITRITE UR-MCNC: NEGATIVE — SIGNIFICANT CHANGE UP
NON HDL CHOLESTEROL: 90 MG/DL — SIGNIFICANT CHANGE UP
NRBC # BLD: 0 /100 WBCS — SIGNIFICANT CHANGE UP (ref 0–0)
NRBC # BLD: 0 /100 WBCS — SIGNIFICANT CHANGE UP (ref 0–0)
NRBC BLD-RTO: 0 /100 WBCS — SIGNIFICANT CHANGE UP (ref 0–0)
NRBC BLD-RTO: 0 /100 WBCS — SIGNIFICANT CHANGE UP (ref 0–0)
PH UR: 6.5 — SIGNIFICANT CHANGE UP (ref 5–8)
PHOSPHATE SERPL-MCNC: 2 MG/DL — LOW (ref 2.5–4.5)
PHOSPHATE SERPL-MCNC: 2.8 MG/DL — SIGNIFICANT CHANGE UP (ref 2.5–4.5)
PHOSPHATE SERPL-MCNC: 3.4 MG/DL — SIGNIFICANT CHANGE UP (ref 2.5–4.5)
PLAT MORPH BLD: NORMAL — SIGNIFICANT CHANGE UP
PLATELET # BLD AUTO: 75 K/UL — LOW (ref 150–400)
PLATELET # BLD AUTO: 88 K/UL — LOW (ref 150–400)
PLATELET COUNT - ESTIMATE: ABNORMAL
POTASSIUM SERPL-MCNC: 3.2 MMOL/L — LOW (ref 3.5–5.3)
POTASSIUM SERPL-MCNC: 3.6 MMOL/L — SIGNIFICANT CHANGE UP (ref 3.5–5.3)
POTASSIUM SERPL-MCNC: 4.4 MMOL/L — SIGNIFICANT CHANGE UP (ref 3.5–5.3)
POTASSIUM SERPL-SCNC: 3.2 MMOL/L — LOW (ref 3.5–5.3)
POTASSIUM SERPL-SCNC: 3.6 MMOL/L — SIGNIFICANT CHANGE UP (ref 3.5–5.3)
POTASSIUM SERPL-SCNC: 4.4 MMOL/L — SIGNIFICANT CHANGE UP (ref 3.5–5.3)
PROT SERPL-MCNC: 6 G/DL — SIGNIFICANT CHANGE UP (ref 6–8.3)
PROT SERPL-MCNC: 7.3 G/DL — SIGNIFICANT CHANGE UP (ref 6–8.3)
PROT UR-MCNC: 30 MG/DL
PROTHROM AB SERPL-ACNC: 11.9 SEC — SIGNIFICANT CHANGE UP (ref 9.9–13.4)
RBC # BLD: 4.11 M/UL — LOW (ref 4.2–5.8)
RBC # BLD: 4.39 M/UL — SIGNIFICANT CHANGE UP (ref 4.2–5.8)
RBC # FLD: 13.7 % — SIGNIFICANT CHANGE UP (ref 10.3–14.5)
RBC # FLD: 13.8 % — SIGNIFICANT CHANGE UP (ref 10.3–14.5)
RBC BLD AUTO: NORMAL — SIGNIFICANT CHANGE UP
RBC CASTS # UR COMP ASSIST: 3 /HPF — SIGNIFICANT CHANGE UP (ref 0–4)
RSV RNA NPH QL NAA+NON-PROBE: SIGNIFICANT CHANGE UP
SARS-COV-2 RNA SPEC QL NAA+PROBE: SIGNIFICANT CHANGE UP
SODIUM SERPL-SCNC: 141 MMOL/L — SIGNIFICANT CHANGE UP (ref 135–145)
SODIUM SERPL-SCNC: 141 MMOL/L — SIGNIFICANT CHANGE UP (ref 135–145)
SODIUM SERPL-SCNC: 143 MMOL/L — SIGNIFICANT CHANGE UP (ref 135–145)
SP GR SPEC: 1.04 — HIGH (ref 1–1.03)
TRIGL SERPL-MCNC: 76 MG/DL — SIGNIFICANT CHANGE UP
TROPONIN I, HIGH SENSITIVITY RESULT: 42.6 NG/L — SIGNIFICANT CHANGE UP
TSH SERPL-MCNC: 0.51 UU/ML — SIGNIFICANT CHANGE UP (ref 0.34–4.82)
UROBILINOGEN FLD QL: 0.2 MG/DL — SIGNIFICANT CHANGE UP (ref 0.2–1)
WBC # BLD: 6.16 K/UL — SIGNIFICANT CHANGE UP (ref 3.8–10.5)
WBC # BLD: 7.22 K/UL — SIGNIFICANT CHANGE UP (ref 3.8–10.5)
WBC # FLD AUTO: 6.16 K/UL — SIGNIFICANT CHANGE UP (ref 3.8–10.5)
WBC # FLD AUTO: 7.22 K/UL — SIGNIFICANT CHANGE UP (ref 3.8–10.5)
WBC UR QL: 0 /HPF — SIGNIFICANT CHANGE UP (ref 0–5)

## 2025-02-01 PROCEDURE — 99223 1ST HOSP IP/OBS HIGH 75: CPT

## 2025-02-01 PROCEDURE — 71045 X-RAY EXAM CHEST 1 VIEW: CPT | Mod: 26

## 2025-02-01 PROCEDURE — 93010 ELECTROCARDIOGRAM REPORT: CPT

## 2025-02-01 RX ORDER — TAMSULOSIN HYDROCHLORIDE 0.4 MG/1
0.4 CAPSULE ORAL AT BEDTIME
Refills: 0 | Status: DISCONTINUED | OUTPATIENT
Start: 2025-02-01 | End: 2025-02-07

## 2025-02-01 RX ORDER — ENOXAPARIN SODIUM 100 MG/ML
40 INJECTION SUBCUTANEOUS EVERY 24 HOURS
Refills: 0 | Status: DISCONTINUED | OUTPATIENT
Start: 2025-02-01 | End: 2025-02-07

## 2025-02-01 RX ORDER — INSULIN LISPRO 100/ML
VIAL (ML) SUBCUTANEOUS AT BEDTIME
Refills: 0 | Status: DISCONTINUED | OUTPATIENT
Start: 2025-02-01 | End: 2025-02-07

## 2025-02-01 RX ORDER — ACETAMINOPHEN 160 MG/5ML
650 SUSPENSION ORAL EVERY 6 HOURS
Refills: 0 | Status: DISCONTINUED | OUTPATIENT
Start: 2025-02-01 | End: 2025-02-07

## 2025-02-01 RX ORDER — PIPERACILLIN SODIUM AND TAZOBACTAM SODIUM 2; 250 G/50ML; MG/50ML
3.38 INJECTION, POWDER, FOR SOLUTION INTRAVENOUS ONCE
Refills: 0 | Status: COMPLETED | OUTPATIENT
Start: 2025-02-01 | End: 2025-02-01

## 2025-02-01 RX ORDER — SERTRALINE HCL 100 MG
25 TABLET ORAL DAILY
Refills: 0 | Status: DISCONTINUED | OUTPATIENT
Start: 2025-02-01 | End: 2025-02-07

## 2025-02-01 RX ORDER — ASPIRIN 81 MG/1
81 TABLET, COATED ORAL DAILY
Refills: 0 | Status: DISCONTINUED | OUTPATIENT
Start: 2025-02-01 | End: 2025-02-07

## 2025-02-01 RX ORDER — BACTERIOSTATIC SODIUM CHLORIDE 0.9 %
1000 VIAL (ML) INJECTION
Refills: 0 | Status: COMPLETED | OUTPATIENT
Start: 2025-02-01 | End: 2025-02-01

## 2025-02-01 RX ORDER — POTASSIUM PHOSPHATE, MONOBASIC POTASSIUM PHOSPHATE, DIBASIC 236; 224 MG/ML; MG/ML
15 INJECTION, SOLUTION INTRAVENOUS ONCE
Refills: 0 | Status: COMPLETED | OUTPATIENT
Start: 2025-02-01 | End: 2025-02-01

## 2025-02-01 RX ORDER — ONDANSETRON 4 MG/1
4 TABLET, ORALLY DISINTEGRATING ORAL EVERY 8 HOURS
Refills: 0 | Status: DISCONTINUED | OUTPATIENT
Start: 2025-02-01 | End: 2025-02-07

## 2025-02-01 RX ORDER — THIAMINE HCL 100 MG
100 TABLET ORAL DAILY
Refills: 0 | Status: DISCONTINUED | OUTPATIENT
Start: 2025-02-02 | End: 2025-02-03

## 2025-02-01 RX ORDER — ACETAMINOPHEN 160 MG/5ML
1000 SUSPENSION ORAL ONCE
Refills: 0 | Status: COMPLETED | OUTPATIENT
Start: 2025-02-01 | End: 2025-02-01

## 2025-02-01 RX ORDER — MECOBAL/LEVOMEFOLAT CA/B6 PHOS 2-3-35 MG
1 TABLET ORAL DAILY
Refills: 0 | Status: DISCONTINUED | OUTPATIENT
Start: 2025-02-01 | End: 2025-02-07

## 2025-02-01 RX ORDER — INSULIN LISPRO 100/ML
VIAL (ML) SUBCUTANEOUS
Refills: 0 | Status: DISCONTINUED | OUTPATIENT
Start: 2025-02-01 | End: 2025-02-07

## 2025-02-01 RX ORDER — SODIUM CHLORIDE 9 G/ML
1000 INJECTION, SOLUTION INTRAVENOUS ONCE
Refills: 0 | Status: COMPLETED | OUTPATIENT
Start: 2025-02-01 | End: 2025-02-01

## 2025-02-01 RX ORDER — ATORVASTATIN CALCIUM 80 MG/1
40 TABLET, FILM COATED ORAL AT BEDTIME
Refills: 0 | Status: DISCONTINUED | OUTPATIENT
Start: 2025-02-01 | End: 2025-02-02

## 2025-02-01 RX ORDER — SODIUM PHOSPHATE, DIBASIC, ANHYDROUS, POTASSIUM PHOSPHATE, MONOBASIC, AND SODIUM PHOSPHATE, MONOBASIC, MONOHYDRATE 852; 155; 130 MG/1; MG/1; MG/1
1 TABLET, COATED ORAL ONCE
Refills: 0 | Status: COMPLETED | OUTPATIENT
Start: 2025-02-01 | End: 2025-02-01

## 2025-02-01 RX ORDER — EZETIMIBE 10 MG
10 TABLET ORAL DAILY
Refills: 0 | Status: DISCONTINUED | OUTPATIENT
Start: 2025-02-01 | End: 2025-02-07

## 2025-02-01 RX ORDER — PIPERACILLIN SODIUM AND TAZOBACTAM SODIUM 2; 250 G/50ML; MG/50ML
3.38 INJECTION, POWDER, FOR SOLUTION INTRAVENOUS ONCE
Refills: 0 | Status: DISCONTINUED | OUTPATIENT
Start: 2025-02-01 | End: 2025-02-01

## 2025-02-01 RX ORDER — ATENOLOL 50 MG
50 TABLET ORAL DAILY
Refills: 0 | Status: DISCONTINUED | OUTPATIENT
Start: 2025-02-01 | End: 2025-02-05

## 2025-02-01 RX ORDER — CILOSTAZOL 100 MG
1 TABLET ORAL
Refills: 0 | DISCHARGE

## 2025-02-01 RX ORDER — THIAMINE HCL 100 MG
100 TABLET ORAL ONCE
Refills: 0 | Status: COMPLETED | OUTPATIENT
Start: 2025-02-01 | End: 2025-02-01

## 2025-02-01 RX ORDER — FOLIC ACID 1 MG
1 TABLET ORAL DAILY
Refills: 0 | Status: DISCONTINUED | OUTPATIENT
Start: 2025-02-01 | End: 2025-02-07

## 2025-02-01 RX ORDER — OSELTAMIVIR PHOSPHATE 75 MG/1
75 CAPSULE ORAL
Refills: 0 | Status: COMPLETED | OUTPATIENT
Start: 2025-02-01 | End: 2025-02-05

## 2025-02-01 RX ORDER — ENALAPRIL MALEATE 20 MG
10 TABLET ORAL DAILY
Refills: 0 | Status: DISCONTINUED | OUTPATIENT
Start: 2025-02-01 | End: 2025-02-04

## 2025-02-01 RX ADMIN — SODIUM PHOSPHATE, DIBASIC, ANHYDROUS, POTASSIUM PHOSPHATE, MONOBASIC, AND SODIUM PHOSPHATE, MONOBASIC, MONOHYDRATE 1 PACKET(S): 852; 155; 130 TABLET, COATED ORAL at 05:29

## 2025-02-01 RX ADMIN — ATORVASTATIN CALCIUM 40 MILLIGRAM(S): 80 TABLET, FILM COATED ORAL at 22:27

## 2025-02-01 RX ADMIN — Medication 1 MILLIGRAM(S): at 12:17

## 2025-02-01 RX ADMIN — SODIUM CHLORIDE 1000 MILLILITER(S): 9 INJECTION, SOLUTION INTRAVENOUS at 02:03

## 2025-02-01 RX ADMIN — ASPIRIN 81 MILLIGRAM(S): 81 TABLET, COATED ORAL at 12:16

## 2025-02-01 RX ADMIN — PIPERACILLIN SODIUM AND TAZOBACTAM SODIUM 200 GRAM(S): 2; 250 INJECTION, POWDER, FOR SOLUTION INTRAVENOUS at 01:24

## 2025-02-01 RX ADMIN — Medication 1 TABLET(S): at 12:16

## 2025-02-01 RX ADMIN — Medication 10 MILLIGRAM(S): at 05:36

## 2025-02-01 RX ADMIN — ENOXAPARIN SODIUM 40 MILLIGRAM(S): 100 INJECTION SUBCUTANEOUS at 05:29

## 2025-02-01 RX ADMIN — Medication 75 MILLIGRAM(S): at 12:16

## 2025-02-01 RX ADMIN — ACETAMINOPHEN 650 MILLIGRAM(S): 160 SUSPENSION ORAL at 09:42

## 2025-02-01 RX ADMIN — Medication 50 MILLIGRAM(S): at 05:28

## 2025-02-01 RX ADMIN — POTASSIUM PHOSPHATE, MONOBASIC POTASSIUM PHOSPHATE, DIBASIC 62.5 MILLIMOLE(S): 236; 224 INJECTION, SOLUTION INTRAVENOUS at 05:30

## 2025-02-01 RX ADMIN — TAMSULOSIN HYDROCHLORIDE 0.4 MILLIGRAM(S): 0.4 CAPSULE ORAL at 22:27

## 2025-02-01 RX ADMIN — OSELTAMIVIR PHOSPHATE 75 MILLIGRAM(S): 75 CAPSULE ORAL at 17:15

## 2025-02-01 RX ADMIN — ACETAMINOPHEN 400 MILLIGRAM(S): 160 SUSPENSION ORAL at 01:24

## 2025-02-01 RX ADMIN — Medication 10 MILLIGRAM(S): at 12:20

## 2025-02-01 RX ADMIN — PIPERACILLIN SODIUM AND TAZOBACTAM SODIUM 3.38 GRAM(S): 2; 250 INJECTION, POWDER, FOR SOLUTION INTRAVENOUS at 02:03

## 2025-02-01 RX ADMIN — ACETAMINOPHEN 1000 MILLIGRAM(S): 160 SUSPENSION ORAL at 02:03

## 2025-02-01 RX ADMIN — ACETAMINOPHEN 1000 MILLIGRAM(S): 160 SUSPENSION ORAL at 01:38

## 2025-02-01 RX ADMIN — SODIUM CHLORIDE 1000 MILLILITER(S): 9 INJECTION, SOLUTION INTRAVENOUS at 01:24

## 2025-02-01 RX ADMIN — ACETAMINOPHEN 650 MILLIGRAM(S): 160 SUSPENSION ORAL at 10:40

## 2025-02-01 RX ADMIN — Medication 25 MILLIGRAM(S): at 12:20

## 2025-02-01 RX ADMIN — Medication 2: at 12:16

## 2025-02-01 RX ADMIN — Medication 60 MILLILITER(S): at 05:43

## 2025-02-01 RX ADMIN — Medication 1: at 08:39

## 2025-02-01 RX ADMIN — OSELTAMIVIR PHOSPHATE 75 MILLIGRAM(S): 75 CAPSULE ORAL at 05:29

## 2025-02-01 NOTE — ED ADULT NURSE NOTE - CHIEF COMPLAINT QUOTE
BIBA  EMS  reports that  pt has  Right sided Weakness and Confusion  started 40 minutes ago . last well known 2200 pm  1/31/2025 .  .H/O stroke . Pt states   he had same symptoms with previous Stroke.  Pt is brought as NOTIFICATION .  at home  .Fs 160 in the ED

## 2025-02-01 NOTE — PHYSICAL THERAPY INITIAL EVALUATION ADULT - CRITERIA FOR SKILLED THERAPEUTIC INTERVENTIONS
KIM pending functional progress/impairments found/functional limitations in following categories/anticipated discharge recommendation

## 2025-02-01 NOTE — H&P ADULT - ASSESSMENT
75 yo M with pmhx of CVA, PAD, CAD, DM, HTN that is zane in for weakness. Patient to be admitted for acute encephalopathy due to medication vs infection vs stroke  73 yo M with pmhx of CVA, PAD, CAD, DM, HTN that is zane in for weakness. Patient to be admitted for acute encephalopathy due to medication vs infection vs stroke     PATIENT NEEDS MED REC, NO PAPERWROK OF MEDS AND PATIENT DOES NOT RECALL MEDS. MEDICATION NIFO TAKEN FROM [facility]

## 2025-02-01 NOTE — PHYSICAL THERAPY INITIAL EVALUATION ADULT - GAIT DEVIATIONS NOTED, PT EVAL
decreased lanie/increased time in double stance/decreased velocity of limb motion/decreased step length/decreased stride length

## 2025-02-01 NOTE — H&P ADULT - HISTORY OF PRESENT ILLNESS
75 yo M with pmhx of CVA, PAD, CAD, DM, HTN that is zane in for weakness. Patient at bedside a very poor historian, stating that today started feeling unwell generally for which took a medication (patient could not recall ) and per ED note took nyquil and after taking the medication patient could not recall what happened after. Per ED note after taking the medication patient started acting confused and genralized weak. Patient states that 4 days ago was with his doctor that was checking his eye and was told to go the ER. Patient went to [facility] and was treated for a stroke with medications and after was discharged home. Patient denies any N/V/D, SOB, CP. NIHS 0

## 2025-02-01 NOTE — PATIENT PROFILE ADULT - FALL HARM RISK - HARM RISK INTERVENTIONS
Assistance with ambulation/Assistance OOB with selected safe patient handling equipment/Communicate Risk of Fall with Harm to all staff/Discuss with provider need for PT consult/Monitor gait and stability/Provide patient with walking aids - walker, cane, crutches/Reinforce activity limits and safety measures with patient and family/Tailored Fall Risk Interventions/Use of alarms - bed, chair and/or voice tab/Visual Cue: Yellow wristband and red socks/Bed in lowest position, wheels locked, appropriate side rails in place/Call bell, personal items and telephone in reach/Instruct patient to call for assistance before getting out of bed or chair/Non-slip footwear when patient is out of bed/[place] to call system/Physically safe environment - no spills, clutter or unnecessary equipment/Purposeful Proactive Rounding/Room/bathroom lighting operational, light cord in reach

## 2025-02-01 NOTE — PHYSICAL THERAPY INITIAL EVALUATION ADULT - GROSSLY INTACT, SENSORY
unable to formally assess due to cognitive deficit/difficulty following directions despite multiple verbal and tactile cues

## 2025-02-01 NOTE — H&P ADULT - NSHPPHYSICALEXAM_GEN_ALL_CORE
T(C): 37.3 (02-01-25 @ 03:33), Max: 39.8 (02-01-25 @ 03:05)  HR: 103 (02-01-25 @ 03:33) (94 - 113)  BP: 169/85 (02-01-25 @ 03:33) (164/83 - 178/104)  RR: 20 (02-01-25 @ 03:33) (18 - 20)  SpO2: 95% (02-01-25 @ 03:33) (95% - 97%)    GENERAL: NAD  HEAD:  Atraumatic, Normocephalic  EYES:  conjunctiva and sclera clear, red conjunctiva in the R eye, no discharge noted, watery discharge on both eyes   NECK: Supple, No JVD, Normal thyroid  CHEST/LUNG: Clear to auscultation. Clear to percussion bilaterally; No rales, rhonchi, wheezing, or rubs  HEART: Regular rate and rhythm; No murmurs, rubs, or gallops  ABDOMEN: Soft, Nontender, Nondistended; Bowel sounds present  NERVOUS SYSTEM:  Alert & Oriented X2, no focal neurological deficit    EXTREMITIES:  2+ Peripheral Pulses, No clubbing, cyanosis, or edema  SKIN: warm dry

## 2025-02-01 NOTE — H&P ADULT - PROBLEM SELECTOR PLAN 1
-patient brought due to confusion, weakness by family members after taking benadryl for feeling unwell generally   -denies any systemic symptoms  -states that 4 days ago had a stroke treated at [facility]   -in the ED meeting sirs criteria   -patient with multiple possible etiologies for encephalopathy, unclear baseline of mentation   -will treat with tamiflu, supportive management and adress stroke per neuro   -monitoring mental status -patient brought due to confusion, weakness by family members after taking benadryl for feeling unwell generally   -denies any systemic symptoms  -states that 4 days ago had a stroke treated at [facility]   -in the ED meeting sirs criteria   -patient with multiple possible etiologies for encephalopathy, unclear baseline of mentation   -will treat with tamiflu, supportive management and address stroke per neuro   -monitoring mental status

## 2025-02-01 NOTE — CONSULT NOTE ADULT - SUBJECTIVE AND OBJECTIVE BOX
Patient is a 74y old  Male who presents with a chief complaint of AMS (01 Feb 2025 04:19)      HPI:  73 yo M with pmhx of CVA, PAD, CAD, DM, HTN that is zane in for weakness neuro called for latered mental status. Patient at bedside a very poor historian, stating that today started feeling unwell generally for which took a medication (patient could not recall ) and per ED note took nyquil and after taking the medication patient could not recall what happened after. Per ED note after taking the medication patient started acting confused and genralized weak. Patient states that 4 days ago was with his doctor that was checking his eye and was told to go the ER. Patient went to [facility] and was treated for a stroke with medications and after was discharged home. Patient denies any N/V/D, SOB, CP. NIHS 0 (01 Feb 2025 04:19)     The patient lives at home/.  The patient walks without assistance    Neurological Review of Systems:  No difficulty with language.  No vision loss or double vision.  No dizziness, vertigo or new hearing loss.  No difficulty with speech or swallowing.  No focal weakness.  No focal sensory changes.  No difficulty with balance.  No difficulty with ambulation.      MEDICATIONS  (STANDING):  aspirin  chewable 81 milliGRAM(s) Oral daily  atenolol  Tablet 50 milliGRAM(s) Oral daily  atorvastatin 40 milliGRAM(s) Oral at bedtime  clopidogrel Tablet 75 milliGRAM(s) Oral daily  enalapril 10 milliGRAM(s) Oral daily  enoxaparin Injectable 40 milliGRAM(s) SubCutaneous every 24 hours  ezetimibe 10 milliGRAM(s) Oral daily  folic acid 1 milliGRAM(s) Oral daily  insulin lispro (ADMELOG) corrective regimen sliding scale   SubCutaneous three times a day before meals  insulin lispro (ADMELOG) corrective regimen sliding scale   SubCutaneous at bedtime  multivitamin 1 Tablet(s) Oral daily  oseltamivir 75 milliGRAM(s) Oral two times a day  sertraline 25 milliGRAM(s) Oral daily  sodium chloride 0.9%. 1000 milliLiter(s) (60 mL/Hr) IV Continuous <Continuous>  tamsulosin 0.4 milliGRAM(s) Oral at bedtime  thiamine 100 milliGRAM(s) Oral daily    MEDICATIONS  (PRN):  acetaminophen     Tablet .. 650 milliGRAM(s) Oral every 6 hours PRN Temp greater or equal to 38C (100.4F), Mild Pain (1 - 3)  LORazepam   Injectable 1 milliGRAM(s) IV Push every 2 hours PRN CIWA-Ar score increase by 2 points and a total score of 7 or less  ondansetron Injectable 4 milliGRAM(s) IV Push every 8 hours PRN Nausea and/or Vomiting    Allergies    No Known Allergies    Intolerances      PAST MEDICAL & SURGICAL HISTORY:  Coronary artery disease  s/p CABG      Cerebrovascular accident (CVA) with involvement of right side of body      BPH (benign prostatic hyperplasia)      Stented coronary artery      PVD (peripheral vascular disease)      HLD (hyperlipidemia)      HTN (hypertension)      Vertebral osteomyelitis      Thrombocytopenia      History of coronary artery bypass surgery      S/P femoral-tibial bypass      History of fusion of cervical spine        FAMILY HISTORY:  Family history of heart disease (Mother)    FH: hypertension (Mother)    FHx: prostate cancer (Father)      SOCIAL HISTORY: non smoker    Review of Systems:  Constitutional: + generalized weakness. No fevers or chills.                    Eyes, Ears, Mouth, Throat: No vision loss   Respiratory: No  cough.                                Cardiovascular: No chest pain  Gastrointestinal: No nausea or vomiting.                                         Genitourinary: No  burning on urination.  Musculoskeletal: No joint pain.                                                           Dermatologic: No rash.  Neurological: as per HPI                                                                      Psychiatric: No behavioral problems.  Endocrine: No known hypoglycemia.               Hematologic/Lymphatic: No easy bleeding.    O:  Vital Signs Last 24 Hrs  T(C): 36.6 (01 Feb 2025 16:19), Max: 39.8 (01 Feb 2025 03:05)  T(F): 97.9 (01 Feb 2025 16:19), Max: 103.7 (01 Feb 2025 03:05)  HR: 92 (01 Feb 2025 16:19) (81 - 113)  BP: 151/74 (01 Feb 2025 16:19) (133/66 - 178/104)  BP(mean): 107 (01 Feb 2025 03:33) (107 - 107)  RR: 20 (01 Feb 2025 16:19) (18 - 24)  SpO2: 99% (01 Feb 2025 16:19) (92% - 99%)    Parameters below as of 01 Feb 2025 16:19  Patient On (Oxygen Delivery Method): room air        General Exam:   General appearance: No acute distress                 Cardiovascular: Pedal dorsalis pulses intact bilaterally    Neurological Exam:        TOTAL NIHSS       __0______    MRS Score:  ____0_  (MRS Scoring.  No symptoms at all: 0;   No significant disability despite symptoms; able to carry out all usual duties and activities: +1;  Slight disability; unable to carry out all previous activities, but able to look after own affairs without assistance: +2;  Moderate disability; requiring some help, but able to walk without assistance: +3;  Moderately severe disability; unable to walk and attend to bodily needs without assistance: +4;   Severe disability; bedridden, incontinent and requiring constant nursing care and attention:  +5;  Dead: +6)    Mental Status: Orientated to self, date and place.  Attention intact.  No dysarthria, aphasia or neglect.  Knowledge intact.  Registration intact.  Short and long term memory grossly intact.      Cranial Nerves: CN I - not tested.  PERRL, EOMI, VFF, no nystagmus or diplopia.  No APD.  Fundi not visualized bilaterally.  CN V1-3 intact to light touch.  No facial asymmetry.  Hearing intact to finger rub bilaterally.  Tongue, uvula and palate midline.  Sternocleidomastoid and Trapezius intact bilaterally.    Motor:   Tone: normal.                  Strength intact throughout  No pronator drift bilaterally                      No dysmetria on finger-nose-finger or heel-shin-heel  No truncal ataxia.  No resting, postural or action tremor.  No myoclonus.    Sensation: intact to light touch    Deep Tendon Reflexes: 1+ bilateral biceps, triceps, brachioradialis, knee and ankle  Toes flexor bilaterally    Gait: pt declines    Other:      LABS:                        14.4   6.16  )-----------( 75       ( 01 Feb 2025 05:00 )             41.2     02-01    141  |  108  |  13  ----------------------------<  192[H]  3.2[L]   |  28  |  0.87    Ca    8.3[L]      01 Feb 2025 12:53  Phos  2.8     02-01  Mg     1.8     02-01    TPro  6.0  /  Alb  3.0[L]  /  TBili  0.5  /  DBili  0.2  /  AST  17  /  ALT  18  /  AlkPhos  79  02-01    PT/INR - ( 31 Jan 2025 23:28 )   PT: 11.9 sec;   INR: 1.03 ratio         PTT - ( 31 Jan 2025 23:28 )  PTT:31.1 sec  Urinalysis Basic - ( 01 Feb 2025 12:53 )    Color: x / Appearance: x / SG: x / pH: x  Gluc: 192 mg/dL / Ketone: x  / Bili: x / Urobili: x   Blood: x / Protein: x / Nitrite: x   Leuk Esterase: x / RBC: x / WBC x   Sq Epi: x / Non Sq Epi: x / Bacteria: x      LDL  HbA1C    RADIOLOGY & ADDITIONAL STUDIES:    EKG: < from: 12 Lead ECG (07.30.18 @ 22:10) >  Ventricular Rate 58 BPM    Atrial Rate 58 BPM    P-R Interval 200 ms    QRS Duration 130 ms    Q-T Interval 492 ms    QTC Calculation(Bezet) 482 ms    P Axis 56 degrees    R Axis 75 degrees    T Axis 98 degrees    Diagnosis Line Sinus bradycardia  Right bundle branch block  Inferior infarct , age undetermined  T wave abnormality, consider lateral ischemia  Abnormal ECG    Confirmed by AAMIR MCCARTNEY MD ([id number]) on 7/30/2018 10:59:08 PM    < end of copied text >  < from: CT Angio Neck Stroke Protocol w/ IV Cont (01.31.25 @ 23:59) >    CTA NECK:  High-grade atherosclerotic stenosis involving the origin of the right   internal carotid artery, (roughly 90%)    Intermittent areas of moderate atherosclerotic narrowing involving the V4   segment of the distal left vertebral artery.    No evidence of arterial dissection.      CTA HEAD:  Patent intracranial circulation without flow limiting stenosis.    < end of copied text >

## 2025-02-01 NOTE — H&P ADULT - NSHPREVIEWOFSYSTEMS_GEN_ALL_CORE
- CONSTITUTIONAL: weakness   - HEENT: Denies changes in vision and hearing.  - RESPIRATORY: Denies SOB and cough.  - CV: Denies chest pain and palpitations  - GI: Denies abdominal pain, nausea, vomiting and diarrhea.  - : Denies dysuria and urinary frequency.  - SKIN: Denies rash and pruritus.  - NEUROLOGICAL: Denies headache and syncope.  - PSYCHIATRIC: Denies recent changes in mood. Denies anxiety and depression.

## 2025-02-01 NOTE — PHYSICAL THERAPY INITIAL EVALUATION ADULT - NSPTDISCHREC_GEN_A_CORE
D/C recommendation will be changed pending functional progress and tolerance to PT activities-rehab team will closely monitor progress/Sub-acute Rehab

## 2025-02-01 NOTE — ED ADULT NURSE NOTE - NSFALLHARMRISKINTERV_ED_ALL_ED
Assistance OOB with selected safe patient handling equipment if applicable/Communicate risk of Fall with Harm to all staff, patient, and family/Monitor for mental status changes and reorient to person, place, and time, as needed/Move patient closer to nursing station/within visual sight of ED staff/Provide visual cue: red socks, yellow wristband, yellow gown, etc/Reinforce activity limits and safety measures with patient and family/Toileting schedule using arm’s reach rule for commode and bathroom/Use of alarms - bed, stretcher, chair and/or video monitoring/Bed in lowest position, wheels locked, appropriate side rails in place/Call bell, personal items and telephone in reach/Instruct patient to call for assistance before getting out of bed/chair/stretcher/Non-slip footwear applied when patient is off stretcher/[place] to call system/Physically safe environment - no spills, clutter or unnecessary equipment/Purposeful Proactive Rounding/Room/bathroom lighting operational, light cord in reach

## 2025-02-01 NOTE — PHYSICAL THERAPY INITIAL EVALUATION ADULT - MANUAL MUSCLE TESTING RESULTS, REHAB EVAL
-unable to formally assess due to confusion; difficulty following directions despite cues-as observed-both UE/LE grossly 3/5

## 2025-02-01 NOTE — CHART NOTE - NSCHARTNOTEFT_GEN_A_CORE
Family contact note and meds reconciliation:     Spoke to pt's wife Virginia Sheppard tel [phone] and pt's son Diego tel [phone] in preferred language   MLP is bilingual in family/patient language Polish   Meds reconciliation done with family list and pharmacist [facility] (on file)  Per [facility] pt's PCP is Marium Vargas tel [phone] and last time meds were prescribed in November 2024   Family states pt is non-complaint with prescribed meds, PCP or specialty medical visits Family contact note and meds reconciliation:     Spoke to pt's wife Virginia Sheppard tel [phone] and pt's son Diego tel [phone] in preferred language   MLP is bilingual in family/patient language Polish   Meds reconciliation done with family list and pharmacist [facility] (on file)  Per [facility] pt's PCP is Marium Gurrola tel [phone] and last time meds were prescribed in November 2024   Family states pt is non-complaint with prescribed meds, PCP or specialty medical visits

## 2025-02-01 NOTE — CHART NOTE - NSCHARTNOTEFT_GEN_A_CORE
Patient was seen and examined with MARIO Hough at bedside. Patient is predominantly Polish speaking but understands English. Reports he is in hospital, "people" brought him to hospital but does not know why she is in hospital.   Trying to climb out of bed, Patient was seen and examined with MARIO Hough at bedside. Patient is predominantly Polish speaking but understands English. Reports he is in hospital, "people" brought him to hospital but does not know why he is in hospital.   Trying to climb out of bed, but easily reorientated. Reports he drinks approximately 1 bottle of wine every day and last drink was two days ago.     ICU Vital Signs Last 24 Hrs  T(C): 36.5 (01 Feb 2025 11:47), Max: 39.8 (01 Feb 2025 03:05)  T(F): 97.7 (01 Feb 2025 11:47), Max: 103.7 (01 Feb 2025 03:05)  HR: 83 (01 Feb 2025 11:47) (83 - 113)  BP: 133/66 (01 Feb 2025 11:47) (133/66 - 178/104)  BP(mean): 107 (01 Feb 2025 03:33) (107 - 107)  ABP: --  ABP(mean): --  RR: 21 (01 Feb 2025 11:47) (18 - 24)  SpO2: 92% (01 Feb 2025 11:47) (92% - 97%)    O2 Parameters below as of 01 Feb 2025 11:47  Patient On (Oxygen Delivery Method): room air    P/E:  NAD, flushed  AAOx2, right arm mild pronator drift, no other focal deficit   CTABL  S1S2 WNL  Abd soft, non tender, BS present   BLLE no edema or calf tenderness     Labs noted                          14.4   6.16  )-----------( 75       ( 01 Feb 2025 05:00 )             41.2   02-01    141  |  108  |  13  ----------------------------<  192[H]  3.2[L]   |  28  |  0.87    Ca    8.3[L]      01 Feb 2025 12:53  Phos  2.8     02-01  Mg     1.8     02-01    TPro  6.0  /  Alb  3.0[L]  /  TBili  0.5  /  DBili  0.2  /  AST  17  /  ALT  18  /  AlkPhos  79  02-01    A/P:  Acute encephalopathy, possibly multifactorial from medication side effect while drinking alcohol, Influenza, recent CVA, alcohol withdrawal   Thrombocytopenia   Influenza   Hypokalemia   Hypoalbuminemia   PAD  CAD  DM  HTN    Plan:   Cont Tamiflu  Avoid anticholinergics, benzodiazepines, limit lines/tubes/tethers/restraints, encourage frequent reorientation/reassurance by staff, encourage good sleep hygiene, adequate lighting  Start PRN CIWA protocol  IVF  IV Thiamine and folic acid   Neuro consult   MRI brain   Cont aspirin, plavix and statin   Monitor platelet count   ISS   Cont home meds for BP  Tamsulosin for BPH  Aspiration, fall and seizure precaution  Lovenox for DVT ppx   Discussed with RN and NP at bedside

## 2025-02-01 NOTE — H&P ADULT - ATTENDING COMMENTS
Vital Signs Last 24 Hrs  T(C): 37.3 (01 Feb 2025 03:33), Max: 39.8 (01 Feb 2025 03:05)  T(F): 99.1 (01 Feb 2025 03:33), Max: 103.7 (01 Feb 2025 03:05)  HR: 103 (01 Feb 2025 03:33) (94 - 113)  BP: 169/85 (01 Feb 2025 03:33) (164/83 - 178/104)  BP(mean): 107 (01 Feb 2025 03:33) (107 - 107)  RR: 20 (01 Feb 2025 03:33) (18 - 20)  SpO2: 95% (01 Feb 2025 03:33) (95% - 97%)  Parameters below as of 01 Feb 2025 03:33  Patient On (Oxygen Delivery Method): room air    Labs reviewed  wbc 7.2   PLT 88    Phos low    UA   spgr  high   some ketone    RVP - Flu A    CXR - grossly unremarkable on my independent assessment      CT head  No acute finding     CTA Neck  High-grade atherosclerotic stenosis involving the origin of the right internal carotid artery, (roughly 90%)  Intermittent areas of moderate atherosclerotic narrowing involving the V4 segment of the distal left vertebral artery.  No evidence of arterial dissection.    CTA HEAD:  Patent intracranial circulation without flow limiting stenosis.    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 AMS is also associated with worsening non-focal weakness and inability to stand  He was brought into the ED where stroke work up was done considering his medical hx.   Clinical exam and Stroke work up were unremarkable with NIHSS of 0.  ---> Acute encephalopathy likely related to NyQUIL use with reported episodes of confusion especially in the elderly   ---> With fevers, viral symptoms and +ve RVP --> patient will be managed for acute influenza A infection with physical debility     Plan   Admit to medicine   Sepsis work up  Supportive care  Tamiflu protocol   Gentle IVF hydration  Phos repletion   fall precaution   PT eval when able   Monitor closely   IF no improvement in mental status, r/o other causes of acute encephalopathy  Med reconciliation Vital Signs Last 24 Hrs  T(C): 37.3 (01 Feb 2025 03:33), Max: 39.8 (01 Feb 2025 03:05)  T(F): 99.1 (01 Feb 2025 03:33), Max: 103.7 (01 Feb 2025 03:05)  HR: 103 (01 Feb 2025 03:33) (94 - 113)  BP: 169/85 (01 Feb 2025 03:33) (164/83 - 178/104)  BP(mean): 107 (01 Feb 2025 03:33) (107 - 107)  RR: 20 (01 Feb 2025 03:33) (18 - 20)  SpO2: 95% (01 Feb 2025 03:33) (95% - 97%)  Parameters below as of 01 Feb 2025 03:33  Patient On (Oxygen Delivery Method): room air    Labs reviewed  wbc 7.2   PLT 88    Phos low    UA   spgr  high   some ketone    RVP - Flu A    CXR - grossly unremarkable on my independent assessment      CT head  No acute finding     CTA Neck  High-grade atherosclerotic stenosis involving the origin of the right internal carotid artery, (roughly 90%)  Intermittent areas of moderate atherosclerotic narrowing involving the V4 segment of the distal left vertebral artery.  No evidence of arterial dissection.    CTA HEAD:  Patent intracranial circulation without flow limiting stenosis.    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 AMS is also associated with worsening non-focal weakness and inability to stand  He was brought into the ED where stroke work up was done considering his medical hx.   Clinical exam and Stroke work up were unremarkable with NIHSS of 0. Neurology consult and MRI brain  ---> Acute encephalopathy likely related to NyQUIL use with reported episodes of confusion especially in the elderly   ---> With fevers, viral symptoms and +ve RVP --> patient will be managed for acute influenza A infection with physical debility     Plan   Admit to tele  Sepsis work up  Supportive care  Tamiflu protocol   Gentle IVF hydration  Phos repletion   fall precaution   PT eval when able   Monitor closely   Neurology consult   MRI brain  IF no improvement in mental status, r/o other causes of acute encephalopathy  Med reconciliation

## 2025-02-01 NOTE — H&P ADULT - PROBLEM SELECTOR PLAN 2
-patient brought due to confusion, weakness by family members after taking benadryl for feeling unwell generally  -in the ED meeting sirs criteria  -in the ED influenza +  -UA negative   -CXR on wet read no clear infiltrate however increased vascularity in the RLL   -lactate wnl  -will start tamiflu  -f/u blood cultures

## 2025-02-01 NOTE — ED ADULT NURSE NOTE - SUICIDE SCREENING QUESTION 2
Cough and cold symptoms for 5 days and crying with right ear pain last night.  Last ibuprofen at 0645 as well as last night   No

## 2025-02-01 NOTE — H&P ADULT - PROBLEM/PLAN-9
DISPLAY PLAN FREE TEXT Referred To Asc For Closure Text (Leave Blank If You Do Not Want): After obtaining clear surgical margins the patient was sent to an ASC for surgical repair.  The patient understands they will receive post-surgical care and follow-up from the ASC physician.

## 2025-02-01 NOTE — ED ADULT NURSE NOTE - OBJECTIVE STATEMENT
family member state that father was confused and "not making sense" notification code stroke activated. when arrived to hospital NIHSS negative.

## 2025-02-01 NOTE — H&P ADULT - PROBLEM SELECTOR PLAN 3
-states that 4 days ago had a stroke treated at [facility]   -in the ED CT showing Bihemispheric areas of increased Tmax most pronounced along the region of the right brachium pontis and right cerebellum. No decreased CBF or CBV appreciated. High-grade atherosclerotic stenosis involving the origin of the right  internal carotid artery, (roughly 90%)  -NIHS at bedside 0  -will c/w asa/plavix and statin since no signs of hemorrhage   -tele monitoring   -MR head   -neuro consult

## 2025-02-01 NOTE — ED ADULT NURSE NOTE - ED STAT RN HANDOFF DETAILS
Patient states she does not need them, if she does she will call back  assumed care at 7:10am /pt is aaox3 /no acute distress noted/pt admitted to tele /normal sinu rthym noted /safety [id number] /clean the pt /fall risk /bed alarm /vitals checked and recorded /pt left from er to floor /

## 2025-02-01 NOTE — H&P ADULT - PROBLEM SELECTOR PLAN 5
noted to have thormbocytopenia to 88  -unknown baseline   -source unclear of why  -patient did not respond to alcohol intake   -no signs of active bleeding   -f/u cbc

## 2025-02-02 DIAGNOSIS — Z78.9 OTHER SPECIFIED HEALTH STATUS: ICD-10-CM

## 2025-02-02 LAB
A1C WITH ESTIMATED AVERAGE GLUCOSE RESULT: 7.1 % — HIGH (ref 4–5.6)
ANION GAP SERPL CALC-SCNC: 5 MMOL/L — SIGNIFICANT CHANGE UP (ref 5–17)
BUN SERPL-MCNC: 14 MG/DL — SIGNIFICANT CHANGE UP (ref 7–18)
CALCIUM SERPL-MCNC: 8.6 MG/DL — SIGNIFICANT CHANGE UP (ref 8.4–10.5)
CHLORIDE SERPL-SCNC: 108 MMOL/L — SIGNIFICANT CHANGE UP (ref 96–108)
CO2 SERPL-SCNC: 27 MMOL/L — SIGNIFICANT CHANGE UP (ref 22–31)
CREAT SERPL-MCNC: 0.75 MG/DL — SIGNIFICANT CHANGE UP (ref 0.5–1.3)
EGFR: 96 ML/MIN/1.73M2 — SIGNIFICANT CHANGE UP
ESTIMATED AVERAGE GLUCOSE: 157 MG/DL — HIGH (ref 68–114)
GLUCOSE BLDC GLUCOMTR-MCNC: 138 MG/DL — HIGH (ref 70–99)
GLUCOSE BLDC GLUCOMTR-MCNC: 139 MG/DL — HIGH (ref 70–99)
GLUCOSE BLDC GLUCOMTR-MCNC: 154 MG/DL — HIGH (ref 70–99)
GLUCOSE SERPL-MCNC: 155 MG/DL — HIGH (ref 70–99)
HCT VFR BLD CALC: 39.7 % — SIGNIFICANT CHANGE UP (ref 39–50)
HGB BLD-MCNC: 14 G/DL — SIGNIFICANT CHANGE UP (ref 13–17)
MAGNESIUM SERPL-MCNC: 2.2 MG/DL — SIGNIFICANT CHANGE UP (ref 1.6–2.6)
MCHC RBC-ENTMCNC: 33.8 PG — SIGNIFICANT CHANGE UP (ref 27–34)
MCHC RBC-ENTMCNC: 35.3 G/DL — SIGNIFICANT CHANGE UP (ref 32–36)
MCV RBC AUTO: 95.9 FL — SIGNIFICANT CHANGE UP (ref 80–100)
NRBC # BLD: 0 /100 WBCS — SIGNIFICANT CHANGE UP (ref 0–0)
NRBC BLD-RTO: 0 /100 WBCS — SIGNIFICANT CHANGE UP (ref 0–0)
PHOSPHATE SERPL-MCNC: 2.7 MG/DL — SIGNIFICANT CHANGE UP (ref 2.5–4.5)
PLATELET # BLD AUTO: 74 K/UL — LOW (ref 150–400)
POTASSIUM SERPL-MCNC: 3.3 MMOL/L — LOW (ref 3.5–5.3)
POTASSIUM SERPL-SCNC: 3.3 MMOL/L — LOW (ref 3.5–5.3)
RBC # BLD: 4.14 M/UL — LOW (ref 4.2–5.8)
RBC # FLD: 13.7 % — SIGNIFICANT CHANGE UP (ref 10.3–14.5)
SODIUM SERPL-SCNC: 140 MMOL/L — SIGNIFICANT CHANGE UP (ref 135–145)
WBC # BLD: 5.26 K/UL — SIGNIFICANT CHANGE UP (ref 3.8–10.5)
WBC # FLD AUTO: 5.26 K/UL — SIGNIFICANT CHANGE UP (ref 3.8–10.5)

## 2025-02-02 PROCEDURE — 99233 SBSQ HOSP IP/OBS HIGH 50: CPT | Mod: GC

## 2025-02-02 RX ORDER — POTASSIUM CHLORIDE 750 MG/1
40 TABLET, EXTENDED RELEASE ORAL ONCE
Refills: 0 | Status: COMPLETED | OUTPATIENT
Start: 2025-02-02 | End: 2025-02-02

## 2025-02-02 RX ORDER — ATORVASTATIN CALCIUM 80 MG/1
80 TABLET, FILM COATED ORAL AT BEDTIME
Refills: 0 | Status: DISCONTINUED | OUTPATIENT
Start: 2025-02-02 | End: 2025-02-07

## 2025-02-02 RX ADMIN — Medication 1 MILLIGRAM(S): at 22:14

## 2025-02-02 RX ADMIN — Medication 1 MILLIGRAM(S): at 00:57

## 2025-02-02 RX ADMIN — Medication 10 MILLIGRAM(S): at 05:25

## 2025-02-02 RX ADMIN — POTASSIUM CHLORIDE 40 MILLIEQUIVALENT(S): 750 TABLET, EXTENDED RELEASE ORAL at 09:45

## 2025-02-02 RX ADMIN — Medication 1 MILLIGRAM(S): at 20:28

## 2025-02-02 RX ADMIN — Medication 1: at 08:31

## 2025-02-02 RX ADMIN — Medication 50 MILLIGRAM(S): at 05:25

## 2025-02-02 RX ADMIN — Medication 1 TABLET(S): at 11:46

## 2025-02-02 RX ADMIN — ENOXAPARIN SODIUM 40 MILLIGRAM(S): 100 INJECTION SUBCUTANEOUS at 05:26

## 2025-02-02 RX ADMIN — Medication 1 DROP(S): at 21:19

## 2025-02-02 RX ADMIN — Medication 75 MILLIGRAM(S): at 11:46

## 2025-02-02 RX ADMIN — TAMSULOSIN HYDROCHLORIDE 0.4 MILLIGRAM(S): 0.4 CAPSULE ORAL at 21:18

## 2025-02-02 RX ADMIN — Medication 25 MILLIGRAM(S): at 11:46

## 2025-02-02 RX ADMIN — Medication 1 MILLIGRAM(S): at 11:46

## 2025-02-02 RX ADMIN — Medication 1 DROP(S): at 13:07

## 2025-02-02 RX ADMIN — OSELTAMIVIR PHOSPHATE 75 MILLIGRAM(S): 75 CAPSULE ORAL at 17:45

## 2025-02-02 RX ADMIN — Medication 100 MILLIGRAM(S): at 11:46

## 2025-02-02 RX ADMIN — ASPIRIN 81 MILLIGRAM(S): 81 TABLET, COATED ORAL at 11:46

## 2025-02-02 RX ADMIN — Medication 10 MILLIGRAM(S): at 11:46

## 2025-02-02 RX ADMIN — ATORVASTATIN CALCIUM 80 MILLIGRAM(S): 80 TABLET, FILM COATED ORAL at 21:19

## 2025-02-02 RX ADMIN — OSELTAMIVIR PHOSPHATE 75 MILLIGRAM(S): 75 CAPSULE ORAL at 05:26

## 2025-02-02 NOTE — PROGRESS NOTE ADULT - SUBJECTIVE AND OBJECTIVE BOX
PGY-1 Progress Note discussed with attending    INTERVAL HPI/OVERNIGHT EVENTS:   MEDICATIONS  (STANDING):  aspirin  chewable 81 milliGRAM(s) Oral daily  atenolol  Tablet 50 milliGRAM(s) Oral daily  atorvastatin 40 milliGRAM(s) Oral at bedtime  clopidogrel Tablet 75 milliGRAM(s) Oral daily  enalapril 10 milliGRAM(s) Oral daily  enoxaparin Injectable 40 milliGRAM(s) SubCutaneous every 24 hours  ezetimibe 10 milliGRAM(s) Oral daily  folic acid 1 milliGRAM(s) Oral daily  influenza  Vaccine (HIGH DOSE) 0.5 milliLiter(s) IntraMuscular once  insulin lispro (ADMELOG) corrective regimen sliding scale   SubCutaneous three times a day before meals  insulin lispro (ADMELOG) corrective regimen sliding scale   SubCutaneous at bedtime  multivitamin 1 Tablet(s) Oral daily  oseltamivir 75 milliGRAM(s) Oral two times a day  potassium chloride   Powder 40 milliEquivalent(s) Oral once  sertraline 25 milliGRAM(s) Oral daily  tamsulosin 0.4 milliGRAM(s) Oral at bedtime  thiamine 100 milliGRAM(s) Oral daily    MEDICATIONS  (PRN):  acetaminophen     Tablet .. 650 milliGRAM(s) Oral every 6 hours PRN Temp greater or equal to 38C (100.4F), Mild Pain (1 - 3)  LORazepam   Injectable 1 milliGRAM(s) IV Push every 2 hours PRN CIWA-Ar score increase by 2 points and a total score of 7 or less  ondansetron Injectable 4 milliGRAM(s) IV Push every 8 hours PRN Nausea and/or Vomiting      REVIEW OF SYSTEMS:  CONSTITUTIONAL: No fever, weight loss, or fatigue  RESPIRATORY: No cough, wheezing, chills or hemoptysis; No shortness of breath  CARDIOVASCULAR: No chest pain, palpitations, dizziness, or leg swelling  GASTROINTESTINAL: No abdominal pain. No nausea, vomiting, or hematemesis; No diarrhea or constipation. No melena or hematochezia.  GENITOURINARY: No dysuria or hematuria, urinary frequency  NEUROLOGICAL: No headaches, memory loss, loss of strength, numbness, or tremors  SKIN: No itching, burning, rashes, or lesions     Vital Signs Last 24 Hrs  T(C): 36.8 (02 Feb 2025 08:35), Max: 38.3 (01 Feb 2025 09:19)  T(F): 98.2 (02 Feb 2025 08:35), Max: 100.9 (01 Feb 2025 09:19)  HR: 69 (02 Feb 2025 08:35) (69 - 99)  BP: 167/88 (02 Feb 2025 08:35) (133/66 - 218/115)  BP(mean): --  RR: 18 (02 Feb 2025 08:35) (18 - 21)  SpO2: 96% (02 Feb 2025 08:35) (92% - 100%)    Parameters below as of 02 Feb 2025 08:35  Patient On (Oxygen Delivery Method): room air        PHYSICAL EXAMINATION:  GENERAL: NAD, well built  HEAD:  Atraumatic, Normocephalic  EYES:  conjunctiva and sclera clear  NECK: Supple, No JVD, Normal thyroid  CHEST/LUNG: Clear to auscultation. Clear to percussion bilaterally; No rales, rhonchi, wheezing, or rubs  HEART: Regular rate and rhythm; No murmurs, rubs, or gallops  ABDOMEN: Soft, Nontender, Nondistended; Bowel sounds present  NERVOUS SYSTEM:  Alert & Oriented X3,    EXTREMITIES:  2+ Peripheral Pulses, No clubbing, cyanosis, or edema  SKIN: warm dry                          14.0   5.26  )-----------( 74       ( 02 Feb 2025 06:15 )             39.7     02-02    140  |  108  |  14  ----------------------------<  155[H]  3.3[L]   |  27  |  0.75    Ca    8.6      02 Feb 2025 06:15  Phos  2.7     02-02  Mg     2.2     02-02    TPro  6.0  /  Alb  3.0[L]  /  TBili  0.5  /  DBili  0.2  /  AST  17  /  ALT  18  /  AlkPhos  79  02-01    LIVER FUNCTIONS - ( 01 Feb 2025 12:53 )  Alb: 3.0 g/dL / Pro: 6.0 g/dL / ALK PHOS: 79 U/L / ALT: 18 U/L DA / AST: 17 U/L / GGT: x               PT/INR - ( 31 Jan 2025 23:28 )   PT: 11.9 sec;   INR: 1.03 ratio         PTT - ( 31 Jan 2025 23:28 )  PTT:31.1 sec    CAPILLARY BLOOD GLUCOSE      RADIOLOGY & ADDITIONAL TESTS:                   PGY-1 Progress Note discussed with attending    INTERVAL HPI/OVERNIGHT EVENTS: No overnight events. Patient had 1 large BM overnight. Denies any complaints     MEDICATIONS  (STANDING):  aspirin  chewable 81 milliGRAM(s) Oral daily  atenolol  Tablet 50 milliGRAM(s) Oral daily  atorvastatin 40 milliGRAM(s) Oral at bedtime  clopidogrel Tablet 75 milliGRAM(s) Oral daily  enalapril 10 milliGRAM(s) Oral daily  enoxaparin Injectable 40 milliGRAM(s) SubCutaneous every 24 hours  ezetimibe 10 milliGRAM(s) Oral daily  folic acid 1 milliGRAM(s) Oral daily  influenza  Vaccine (HIGH DOSE) 0.5 milliLiter(s) IntraMuscular once  insulin lispro (ADMELOG) corrective regimen sliding scale   SubCutaneous three times a day before meals  insulin lispro (ADMELOG) corrective regimen sliding scale   SubCutaneous at bedtime  multivitamin 1 Tablet(s) Oral daily  oseltamivir 75 milliGRAM(s) Oral two times a day  potassium chloride   Powder 40 milliEquivalent(s) Oral once  sertraline 25 milliGRAM(s) Oral daily  tamsulosin 0.4 milliGRAM(s) Oral at bedtime  thiamine 100 milliGRAM(s) Oral daily    MEDICATIONS  (PRN):  acetaminophen     Tablet .. 650 milliGRAM(s) Oral every 6 hours PRN Temp greater or equal to 38C (100.4F), Mild Pain (1 - 3)  LORazepam   Injectable 1 milliGRAM(s) IV Push every 2 hours PRN CIWA-Ar score increase by 2 points and a total score of 7 or less  ondansetron Injectable 4 milliGRAM(s) IV Push every 8 hours PRN Nausea and/or Vomiting      REVIEW OF SYSTEMS:  CONSTITUTIONAL: No fever, weight loss, or fatigue  RESPIRATORY: No cough, wheezing, chills or hemoptysis; No shortness of breath  CARDIOVASCULAR: No chest pain, palpitations, dizziness, or leg swelling  GASTROINTESTINAL: No abdominal pain. No nausea, vomiting, or hematemesis; No diarrhea or constipation. No melena or hematochezia.  GENITOURINARY: No dysuria or hematuria, urinary frequency  NEUROLOGICAL: No headaches, memory loss, loss of strength, numbness, or tremors  SKIN: No itching, burning, rashes, or lesions     Vital Signs Last 24 Hrs  T(C): 36.8 (02 Feb 2025 08:35), Max: 38.3 (01 Feb 2025 09:19)  T(F): 98.2 (02 Feb 2025 08:35), Max: 100.9 (01 Feb 2025 09:19)  HR: 69 (02 Feb 2025 08:35) (69 - 99)  BP: 167/88 (02 Feb 2025 08:35) (133/66 - 218/115)  BP(mean): --  RR: 18 (02 Feb 2025 08:35) (18 - 21)  SpO2: 96% (02 Feb 2025 08:35) (92% - 100%)    Parameters below as of 02 Feb 2025 08:35  Patient On (Oxygen Delivery Method): room air        PHYSICAL EXAMINATION:  GENERAL: NAD, well built  HEAD:  Atraumatic, Normocephalic  EYES:  conjunctiva and sclera clear  NECK: Supple, No JVD, Normal thyroid  CHEST/LUNG: Clear to auscultation. Clear to percussion bilaterally; No rales, rhonchi, wheezing, or rubs  HEART: Regular rate and rhythm; No murmurs, rubs, or gallops  ABDOMEN: Soft, Nontender, Nondistended; Bowel sounds present  NERVOUS SYSTEM:  Alert & Oriented X3,    EXTREMITIES:  2+ Peripheral Pulses, No clubbing, cyanosis, or edema  SKIN: warm dry                          14.0   5.26  )-----------( 74       ( 02 Feb 2025 06:15 )             39.7     02-02    140  |  108  |  14  ----------------------------<  155[H]  3.3[L]   |  27  |  0.75    Ca    8.6      02 Feb 2025 06:15  Phos  2.7     02-02  Mg     2.2     02-02    TPro  6.0  /  Alb  3.0[L]  /  TBili  0.5  /  DBili  0.2  /  AST  17  /  ALT  18  /  AlkPhos  79  02-01    LIVER FUNCTIONS - ( 01 Feb 2025 12:53 )  Alb: 3.0 g/dL / Pro: 6.0 g/dL / ALK PHOS: 79 U/L / ALT: 18 U/L DA / AST: 17 U/L / GGT: x               PT/INR - ( 31 Jan 2025 23:28 )   PT: 11.9 sec;   INR: 1.03 ratio         PTT - ( 31 Jan 2025 23:28 )  PTT:31.1 sec    CAPILLARY BLOOD GLUCOSE      RADIOLOGY & ADDITIONAL TESTS:                   PGY-1 Progress Note discussed with attending    INTERVAL HPI/OVERNIGHT EVENTS: No overnight events. Patient had 1 large BM overnight. Denies any complaints. Called family members multiple times for clarification for meds with no response.     MEDICATIONS  (STANDING):  aspirin  chewable 81 milliGRAM(s) Oral daily  atenolol  Tablet 50 milliGRAM(s) Oral daily  atorvastatin 40 milliGRAM(s) Oral at bedtime  clopidogrel Tablet 75 milliGRAM(s) Oral daily  enalapril 10 milliGRAM(s) Oral daily  enoxaparin Injectable 40 milliGRAM(s) SubCutaneous every 24 hours  ezetimibe 10 milliGRAM(s) Oral daily  folic acid 1 milliGRAM(s) Oral daily  influenza  Vaccine (HIGH DOSE) 0.5 milliLiter(s) IntraMuscular once  insulin lispro (ADMELOG) corrective regimen sliding scale   SubCutaneous three times a day before meals  insulin lispro (ADMELOG) corrective regimen sliding scale   SubCutaneous at bedtime  multivitamin 1 Tablet(s) Oral daily  oseltamivir 75 milliGRAM(s) Oral two times a day  potassium chloride   Powder 40 milliEquivalent(s) Oral once  sertraline 25 milliGRAM(s) Oral daily  tamsulosin 0.4 milliGRAM(s) Oral at bedtime  thiamine 100 milliGRAM(s) Oral daily    MEDICATIONS  (PRN):  acetaminophen     Tablet .. 650 milliGRAM(s) Oral every 6 hours PRN Temp greater or equal to 38C (100.4F), Mild Pain (1 - 3)  LORazepam   Injectable 1 milliGRAM(s) IV Push every 2 hours PRN CIWA-Ar score increase by 2 points and a total score of 7 or less  ondansetron Injectable 4 milliGRAM(s) IV Push every 8 hours PRN Nausea and/or Vomiting      REVIEW OF SYSTEMS:  CONSTITUTIONAL: No fever, weight loss, or fatigue  RESPIRATORY: No cough, wheezing, chills or hemoptysis; No shortness of breath  CARDIOVASCULAR: No chest pain, palpitations, dizziness, or leg swelling  GASTROINTESTINAL: No abdominal pain. No nausea, vomiting, or hematemesis; +large BM, no constipation. No melena or hematochezia.  GENITOURINARY: No dysuria or hematuria, urinary frequency  NEUROLOGICAL: No headaches, memory loss, loss of strength, numbness, or tremors  SKIN: No itching, burning, rashes, or lesions     Vital Signs Last 24 Hrs  T(C): 36.8 (02 Feb 2025 08:35), Max: 38.3 (01 Feb 2025 09:19)  T(F): 98.2 (02 Feb 2025 08:35), Max: 100.9 (01 Feb 2025 09:19)  HR: 69 (02 Feb 2025 08:35) (69 - 99)  BP: 167/88 (02 Feb 2025 08:35) (133/66 - 218/115)  BP(mean): --  RR: 18 (02 Feb 2025 08:35) (18 - 21)  SpO2: 96% (02 Feb 2025 08:35) (92% - 100%)    Parameters below as of 02 Feb 2025 08:35  Patient On (Oxygen Delivery Method): room air        PHYSICAL EXAMINATION:  GENERAL: NAD, well built  HEAD:  Atraumatic, Normocephalic  EYES:  conjunctiva and sclera clear  NECK: Supple, No JVD, Normal thyroid  CHEST/LUNG: Clear to auscultation. No rales, rhonchi, wheezing, or rubs  HEART: Regular rate and rhythm; No murmurs, rubs, or gallops  ABDOMEN: Soft, Nontender, Nondistended; Bowel sounds present  NERVOUS SYSTEM:  Alert & Oriented X3, 5/5 motor strength, no extremity drift in all 4 extremities, no loss of sensation    EXTREMITIES:  2+ Peripheral Pulses, No clubbing, cyanosis, or edema  SKIN: warm dry                          14.0   5.26  )-----------( 74       ( 02 Feb 2025 06:15 )             39.7     02-02    140  |  108  |  14  ----------------------------<  155[H]  3.3[L]   |  27  |  0.75    Ca    8.6      02 Feb 2025 06:15  Phos  2.7     02-02  Mg     2.2     02-02    TPro  6.0  /  Alb  3.0[L]  /  TBili  0.5  /  DBili  0.2  /  AST  17  /  ALT  18  /  AlkPhos  79  02-01    LIVER FUNCTIONS - ( 01 Feb 2025 12:53 )  Alb: 3.0 g/dL / Pro: 6.0 g/dL / ALK PHOS: 79 U/L / ALT: 18 U/L DA / AST: 17 U/L / GGT: x               PT/INR - ( 31 Jan 2025 23:28 )   PT: 11.9 sec;   INR: 1.03 ratio         PTT - ( 31 Jan 2025 23:28 )  PTT:31.1 sec    CAPILLARY BLOOD GLUCOSE      RADIOLOGY & ADDITIONAL TESTS:

## 2025-02-02 NOTE — CHART NOTE - NSCHARTNOTEFT_GEN_A_CORE
Pt had ativan 1mg at 8pm, still had CIWA=8 at 10pm. Discussed with PGY3 Dr Reynolds and gave ativan 1mg at 10pm.

## 2025-02-02 NOTE — PHARMACOTHERAPY INTERVENTION NOTE - COMMENTS
Patient identified by [facility] for Patients 66 y/o and Older Report.     Lorazepam - CIWA protocol     No intervention at this time.

## 2025-02-02 NOTE — PROGRESS NOTE ADULT - PROBLEM SELECTOR PLAN 1
-patient brought due to confusion, weakness by family members after taking benadryl for feeling unwell generally   -denies any systemic symptoms  -states that 4 days ago had a stroke treated at [facility]   -in the ED meeting sirs criteria   -patient with multiple possible etiologies for encephalopathy, unclear baseline of mentation, currently AOx3 on 2/2/25  -c/w tamiflu  - neurology following Dr. Niño   -monitoring mental status -patient brought due to confusion, weakness by family members after taking benadryl for feeling unwell generally   -denies any systemic symptoms  -states that 4 days ago had a stroke treated at [facility]   -in the ED meeting sirs criteria   -patient with multiple possible etiologies for encephalopathy (due to infection vs recent stroke vs medication side effect), unclear baseline of mentation, currently AOx3 on 2/2/25  -c/w tamiflu  - neurology following Dr. Niño   -monitoring mental status

## 2025-02-02 NOTE — PROGRESS NOTE ADULT - ATTENDING COMMENTS
Patient was seen and examined at bedside. Appears more oriented than yesterday  Reports had a BM this morning   Denies any other complains  CIWA 0 on my exam     ICU Vital Signs Last 24 Hrs  T(C): 37.1 (02 Feb 2025 16:29), Max: 37.1 (02 Feb 2025 16:29)  T(F): 98.8 (02 Feb 2025 16:29), Max: 98.8 (02 Feb 2025 16:29)  HR: 71 (02 Feb 2025 16:29) (68 - 99)  BP: 174/93 (02 Feb 2025 16:29) (144/75 - 218/115)  BP(mean): --  ABP: --  ABP(mean): --  RR: 18 (02 Feb 2025 16:29) (18 - 19)  SpO2: 99% (02 Feb 2025 16:29) (95% - 100%)    O2 Parameters below as of 02 Feb 2025 16:29  Patient On (Oxygen Delivery Method): room air    P/E:  NAD  AAOx2, mild right UE pronator drift, no other focal deficit   CTABL  S1S2 WNL  Abd soft, non tender, BS present   BLLE no edema or calf tenderness                          14.0   5.26  )-----------( 74       ( 02 Feb 2025 06:15 )             39.7   02-02    140  |  108  |  14  ----------------------------<  155[H]  3.3[L]   |  27  |  0.75    Ca    8.6      02 Feb 2025 06:15  Phos  2.7     02-02  Mg     2.2     02-02    TPro  6.0  /  Alb  3.0[L]  /  TBili  0.5  /  DBili  0.2  /  AST  17  /  ALT  18  /  AlkPhos  79  02-01    Tele noted, QTc prolonged     A/P:  Acute encephalopathy, possibly multifactorial from medication side effect while drinking alcohol, Influenza, recent CVA, alcohol withdrawal   Thrombocytopenia   Influenza   Hypokalemia   Hypoalbuminemia   PAD  CAD  DM  HTN    Plan:   Cont Tamiflu  Avoid anticholinergics, benzodiazepines, limit lines/tubes/tethers/restraints, encourage frequent reorientation/reassurance by staff, encourage good sleep hygiene, adequate lighting  Cont PRN Ativan for CIWA protocol  IVF  IV Thiamine and folic acid   Neuro consult appreciated pending MRI brain, MRA head and neck  Cont aspirin, plavix and statin   Monitor platelet count   ISS   Cont home meds for BP  Tamsulosin for BPH  Aspiration, fall and seizure precaution  Lovenox for DVT ppx   Discussed with RN and NP at bedside. Patient was seen and examined at bedside. Appears more oriented than yesterday  Reports had a BM this morning   Denies any other complains  CIWA 0 on my exam     ICU Vital Signs Last 24 Hrs  T(C): 37.1 (02 Feb 2025 16:29), Max: 37.1 (02 Feb 2025 16:29)  T(F): 98.8 (02 Feb 2025 16:29), Max: 98.8 (02 Feb 2025 16:29)  HR: 71 (02 Feb 2025 16:29) (68 - 99)  BP: 174/93 (02 Feb 2025 16:29) (144/75 - 218/115)  BP(mean): --  ABP: --  ABP(mean): --  RR: 18 (02 Feb 2025 16:29) (18 - 19)  SpO2: 99% (02 Feb 2025 16:29) (95% - 100%)    O2 Parameters below as of 02 Feb 2025 16:29  Patient On (Oxygen Delivery Method): room air    P/E:  NAD  AAOx2, mild right UE pronator drift, no other focal deficit   CTABL  S1S2 WNL  Abd soft, non tender, BS present   BLLE no edema or calf tenderness                          14.0   5.26  )-----------( 74       ( 02 Feb 2025 06:15 )             39.7   02-02    140  |  108  |  14  ----------------------------<  155[H]  3.3[L]   |  27  |  0.75    Ca    8.6      02 Feb 2025 06:15  Phos  2.7     02-02  Mg     2.2     02-02    TPro  6.0  /  Alb  3.0[L]  /  TBili  0.5  /  DBili  0.2  /  AST  17  /  ALT  18  /  AlkPhos  79  02-01    Tele noted, QTc prolonged     A/P:  Acute encephalopathy, possibly multifactorial from medication side effect while drinking alcohol, Influenza, recent CVA, alcohol withdrawal   Thrombocytopenia   Influenza   Hypokalemia   Hypoalbuminemia   PAD  CAD  DM  HTN    Plan:   Cont Tamiflu  Avoid anticholinergics, benzodiazepines, limit lines/tubes/tethers/restraints, encourage frequent reorientation/reassurance by staff, encourage good sleep hygiene, adequate lighting  Cont PRN Ativan for CIWA protocol  IVF  IV Thiamine and folic acid   Neuro consult appreciated pending MRI brain, MRA head and neck  Cont aspirin, plavix and statin   Monitor platelet count   ISS   Cont home meds for BP  Tamsulosin for BPH  Aspiration, fall and seizure precaution  Lovenox for DVT ppx   Discussed with RN and resident

## 2025-02-02 NOTE — PROGRESS NOTE ADULT - ASSESSMENT
73 yo M with pmhx of CVA, PAD, CAD, DM, HTN that is zane in for weakness. Patient to be admitted for acute encephalopathy due to infection vs stroke  75 yo M with pmhx of CVA, PAD, CAD, DM, HTN that is zane in for weakness. Patient to be admitted for acute encephalopathy due to infection vs recent stroke vs medication side effect

## 2025-02-03 LAB
ANION GAP SERPL CALC-SCNC: 7 MMOL/L — SIGNIFICANT CHANGE UP (ref 5–17)
APTT BLD: 29.7 SEC — SIGNIFICANT CHANGE UP (ref 24.5–35.6)
BUN SERPL-MCNC: 15 MG/DL — SIGNIFICANT CHANGE UP (ref 7–18)
CALCIUM SERPL-MCNC: 8.9 MG/DL — SIGNIFICANT CHANGE UP (ref 8.4–10.5)
CHLORIDE SERPL-SCNC: 108 MMOL/L — SIGNIFICANT CHANGE UP (ref 96–108)
CO2 SERPL-SCNC: 26 MMOL/L — SIGNIFICANT CHANGE UP (ref 22–31)
CREAT SERPL-MCNC: 0.76 MG/DL — SIGNIFICANT CHANGE UP (ref 0.5–1.3)
EGFR: 96 ML/MIN/1.73M2 — SIGNIFICANT CHANGE UP
FLUAV H3 RNA SPEC QL NAA+PROBE: DETECTED
GLUCOSE BLDC GLUCOMTR-MCNC: 126 MG/DL — HIGH (ref 70–99)
GLUCOSE BLDC GLUCOMTR-MCNC: 129 MG/DL — HIGH (ref 70–99)
GLUCOSE BLDC GLUCOMTR-MCNC: 162 MG/DL — HIGH (ref 70–99)
GLUCOSE BLDC GLUCOMTR-MCNC: 164 MG/DL — HIGH (ref 70–99)
GLUCOSE SERPL-MCNC: 165 MG/DL — HIGH (ref 70–99)
HCT VFR BLD CALC: 44.8 % — SIGNIFICANT CHANGE UP (ref 39–50)
HGB BLD-MCNC: 15.7 G/DL — SIGNIFICANT CHANGE UP (ref 13–17)
INR BLD: 1.1 RATIO — SIGNIFICANT CHANGE UP (ref 0.85–1.16)
MAGNESIUM SERPL-MCNC: 2.1 MG/DL — SIGNIFICANT CHANGE UP (ref 1.6–2.6)
MCHC RBC-ENTMCNC: 34.5 PG — HIGH (ref 27–34)
MCHC RBC-ENTMCNC: 35 G/DL — SIGNIFICANT CHANGE UP (ref 32–36)
MCV RBC AUTO: 98.5 FL — SIGNIFICANT CHANGE UP (ref 80–100)
NRBC # BLD: 0 /100 WBCS — SIGNIFICANT CHANGE UP (ref 0–0)
NRBC BLD-RTO: 0 /100 WBCS — SIGNIFICANT CHANGE UP (ref 0–0)
PHOSPHATE SERPL-MCNC: 2.3 MG/DL — LOW (ref 2.5–4.5)
PLATELET # BLD AUTO: 76 K/UL — LOW (ref 150–400)
POTASSIUM SERPL-MCNC: 3.3 MMOL/L — LOW (ref 3.5–5.3)
POTASSIUM SERPL-SCNC: 3.3 MMOL/L — LOW (ref 3.5–5.3)
PROTHROM AB SERPL-ACNC: 12.8 SEC — SIGNIFICANT CHANGE UP (ref 9.9–13.4)
RAPID RVP RESULT: DETECTED
RBC # BLD: 4.55 M/UL — SIGNIFICANT CHANGE UP (ref 4.2–5.8)
RBC # FLD: 13.3 % — SIGNIFICANT CHANGE UP (ref 10.3–14.5)
SARS-COV-2 RNA SPEC QL NAA+PROBE: SIGNIFICANT CHANGE UP
SODIUM SERPL-SCNC: 141 MMOL/L — SIGNIFICANT CHANGE UP (ref 135–145)
WBC # BLD: 7.52 K/UL — SIGNIFICANT CHANGE UP (ref 3.8–10.5)
WBC # FLD AUTO: 7.52 K/UL — SIGNIFICANT CHANGE UP (ref 3.8–10.5)

## 2025-02-03 PROCEDURE — 70551 MRI BRAIN STEM W/O DYE: CPT | Mod: 26

## 2025-02-03 PROCEDURE — 93880 EXTRACRANIAL BILAT STUDY: CPT | Mod: 26

## 2025-02-03 PROCEDURE — 99221 1ST HOSP IP/OBS SF/LOW 40: CPT

## 2025-02-03 PROCEDURE — 99222 1ST HOSP IP/OBS MODERATE 55: CPT

## 2025-02-03 PROCEDURE — 99233 SBSQ HOSP IP/OBS HIGH 50: CPT | Mod: GC

## 2025-02-03 PROCEDURE — 70544 MR ANGIOGRAPHY HEAD W/O DYE: CPT | Mod: 26,XU

## 2025-02-03 PROCEDURE — 99232 SBSQ HOSP IP/OBS MODERATE 35: CPT

## 2025-02-03 RX ORDER — MECOBAL/LEVOMEFOLAT CA/B6 PHOS 2-3-35 MG
1 TABLET ORAL DAILY
Refills: 0 | Status: DISCONTINUED | OUTPATIENT
Start: 2025-02-03 | End: 2025-02-03

## 2025-02-03 RX ORDER — SODIUM PHOSPHATE, DIBASIC, ANHYDROUS, POTASSIUM PHOSPHATE, MONOBASIC, AND SODIUM PHOSPHATE, MONOBASIC, MONOHYDRATE 852; 155; 130 MG/1; MG/1; MG/1
1 TABLET, COATED ORAL ONCE
Refills: 0 | Status: DISCONTINUED | OUTPATIENT
Start: 2025-02-03 | End: 2025-02-03

## 2025-02-03 RX ORDER — THIAMINE HCL 100 MG
500 TABLET ORAL EVERY 8 HOURS
Refills: 0 | Status: DISCONTINUED | OUTPATIENT
Start: 2025-02-03 | End: 2025-02-06

## 2025-02-03 RX ORDER — POTASSIUM PHOSPHATE, MONOBASIC POTASSIUM PHOSPHATE, DIBASIC 236; 224 MG/ML; MG/ML
15 INJECTION, SOLUTION INTRAVENOUS ONCE
Refills: 0 | Status: COMPLETED | OUTPATIENT
Start: 2025-02-03 | End: 2025-02-03

## 2025-02-03 RX ORDER — POTASSIUM CHLORIDE 750 MG/1
10 TABLET, EXTENDED RELEASE ORAL
Refills: 0 | Status: COMPLETED | OUTPATIENT
Start: 2025-02-03 | End: 2025-02-03

## 2025-02-03 RX ORDER — THIAMINE HCL 100 MG
500 TABLET ORAL THREE TIMES A DAY
Refills: 0 | Status: DISCONTINUED | OUTPATIENT
Start: 2025-02-03 | End: 2025-02-03

## 2025-02-03 RX ADMIN — Medication 25 MILLIGRAM(S): at 13:19

## 2025-02-03 RX ADMIN — ENOXAPARIN SODIUM 40 MILLIGRAM(S): 100 INJECTION SUBCUTANEOUS at 06:49

## 2025-02-03 RX ADMIN — OSELTAMIVIR PHOSPHATE 75 MILLIGRAM(S): 75 CAPSULE ORAL at 06:48

## 2025-02-03 RX ADMIN — Medication 1 MILLIGRAM(S): at 00:53

## 2025-02-03 RX ADMIN — OSELTAMIVIR PHOSPHATE 75 MILLIGRAM(S): 75 CAPSULE ORAL at 17:38

## 2025-02-03 RX ADMIN — Medication 50 MILLIGRAM(S): at 06:48

## 2025-02-03 RX ADMIN — POTASSIUM CHLORIDE 100 MILLIEQUIVALENT(S): 750 TABLET, EXTENDED RELEASE ORAL at 13:18

## 2025-02-03 RX ADMIN — Medication 1 DROP(S): at 06:52

## 2025-02-03 RX ADMIN — Medication 1 DROP(S): at 15:34

## 2025-02-03 RX ADMIN — POTASSIUM CHLORIDE 100 MILLIEQUIVALENT(S): 750 TABLET, EXTENDED RELEASE ORAL at 15:36

## 2025-02-03 RX ADMIN — Medication 100 MILLIGRAM(S): at 13:19

## 2025-02-03 RX ADMIN — ATORVASTATIN CALCIUM 80 MILLIGRAM(S): 80 TABLET, FILM COATED ORAL at 22:49

## 2025-02-03 RX ADMIN — Medication 105 MILLIGRAM(S): at 17:37

## 2025-02-03 RX ADMIN — POTASSIUM PHOSPHATE, MONOBASIC POTASSIUM PHOSPHATE, DIBASIC 62.5 MILLIMOLE(S): 236; 224 INJECTION, SOLUTION INTRAVENOUS at 13:18

## 2025-02-03 RX ADMIN — Medication 1 DROP(S): at 22:49

## 2025-02-03 RX ADMIN — TAMSULOSIN HYDROCHLORIDE 0.4 MILLIGRAM(S): 0.4 CAPSULE ORAL at 22:49

## 2025-02-03 RX ADMIN — Medication 10 MILLIGRAM(S): at 13:19

## 2025-02-03 RX ADMIN — Medication 1: at 08:37

## 2025-02-03 RX ADMIN — Medication 1 TABLET(S): at 14:41

## 2025-02-03 RX ADMIN — Medication 105 MILLIGRAM(S): at 22:49

## 2025-02-03 RX ADMIN — Medication 75 MILLIGRAM(S): at 13:19

## 2025-02-03 RX ADMIN — Medication 10 MILLIGRAM(S): at 06:49

## 2025-02-03 RX ADMIN — POTASSIUM CHLORIDE 100 MILLIEQUIVALENT(S): 750 TABLET, EXTENDED RELEASE ORAL at 14:41

## 2025-02-03 RX ADMIN — Medication 1 MILLIGRAM(S): at 13:19

## 2025-02-03 RX ADMIN — ASPIRIN 81 MILLIGRAM(S): 81 TABLET, COATED ORAL at 13:19

## 2025-02-03 NOTE — CONSULT NOTE ADULT - SUBJECTIVE AND OBJECTIVE BOX
Vascular Surgery Consultation    HPI:  73 yo M with pmhx of CVA, PAD, CAD, DM, HTN that is brought in for weakness. Patient at bedside a very poor historian, stating that today started feeling unwell generally for which took a medication (patient could not recall ) and per ED note took Nyquil and after taking the medication patient could not recall what happened after. Per ED note after taking the medication patient started acting confused and generalized weak. Patient states that 4 days ago was with his doctor that was checking his eye and was told to go the ER. Patient went to [facility] and was treated for a stroke with medications and after was discharged home. Patient denies any N/V/D, SOB, CP. [employer]HS 0 (01 Feb 2025 04:19)    Vascular surgery consulted for CT findings of significant right ICA stenosis. Pt seen and examined at bedside. Pt unable to give helpful history. Collateral history obtained from chart.       PAST MEDICAL & SURGICAL HISTORY:  Coronary artery disease  s/p CABG      Cerebrovascular accident (CVA) with involvement of right side of body      BPH (benign prostatic hyperplasia)      Stented coronary artery      PVD (peripheral vascular disease)      HLD (hyperlipidemia)      HTN (hypertension)      Vertebral osteomyelitis      Thrombocytopenia      History of coronary artery bypass surgery      S/P femoral-tibial bypass      History of fusion of cervical spine          MEDICATIONS  (STANDING):  artificial  tears Solution 1 Drop(s) Both EYES three times a day  aspirin  chewable 81 milliGRAM(s) Oral daily  atenolol  Tablet 50 milliGRAM(s) Oral daily  atorvastatin 80 milliGRAM(s) Oral at bedtime  clopidogrel Tablet 75 milliGRAM(s) Oral daily  enalapril 10 milliGRAM(s) Oral daily  enoxaparin Injectable 40 milliGRAM(s) SubCutaneous every 24 hours  ezetimibe 10 milliGRAM(s) Oral daily  folic acid 1 milliGRAM(s) Oral daily  influenza  Vaccine (HIGH DOSE) 0.5 milliLiter(s) IntraMuscular once  insulin lispro (ADMELOG) corrective regimen sliding scale   SubCutaneous three times a day before meals  insulin lispro (ADMELOG) corrective regimen sliding scale   SubCutaneous at bedtime  multivitamin 1 Tablet(s) Oral daily  multivitamin 1 Tablet(s) Oral daily  oseltamivir 75 milliGRAM(s) Oral two times a day  sertraline 25 milliGRAM(s) Oral daily  tamsulosin 0.4 milliGRAM(s) Oral at bedtime  thiamine IVPB 500 milliGRAM(s) IV Intermittent every 8 hours    MEDICATIONS  (PRN):  acetaminophen     Tablet .. 650 milliGRAM(s) Oral every 6 hours PRN Temp greater or equal to 38C (100.4F), Mild Pain (1 - 3)  LORazepam   Injectable 1 milliGRAM(s) IV Push every 2 hours PRN CIWA-Ar score increase by 2 points and a total score of 7 or less  ondansetron Injectable 4 milliGRAM(s) IV Push every 8 hours PRN Nausea and/or Vomiting      Allergies    No Known Allergies    Intolerances        SOCIAL HISTORY:    FAMILY HISTORY:  Family history of heart disease (Mother)    FH: hypertension (Mother)    FHx: prostate cancer (Father)        Vital Signs Last 24 Hrs  T(C): 36.6 (03 Feb 2025 15:23), Max: 37.3 (02 Feb 2025 23:40)  T(F): 97.9 (03 Feb 2025 15:23), Max: 99.1 (02 Feb 2025 23:40)  HR: 64 (03 Feb 2025 15:23) (64 - 93)  BP: 158/84 (03 Feb 2025 15:23) (158/84 - 177/92)  BP(mean): 104 (03 Feb 2025 04:50) (104 - 115)  RR: 18 (03 Feb 2025 15:23) (18 - 18)  SpO2: 97% (03 Feb 2025 15:23) (93% - 100%)    Parameters below as of 03 Feb 2025 15:23  Patient On (Oxygen Delivery Method): room air        I&O's Summary    02 Feb 2025 07:01  -  03 Feb 2025 07:00  --------------------------------------------------------  IN: 0 mL / OUT: 275 mL / NET: -275 mL    T(C): 36.6 (02-03-25 @ 15:23), Max: 37.3 (02-02-25 @ 23:40)  HR: 64 (02-03-25 @ 15:23) (64 - 93)  BP: 158/84 (02-03-25 @ 15:23) (158/84 - 177/92)  RR: 18 (02-03-25 @ 15:23) (18 - 18)  SpO2: 97% (02-03-25 @ 15:23) (93% - 100%)    CONSTITUTIONAL: Well groomed, no apparent distress  RESP: No respiratory distress, no use of accessory muscles  VASC: Palpable carotid pulses bilaterally, palpable brachial and radial pulses bilaterally      LABS:                        15.7   7.52  )-----------( 76       ( 03 Feb 2025 06:59 )             44.8     02-03    141  |  108  |  15  ----------------------------<  165[H]  3.3[L]   |  26  |  0.76    Ca    8.9      03 Feb 2025 06:59  Phos  2.3     02-03  Mg     2.1     02-03      PT/INR - ( 03 Feb 2025 06:59 )   PT: 12.8 sec;   INR: 1.10 ratio         PTT - ( 03 Feb 2025 06:59 )  PTT:29.7 sec  Urinalysis Basic - ( 03 Feb 2025 06:59 )    Color: x / Appearance: x / SG: x / pH: x  Gluc: 165 mg/dL / Ketone: x  / Bili: x / Urobili: x   Blood: x / Protein: x / Nitrite: x   Leuk Esterase: x / RBC: x / WBC x   Sq Epi: x / Non Sq Epi: x / Bacteria: x      CAPILLARY BLOOD GLUCOSE      POCT Blood Glucose.: 126 mg/dL (03 Feb 2025 16:44)  POCT Blood Glucose.: 129 mg/dL (03 Feb 2025 13:11)  POCT Blood Glucose.: 164 mg/dL (03 Feb 2025 07:44)        Cultures:      RADIOLOGY & ADDITIONAL STUDIES:    < from: CT Angio Neck Stroke Protocol w/ IV Cont (01.31.25 @ 23:59) >  IMPRESSION:    CTA NECK:  High-grade atherosclerotic stenosis involving the origin of the right   internal carotid artery, (roughly 90%)    Intermittent areas of moderate atherosclerotic narrowing involving the V4   segment of the distal left vertebral artery.    No evidence of arterial dissection.      CTA HEAD:  Patent intracranial circulation without flow limiting stenosis.        --- End of Report ---            FIGUEROA NOVA MD; Attending Radiologist  This document has been electronically signed. Feb 1 2025 12:27AM    < end of copied text >    < from: US Duplex Carotid Arteries Complete, Bilateral (02.03.25 @ 13:39) >  IMPRESSION:    Although there is prominent calcified atheromatous plaque at the   bifurcations, this examination does not confirm the presence of a high   grade stenosis of the right internal carotid artery.    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      Measurement of carotid stenosis is based on updated recommendations for   carotid stenosis interpretation criteria from the Intersocietal   Accreditation Commission (IAC) Vascular Testing Board, modified from the   Society of Radiologists in Ultrasound (SRU) Consensus Conference Criteria   for Internal Carotid Artery Stenosis.    --- End of Report ---            JACKLYN DAVIS MD;Attending Interventional Radiologist  This document has been electronically signed. Feb  3 2025  3:13PM    < end of copied text >

## 2025-02-03 NOTE — PROGRESS NOTE ADULT - SUBJECTIVE AND OBJECTIVE BOX
Neurology  Consult Note  Follow-Up    Polish  #[id number] was used, but patient not able to provide history    74 M with hx stroke 2 years ago residual R-sided deficits reported, on DAPT, PAD, CAD s/p CABG, HTN, DM, EtOH use (1 bottle of wine daily, last drink 2 days prior to admission)  On 1/31 presented with flu-like symptoms, and had confusion and generalized weakness after taking NyQuil. Per chart, patient didn't remember this episode.  Per chart, reported to have stroke 4 days prior to admission - per chart, patient stated he was sent to [facility] after an eye exam and treated as stroke.    Pt not able to provide history, not finishing his sentences/thoughts, and giving answers not fully relevant to the question.    Exam:  Cognition: awake, alert, registration 3/3 after 2-3 trials, recall 0/3 even with category and multiple choice cues. 100-7 = 93, unable to do further serial 7s. counts 20->1 with errors (skips a few numbers, perseverates on a few numbers, goes out of order at times - for example, says "2-1-4-3")  Language: fluent speech. names simple objects. comprehension somewhat impaired (difficulty with multistep commands). repeats simple sentences at times with minor variation  CN: BTT decreased on L. EOMI. R NLF?.  states pt's speech sounds slurred.  Motor: all extremities antigravity, no downward drift in BUE but there is LUE pronation. BLE minimal downward drift.  Coord: FNF no ataxia    Vitals, meds, labs, relevant imaging reviewed, notable for:  2/3 MRI brain w/o: L posterior hippocampal and inferior temporal acute infarct. remote b/l thalamus, R external capsule, and R ventral pontine infarcts. small R occipital chronic infarct  CTAhn: ~90% stenosis R ICA origin. intermittent moderate narrowing L V4 distal vertebral. intracranial arteries patent.    PLT 76    Assessment:  Acute stroke, L PCA territory  L V4 distal vertebral artery narrowing  Chronic infarcts including small R occipital  R ICA origin stenosis ~90%    Recommendations:  -clarify patient's baseline mental status and history of recent stroke symptoms and workup at [facility]  -obtain records of stroke workup from [facility]  -aspirin 81 mg daily  -discontinue plavix for now, due to thrombocytopenia. if thrombocytopenia resolves, patient should be on plavix 75 mg daily for 3 weeks from stroke onset  -hematology consult to assess risk of dual antiplatelet and for workup of thrombocytopenia  -vascular consult - of note, R ICA origin stenosis seems asymptomatic currently based on MRI findings (though he does have LUE pronator drift and decreased blink to threat on L)  -telemetry  -TTE  -consider JUANA after TTE  -loop recorder  -routine EEG pending  -high-dose thiamine: 500 mg q8h IV x3 days, then 250 mg IV daily x3 days, then 100 mg PO daily indefinitely  -check B12, folate, thiamine, HIV, vitamin D, TSH. if B12 level < 400, give B12 supplement and check intrinsic factor and antiparietal cell antibodies    Cici Callahan MD Neurology  Consult Note  Follow-Up    Polish  #[id number] was used, but patient not able to provide history    74 M with hx stroke 2 years ago residual R-sided deficits reported, on DAPT, PAD, CAD s/p CABG, HTN, DM, EtOH use (1 bottle of wine daily, last drink 2 days prior to admission)  On 1/31 presented with flu-like symptoms, and had confusion and generalized weakness after taking NyQuil. Per chart, patient didn't remember this episode.  Per chart, reported to have stroke 4 days prior to admission - per chart, patient stated he was sent to [facility] after an eye exam and treated as stroke.    Pt not able to provide history, not finishing his sentences/thoughts, and giving answers not fully relevant to the question.    Exam:  Cognition: awake, alert, registration 3/3 after 2-3 trials, recall 0/3 even with category and multiple choice cues. 100-7 = 93, unable to do further serial 7s. counts 20->1 with errors (skips a few numbers, perseverates on a few numbers, goes out of order at times - for example, says "2-1-4-3")  Language: fluent speech. names simple objects. comprehension somewhat impaired (difficulty with multistep commands). repeats simple sentences at times with minor variation  CN: BTT decreased on L. EOMI. R NLF?.  states pt's speech sounds slurred.  Motor: all extremities antigravity, no downward drift in BUE but there is LUE pronation. BLE minimal downward drift.  Coord: FNF no ataxia    Vitals, meds, labs, relevant imaging reviewed, notable for:  2/3 MRI brain w/o: L posterior hippocampal and inferior temporal acute infarct. remote b/l thalamus, R external capsule, and R ventral pontine infarcts. small R occipital chronic infarct  CTAhn: ~90% stenosis R ICA origin. intermittent moderate narrowing L V4 distal vertebral. intracranial arteries patent.    PLT 76  A1c 71  LDL 76    Assessment:  Acute stroke, L PCA territory  L V4 distal vertebral artery narrowing  Chronic infarcts including small R occipital  R ICA origin stenosis ~90%    Recommendations:  -clarify patient's baseline mental status and history of recent stroke symptoms and workup at [facility]  -obtain records of stroke workup from [facility]  -aspirin 81 mg daily  -discontinue plavix for now, due to thrombocytopenia. if thrombocytopenia resolves, patient should be on plavix 75 mg daily for 3 weeks from stroke onset  -hematology consult to assess risk of dual antiplatelet and for workup of thrombocytopenia  -high-dose statin, goal LDL < 70  -vascular consult appreciated - of note, R ICA origin stenosis seems asymptomatic currently based on MRI findings (though he does have LUE pronator drift and decreased blink to threat on L)  -telemetry  -TTE  -consider JUANA after TTE  -loop recorder  -routine EEG pending  -high-dose thiamine: 500 mg q8h IV x3 days, then 250 mg IV daily x3 days, then 100 mg PO daily indefinitely  -check B12, folate, thiamine, HIV, vitamin D, TSH. if B12 level < 400, give B12 supplement and check intrinsic factor and antiparietal cell antibodies    Cici Callahan MD

## 2025-02-03 NOTE — CONSULT NOTE ADULT - NS ATTEND AMEND GEN_ALL_CORE FT
Patient reportedly recently discharged from [facility] with history of CVA. P/w AMS. No focal neurologic deficits. Confused and poor historian. CTA shows right ICA stenosis. MRI shows Left posterior hippocampal formation acute infarction.    No indication for right ICA revascularization at this time. Antiplatelet and statin therapy. Evaluation/management per neurology. Outpatient follow up.

## 2025-02-03 NOTE — PROGRESS NOTE ADULT - ATTENDING COMMENTS
Patient was seen and examined at bedside. Sedated due to ativan last night, arousable with calling by name     ICU Vital Signs Last 24 Hrs  T(C): 36.5 (03 Feb 2025 07:48), Max: 37.3 (02 Feb 2025 23:40)  T(F): 97.7 (03 Feb 2025 07:48), Max: 99.1 (02 Feb 2025 23:40)  HR: 71 (03 Feb 2025 07:48) (71 - 93)  BP: 167/93 (03 Feb 2025 07:48) (165/77 - 177/92)  BP(mean): 104 (03 Feb 2025 04:50) (104 - 115)  ABP: --  ABP(mean): --  RR: 18 (03 Feb 2025 07:48) (18 - 18)  SpO2: 93% (03 Feb 2025 07:48) (93% - 100%)    O2 Parameters below as of 03 Feb 2025 07:48  Patient On (Oxygen Delivery Method): room air    P/E:  NAD  AAOx2, does not follow all commands for full neuro exam  CTABL  S1S2 WNL  Abd soft, non tender, BS present   BLLE no edema or calf tenderness    Labs noted     A/P:  Acute encephalopathy, possibly multifactorial from medication side effect while drinking alcohol, Influenza, recent CVA, alcohol withdrawal   Thrombocytopenia   Influenza   Hypokalemia   Hypoalbuminemia   PAD  CAD  DM  HTN    Plan:   Cont PRN Ativan for CIWA protocol  Cont Tamiflu  Avoid anticholinergics, benzodiazepines, limit lines/tubes/tethers/restraints, encourage frequent reorientation/reassurance by staff, encourage good sleep hygiene, adequate lighting  IV Thiamine and folic acid   Pending MRI brain, MRA head and neck  Cont aspirin, Plavix and statin   Monitor platelet count   ISS   Cont home meds for BP  Tamsulosin for BPH  Aspiration, fall and seizure precaution  Lovenox for DVT ppx   Discussed with resident Patient was seen and examined at bedside. Sedated due to ativan last night, arousable with calling by name     ICU Vital Signs Last 24 Hrs  T(C): 36.5 (03 Feb 2025 07:48), Max: 37.3 (02 Feb 2025 23:40)  T(F): 97.7 (03 Feb 2025 07:48), Max: 99.1 (02 Feb 2025 23:40)  HR: 71 (03 Feb 2025 07:48) (71 - 93)  BP: 167/93 (03 Feb 2025 07:48) (165/77 - 177/92)  BP(mean): 104 (03 Feb 2025 04:50) (104 - 115)  ABP: --  ABP(mean): --  RR: 18 (03 Feb 2025 07:48) (18 - 18)  SpO2: 93% (03 Feb 2025 07:48) (93% - 100%)    O2 Parameters below as of 03 Feb 2025 07:48  Patient On (Oxygen Delivery Method): room air    P/E:  NAD  AAOx2, does not follow all commands for full neuro exam  CTABL  S1S2 WNL  Abd soft, non tender, BS present   BLLE no edema or calf tenderness    Labs noted     A/P:  Acute encephalopathy, possibly multifactorial from medication side effect while drinking alcohol, Influenza, recent CVA, alcohol withdrawal   Thrombocytopenia   Influenza   Hypokalemia   Hypoalbuminemia   PAD  CAD  DM  HTN    Plan:   Cont PRN Ativan for CIWA protocol  Cont Tamiflu  Avoid anticholinergics, benzodiazepines, limit lines/tubes/tethers/restraints, encourage frequent reorientation/reassurance by staff, encourage good sleep hygiene, adequate lighting  IV Thiamine and folic acid   Pending MRI brain, MRA head and neck  Cont aspirin, Plavix and statin   Replace electrolytes   Monitor platelet count   ISS   Cont home meds for BP  Tamsulosin for BPH  Aspiration, fall and seizure precaution  Lovenox for DVT ppx   Discussed with resident

## 2025-02-03 NOTE — PROGRESS NOTE ADULT - PROBLEM SELECTOR PLAN 1
-patient brought due to confusion, weakness by family members after taking benadryl for feeling unwell generally baseline according to family AAOx2 but forgetful  -denies any systemic symptoms  -states that 4 days ago had a stroke treated at [facility]   -in the ED meeting sirs criteria   -patient with multiple possible etiologies for encephalopathy (due to infection vs recent stroke vs medication side effect), unclear baseline of mentation, currently AOx3 on 2/2/25  -c/w tamiflu  - neurology following  -monitor mental status

## 2025-02-03 NOTE — PROGRESS NOTE ADULT - ASSESSMENT
75 yo M with pmhx of CVA, PAD, CAD, DM, HTN that is zane in for weakness. Patient to be admitted for acute encephalopathy due to infection vs recent stroke vs medication side effect

## 2025-02-03 NOTE — CONSULT NOTE ADULT - SUBJECTIVE AND OBJECTIVE BOX
Cardiology Consult Note   [Please check [email or web address] password: "[email or web address]" for cardiology service schedule and contact information]    History of Present Illness:       PAST MEDICAL & SURGICAL HISTORY:  Coronary artery disease  s/p CABG      Cerebrovascular accident (CVA) with involvement of right side of body      BPH (benign prostatic hyperplasia)      Stented coronary artery      PVD (peripheral vascular disease)      HLD (hyperlipidemia)      HTN (hypertension)      Vertebral osteomyelitis      Thrombocytopenia      History of coronary artery bypass surgery      S/P femoral-tibial bypass      History of fusion of cervical spine        FAMILY HISTORY:  Family history of heart disease (Mother)    FH: hypertension (Mother)    FHx: prostate cancer (Father)      SOCIAL HISTORY:  unchanged    MEDICATIONS:  aspirin  chewable 81 milliGRAM(s) Oral daily  atenolol  Tablet 50 milliGRAM(s) Oral daily  clopidogrel Tablet 75 milliGRAM(s) Oral daily  enalapril 10 milliGRAM(s) Oral daily  enoxaparin Injectable 40 milliGRAM(s) SubCutaneous every 24 hours    oseltamivir 75 milliGRAM(s) Oral two times a day      acetaminophen     Tablet .. 650 milliGRAM(s) Oral every 6 hours PRN  LORazepam   Injectable 1 milliGRAM(s) IV Push every 2 hours PRN  ondansetron Injectable 4 milliGRAM(s) IV Push every 8 hours PRN  sertraline 25 milliGRAM(s) Oral daily      atorvastatin 80 milliGRAM(s) Oral at bedtime  ezetimibe 10 milliGRAM(s) Oral daily  insulin lispro (ADMELOG) corrective regimen sliding scale   SubCutaneous three times a day before meals  insulin lispro (ADMELOG) corrective regimen sliding scale   SubCutaneous at bedtime    artificial  tears Solution 1 Drop(s) Both EYES three times a day  folic acid 1 milliGRAM(s) Oral daily  influenza  Vaccine (HIGH DOSE) 0.5 milliLiter(s) IntraMuscular once  multivitamin 1 Tablet(s) Oral daily  potassium chloride  10 mEq/100 mL IVPB 10 milliEquivalent(s) IV Intermittent every 1 hour  tamsulosin 0.4 milliGRAM(s) Oral at bedtime  thiamine 100 milliGRAM(s) Oral daily      REVIEW OF SYSTEMS:  CV: chest pain (-), palpitation (-), orthopnea (-), PND (-), edema (-)  PULM: SOB (-), cough (-), wheezing (-), hemoptysis (-).   CONST: fever (-), chills (-) or fatigue (-)  GI: abdominal distension (-), abdominal pain (-) , nausea/vomiting (-), hematemesis, (-), melena (-), hematochezia (-)  : dysuria (-), frequency (-), hematuria (-).   NEURO: numbness (-), weakness (-), dizziness (-)  SKIN: itching (-), rash (-)  HEENT:  visual changes (-); vertigo or throat pain (-);  neck stiffness (-)     All other review of systems is negative unless indicated above.   -------------------------------------------------------------------------------------------  PHYSICAL EXAM:  T(C): 36.5 (02-03-25 @ 07:48), Max: 37.3 (02-02-25 @ 23:40)  HR: 71 (02-03-25 @ 07:48) (71 - 93)  BP: 167/93 (02-03-25 @ 07:48) (165/77 - 177/92)  RR: 18 (02-03-25 @ 07:48) (18 - 18)  SpO2: 93% (02-03-25 @ 07:48) (93% - 100%)  Wt(kg): --  I&O's Summary    02 Feb 2025 07:01  -  03 Feb 2025 07:00  --------------------------------------------------------  IN: 0 mL / OUT: 275 mL / NET: -275 mL        General: No acute distress. Awake and conversant.   Eyes: Normal conjunctiva, anicteric. Round symmetric pupils.   ENT: Hearing grossly intact. No nasal discharge.   Neck: Neck is supple. No masses or thyromegaly.   Respiratory: Respirations are non-labored. No wheezing.   Skin: Warm. No rashes or ulcers.   Psych: Alert and oriented. Cooperative, Appropriate mood and affect, Normal judgment.   CV: No lower extremity edema.   MSK: Normal ambulation. No clubbing or cyanosis.   Neuro: Sensation and CN II-XII grossly normal.      -------------------------------------------------------------------------------------------  LABS:  02-03    141  |  108  |  15  ----------------------------<  165[H]  3.3[L]   |  26  |  0.76    Ca    8.9      03 Feb 2025 06:59  Phos  2.3     02-03  Mg     2.1     02-03      Creatinine Trend: 0.76<--, 0.75<--, 0.87<--, 1.11<--, 1.00<--                        15.7   7.52  )-----------( 76       ( 03 Feb 2025 06:59 )             44.8     PT/INR - ( 03 Feb 2025 06:59 )   PT: 12.8 sec;   INR: 1.10 ratio         PTT - ( 03 Feb 2025 06:59 )  PTT:29.7 sec    Lipid Panel: aspirin  chewable 81 milliGRAM(s) Oral daily  atenolol  Tablet 50 milliGRAM(s) Oral daily  clopidogrel Tablet 75 milliGRAM(s) Oral daily  enalapril 10 milliGRAM(s) Oral daily  enoxaparin Injectable 40 milliGRAM(s) SubCutaneous every 24 hours    Cardiac Enzymes:         -------------------------------------------------------------------------------------------  Meds:    -------------------------------------------------------------------------------------------  Cardiovascular Diagnostic Testing:    ECG:     Echo:     Stress Testing:    Cath:    Imaging:    CXR:  reviewed  -------------------------------------------------------------------------------------------  Problems Assessed:   1.  2.  3.  4.    Plan:   •  •  •  •    Janusz Perry MD   of Cardiology  [facility] at [address], [facility] [address] [address]  Phone: [phone]  Fax: [phone]  -------------------------------------------------------------------------------------------  Billing Statement:   76/56/51/36 minutes spent on total encounter. Necessity of time spent during this encounter on this date of service was due to review of medical information in EMR, co-ordination of hospital and outpatient care, discussion with patient and communication with primary team.   -------------------------------------------------------------------------------------------  Cardiology Consult Note   [Please check [email or web address] password: [email or web address]" for cardiology service schedule and contact information]    History of Present Illness:       PAST MEDICAL & SURGICAL HISTORY:  Coronary artery disease  s/p CABG      Cerebrovascular accident (CVA) with involvement of right side of body      BPH (benign prostatic hyperplasia)      Stented coronary artery      PVD (peripheral vascular disease)      HLD (hyperlipidemia)      HTN (hypertension)      Vertebral osteomyelitis      Thrombocytopenia      History of coronary artery bypass surgery      S/P femoral-tibial bypass      History of fusion of cervical spine        FAMILY HISTORY:  Family history of heart disease (Mother)    FH: hypertension (Mother)    FHx: prostate cancer (Father)      SOCIAL HISTORY:  unchanged    MEDICATIONS:  aspirin  chewable 81 milliGRAM(s) Oral daily  atenolol  Tablet 50 milliGRAM(s) Oral daily  clopidogrel Tablet 75 milliGRAM(s) Oral daily  enalapril 10 milliGRAM(s) Oral daily  enoxaparin Injectable 40 milliGRAM(s) SubCutaneous every 24 hours    oseltamivir 75 milliGRAM(s) Oral two times a day      acetaminophen     Tablet .. 650 milliGRAM(s) Oral every 6 hours PRN  LORazepam   Injectable 1 milliGRAM(s) IV Push every 2 hours PRN  ondansetron Injectable 4 milliGRAM(s) IV Push every 8 hours PRN  sertraline 25 milliGRAM(s) Oral daily      atorvastatin 80 milliGRAM(s) Oral at bedtime  ezetimibe 10 milliGRAM(s) Oral daily  insulin lispro (ADMELOG) corrective regimen sliding scale   SubCutaneous three times a day before meals  insulin lispro (ADMELOG) corrective regimen sliding scale   SubCutaneous at bedtime    artificial  tears Solution 1 Drop(s) Both EYES three times a day  folic acid 1 milliGRAM(s) Oral daily  influenza  Vaccine (HIGH DOSE) 0.5 milliLiter(s) IntraMuscular once  multivitamin 1 Tablet(s) Oral daily  potassium chloride  10 mEq/100 mL IVPB 10 milliEquivalent(s) IV Intermittent every 1 hour  tamsulosin 0.4 milliGRAM(s) Oral at bedtime  thiamine 100 milliGRAM(s) Oral daily      REVIEW OF SYSTEMS:  CV: chest pain (-), palpitation (-), orthopnea (-), PND (-), edema (-)  PULM: SOB (-), cough (-), wheezing (-), hemoptysis (-).   CONST: fever (-), chills (-) or fatigue (-)  GI: abdominal distension (-), abdominal pain (-) , nausea/vomiting (-), hematemesis, (-), melena (-), hematochezia (-)  : dysuria (-), frequency (-), hematuria (-).   NEURO: numbness (-), weakness (-), dizziness (-)  SKIN: itching (-), rash (-)  HEENT:  visual changes (-); vertigo or throat pain (-);  neck stiffness (-)     All other review of systems is negative unless indicated above.   -------------------------------------------------------------------------------------------  PHYSICAL EXAM:  T(C): 36.5 (02-03-25 @ 07:48), Max: 37.3 (02-02-25 @ 23:40)  HR: 71 (02-03-25 @ 07:48) (71 - 93)  BP: 167/93 (02-03-25 @ 07:48) (165/77 - 177/92)  RR: 18 (02-03-25 @ 07:48) (18 - 18)  SpO2: 93% (02-03-25 @ 07:48) (93% - 100%)  Wt(kg): --  I&O's Summary    02 Feb 2025 07:01  -  03 Feb 2025 07:00  --------------------------------------------------------  IN: 0 mL / OUT: 275 mL / NET: -275 mL        General: No acute distress. Awake and conversant.   Eyes: Normal conjunctiva, anicteric. Round symmetric pupils.   ENT: Hearing grossly intact. No nasal discharge.   Neck: Neck is supple. No masses or thyromegaly.   Respiratory: Respirations are non-labored. No wheezing.   Skin: Warm. No rashes or ulcers.   Psych: Alert and oriented. Cooperative, Appropriate mood and affect, Normal judgment.   CV: No lower extremity edema.   MSK: Normal ambulation. No clubbing or cyanosis.   Neuro: Sensation and CN II-XII grossly normal.      -------------------------------------------------------------------------------------------  LABS:  02-03    141  |  108  |  15  ----------------------------<  165[H]  3.3[L]   |  26  |  0.76    Ca    8.9      03 Feb 2025 06:59  Phos  2.3     02-03  Mg     2.1     02-03      Creatinine Trend: 0.76<--, 0.75<--, 0.87<--, 1.11<--, 1.00<--                        15.7   7.52  )-----------( 76       ( 03 Feb 2025 06:59 )             44.8     PT/INR - ( 03 Feb 2025 06:59 )   PT: 12.8 sec;   INR: 1.10 ratio         PTT - ( 03 Feb 2025 06:59 )  PTT:29.7 sec    Lipid Panel: aspirin  chewable 81 milliGRAM(s) Oral daily  atenolol  Tablet 50 milliGRAM(s) Oral daily  clopidogrel Tablet 75 milliGRAM(s) Oral daily  enalapril 10 milliGRAM(s) Oral daily  enoxaparin Injectable 40 milliGRAM(s) SubCutaneous every 24 hours    Cardiac Enzymes:         -------------------------------------------------------------------------------------------  Meds:    -------------------------------------------------------------------------------------------  Cardiovascular Diagnostic Testing:    ECG:     Echo:   < from: Transthoracic Echocardiogram (07.31.18 @ 08:47) >   1. Left ventricular ejection fraction, by visual estimation, is 55 to   60%.   2. LV Ejection Fraction by Brown's Method with a biplane EF of 59 %.   3. Spectral Doppler shows impaired relaxation pattern of left   ventricular myocardial filling (Grade I diastolic dysfunction).   4. Mildly enlarged left atrium.   5. Normal right atrial size.   6. Mild mitral annular calcification.   7. Thickening of the anterior and posterior mitral valve leaflets.   8. Cannot exclude a vegetation on atrial aspect of PMVL.   9. Trace tricuspid regurgitation.  10. No evidence of aortic stenosis.    < end of copied text >  GAMALIEL findings:  LVEF - nl  no vegetations seen   Gamaliel was aborted sec to technical difficulties with the probe  Stress Testing:    Cath:    Imaging:    CXR:  reviewed  -------------------------------------------------------------------------------------------  Problems Assessed:   1. AMS  - neurology following  2. CAD- no angina, stable.   3. HTN- elevated.   4. HLD   5. CVA  6. CIWA protocol     Plan:   • Continue DAPT   • Continue statin   • Continue atenolol and enalapril at current dose. May increase Enalapril tomorrow if BP persistently elevated  • Obtain echocardiogram     Janusz Perry MD   of Cardiology  [facility] at [facility]  [address], [address]  [place] [address]  Phone: [phone]  Fax: [phone]  -------------------------------------------------------------------------------------------  Billing Statement:   56  minutes spent on total encounter. Necessity of time spent during this encounter on this date of service was due to review of medical information in EMR, co-ordination of hospital and outpatient care, discussion with patient and communication with primary team.   -------------------------------------------------------------------------------------------   Cardiology Consult Note   [Please check [email or web address] password: "[email or web address]" for cardiology service schedule and contact information]    History of Present Illness:     73 yo M with pmhx of CVA, PAD, CAD, DM, HTN that is zane in for weakness neuro called for latered mental status. Patient at bedside a very poor historian, stating that today started feeling unwell generally for which took a medication (patient could not recall ) and per ED note took nyquil and after taking the medication patient could not recall what happened after. Per ED note after taking the medication patient started acting confused and genralized weak. Patient states that 4 days ago was with his doctor that was checking his eye and was told to go the ER. Patient went to [facility] and was treated for a stroke with medications and after was discharged home.    PAST MEDICAL & SURGICAL HISTORY:  Coronary artery disease  s/p CABG      Cerebrovascular accident (CVA) with involvement of right side of body      BPH (benign prostatic hyperplasia)      Stented coronary artery      PVD (peripheral vascular disease)      HLD (hyperlipidemia)      HTN (hypertension)      Vertebral osteomyelitis      Thrombocytopenia      History of coronary artery bypass surgery      S/P femoral-tibial bypass      History of fusion of cervical spine        FAMILY HISTORY:  Family history of heart disease (Mother)    FH: hypertension (Mother)    FHx: prostate cancer (Father)      SOCIAL HISTORY:  unchanged    MEDICATIONS:  aspirin  chewable 81 milliGRAM(s) Oral daily  atenolol  Tablet 50 milliGRAM(s) Oral daily  clopidogrel Tablet 75 milliGRAM(s) Oral daily  enalapril 10 milliGRAM(s) Oral daily  enoxaparin Injectable 40 milliGRAM(s) SubCutaneous every 24 hours    oseltamivir 75 milliGRAM(s) Oral two times a day      acetaminophen     Tablet .. 650 milliGRAM(s) Oral every 6 hours PRN  LORazepam   Injectable 1 milliGRAM(s) IV Push every 2 hours PRN  ondansetron Injectable 4 milliGRAM(s) IV Push every 8 hours PRN  sertraline 25 milliGRAM(s) Oral daily      atorvastatin 80 milliGRAM(s) Oral at bedtime  ezetimibe 10 milliGRAM(s) Oral daily  insulin lispro (ADMELOG) corrective regimen sliding scale   SubCutaneous three times a day before meals  insulin lispro (ADMELOG) corrective regimen sliding scale   SubCutaneous at bedtime    artificial  tears Solution 1 Drop(s) Both EYES three times a day  folic acid 1 milliGRAM(s) Oral daily  influenza  Vaccine (HIGH DOSE) 0.5 milliLiter(s) IntraMuscular once  multivitamin 1 Tablet(s) Oral daily  potassium chloride  10 mEq/100 mL IVPB 10 milliEquivalent(s) IV Intermittent every 1 hour  tamsulosin 0.4 milliGRAM(s) Oral at bedtime  thiamine 100 milliGRAM(s) Oral daily      REVIEW OF SYSTEMS:  CV: chest pain (-), palpitation (-), orthopnea (-), PND (-), edema (-)  PULM: SOB (-), cough (-), wheezing (-), hemoptysis (-).   CONST: fever (-), chills (-) or fatigue (-)  GI: abdominal distension (-), abdominal pain (-) , nausea/vomiting (-), hematemesis, (-), melena (-), hematochezia (-)  : dysuria (-), frequency (-), hematuria (-).   NEURO: numbness (-), weakness (-), dizziness (-)  SKIN: itching (-), rash (-)  HEENT:  visual changes (-); vertigo or throat pain (-);  neck stiffness (-)     All other review of systems is negative unless indicated above.   -------------------------------------------------------------------------------------------  PHYSICAL EXAM:  T(C): 36.5 (02-03-25 @ 07:48), Max: 37.3 (02-02-25 @ 23:40)  HR: 71 (02-03-25 @ 07:48) (71 - 93)  BP: 167/93 (02-03-25 @ 07:48) (165/77 - 177/92)  RR: 18 (02-03-25 @ 07:48) (18 - 18)  SpO2: 93% (02-03-25 @ 07:48) (93% - 100%)  Wt(kg): --  I&O's Summary    02 Feb 2025 07:01  -  03 Feb 2025 07:00  --------------------------------------------------------  IN: 0 mL / OUT: 275 mL / NET: -275 mL      GENERAL: NAD  HEAD:  Atraumatic, Normocephalic  EYES:  conjunctiva and sclera clear, red conjunctiva in the R eye, no discharge noted, watery discharge on both eyes   NECK: Supple, No JVD, Normal thyroid  CHEST/LUNG: Clear to auscultation. Clear to percussion bilaterally; No rales, rhonchi, wheezing, or rubs  HEART: Regular rate and rhythm; No murmurs, rubs, or gallops  ABDOMEN: Soft, Nontender, Nondistended; Bowel sounds present  NERVOUS SYSTEM:  Alert & Oriented X2, no focal neurological deficit    EXTREMITIES:  2+ Peripheral Pulses, No clubbing, cyanosis, or edema  SKIN: warm dry      -------------------------------------------------------------------------------------------  LABS:  02-03    141  |  108  |  15  ----------------------------<  165[H]  3.3[L]   |  26  |  0.76    Ca    8.9      03 Feb 2025 06:59  Phos  2.3     02-03  Mg     2.1     02-03      Creatinine Trend: 0.76<--, 0.75<--, 0.87<--, 1.11<--, 1.00<--                        15.7   7.52  )-----------( 76       ( 03 Feb 2025 06:59 )             44.8     PT/INR - ( 03 Feb 2025 06:59 )   PT: 12.8 sec;   INR: 1.10 ratio         PTT - ( 03 Feb 2025 06:59 )  PTT:29.7 sec    Lipid Panel: aspirin  chewable 81 milliGRAM(s) Oral daily  atenolol  Tablet 50 milliGRAM(s) Oral daily  clopidogrel Tablet 75 milliGRAM(s) Oral daily  enalapril 10 milliGRAM(s) Oral daily  enoxaparin Injectable 40 milliGRAM(s) SubCutaneous every 24 hours    Cardiac Enzymes:         -------------------------------------------------------------------------------------------  Meds:    -------------------------------------------------------------------------------------------  Cardiovascular Diagnostic Testing:    ECG:     Echo:   < from: Transthoracic Echocardiogram (07.31.18 @ 08:47) >   1. Left ventricular ejection fraction, by visual estimation, is 55 to   60%.   2. LV Ejection Fraction by Brown's Method with a biplane EF of 59 %.   3. Spectral Doppler shows impaired relaxation pattern of left   ventricular myocardial filling (Grade I diastolic dysfunction).   4. Mildly enlarged left atrium.   5. Normal right atrial size.   6. Mild mitral annular calcification.   7. Thickening of the anterior and posterior mitral valve leaflets.   8. Cannot exclude a vegetation on atrial aspect of PMVL.   9. Trace tricuspid regurgitation.  10. No evidence of aortic stenosis.    < end of copied text >  GAMALIEL findings:  LVEF - nl  no vegetations seen   Gamaliel was aborted sec to technical difficulties with the probe  Stress Testing:    Cath:    Imaging:    CXR:  reviewed  -------------------------------------------------------------------------------------------  Problems Assessed:   1. AMS  - neurology following  2. CAD- no angina, stable.   3. HTN- elevated.   4. HLD   5. CVA  6. CIWA protocol     Plan:   • Continue DAPT   • Continue statin   • Continue atenolol and enalapril at current dose. May increase Enalapril tomorrow if BP persistently elevated  • Obtain echocardiogram     Janusz Perry MD   of Cardiology  [facility] at [facility]  [address], [address]  [place] [address]  Phone: [phone]  Fax: [phone]  -------------------------------------------------------------------------------------------  Billing Statement:   56  minutes spent on total encounter. Necessity of time spent during this encounter on this date of service was due to review of medical information in EMR, co-ordination of hospital and outpatient care, discussion with patient and communication with primary team.   -------------------------------------------------------------------------------------------

## 2025-02-03 NOTE — PROGRESS NOTE ADULT - SUBJECTIVE AND OBJECTIVE BOX
PGY-1 Progress Note discussed with attending    PAGER #: [ Teams ] TILL 5:00 PM  PLEASE CONTACT ON CALL TEAM:  - On Call Team (Please refer to [email or web address]) FROM 5:00 PM - 8:30PM  - Nightfloat Team FROM 8:30 -7:30 AM    CHIEF COMPLAINT & BRIEF HOSPITAL COURSE: Patient is a 74y old  Male who presents with a chief complaint of AMS (03 Feb 2025 17:49)      INTERVAL HPI/OVERNIGHT EVENTS: Patient seen at bedside this AM. Initially unarousable, responded to painful stimuli with non-purposeful movements. Patient reassessed later in the morning and afternoon, AAOx2 but forgetful (baseline mental status per son) using Polish  ID#[id number] Fernando. No acute complaints however patient is a poor historian and believes he is here for alcohol. Follows commands.      REVIEW OF SYSTEMS:  CONSTITUTIONAL: No fever  RESPIRATORY: No cough, wheezing, chills or hemoptysis; No shortness of breath  CARDIOVASCULAR: No chest pain, palpitations, dizziness, or leg swelling  GASTROINTESTINAL: No abdominal pain. No nausea, vomiting, or hematemesis; No diarrhea or constipation. No melena or hematochezia.  GENITOURINARY: No dysuria or hematuria, urinary frequency  NEUROLOGICAL: No headaches  SKIN: No itching, burning, rashes, or lesions     Vital Signs Last 24 Hrs  T(C): 36.6 (03 Feb 2025 15:23), Max: 37.3 (02 Feb 2025 23:40)  T(F): 97.9 (03 Feb 2025 15:23), Max: 99.1 (02 Feb 2025 23:40)  HR: 64 (03 Feb 2025 15:23) (64 - 93)  BP: 158/84 (03 Feb 2025 15:23) (158/84 - 177/92)  BP(mean): 104 (03 Feb 2025 04:50) (104 - 115)  RR: 18 (03 Feb 2025 15:23) (18 - 18)  SpO2: 97% (03 Feb 2025 15:23) (93% - 100%)    Parameters below as of 03 Feb 2025 15:23  Patient On (Oxygen Delivery Method): room air        PHYSICAL EXAMINATION:  GENERAL: NAD, well built  HEAD:  Atraumatic, Normocephalic  EYES:  conjunctiva and sclera clear  NECK: Supple, No JVD,   CHEST/LUNG: Clear to auscultation.No rales, rhonchi, wheezing, or rubs  HEART: Regular rate and rhythm; No murmurs, rubs, or gallops  ABDOMEN: Soft, Nontender, Nondistended; Bowel sounds present  NERVOUS SYSTEM:  Alert & Oriented X2,  strength, tone, motor and sensation intact. Unable to complete H Test. CN V, VII, XI-XII intact and assessed.  EXTREMITIES:  2+ Peripheral Pulses, No clubbing, cyanosis, or edema  SKIN: warm dry                          15.7   7.52  )-----------( 76       ( 03 Feb 2025 06:59 )             44.8     02-03    141  |  108  |  15  ----------------------------<  165[H]  3.3[L]   |  26  |  0.76    Ca    8.9      03 Feb 2025 06:59  Phos  2.3     02-03  Mg     2.1     02-03            PT/INR - ( 03 Feb 2025 06:59 )   PT: 12.8 sec;   INR: 1.10 ratio         PTT - ( 03 Feb 2025 06:59 )  PTT:29.7 sec    CAPILLARY BLOOD GLUCOSE      RADIOLOGY & ADDITIONAL TESTS:

## 2025-02-04 ENCOUNTER — RESULT REVIEW (OUTPATIENT)
Age: 75
End: 2025-02-04

## 2025-02-04 LAB
ANION GAP SERPL CALC-SCNC: 6 MMOL/L — SIGNIFICANT CHANGE UP (ref 5–17)
BUN SERPL-MCNC: 14 MG/DL — SIGNIFICANT CHANGE UP (ref 7–18)
CALCIUM SERPL-MCNC: 8.8 MG/DL — SIGNIFICANT CHANGE UP (ref 8.4–10.5)
CHLORIDE SERPL-SCNC: 111 MMOL/L — HIGH (ref 96–108)
CO2 SERPL-SCNC: 26 MMOL/L — SIGNIFICANT CHANGE UP (ref 22–31)
CREAT SERPL-MCNC: 0.78 MG/DL — SIGNIFICANT CHANGE UP (ref 0.5–1.3)
EGFR: 95 ML/MIN/1.73M2 — SIGNIFICANT CHANGE UP
FOLATE SERPL-MCNC: 12.3 NG/ML — SIGNIFICANT CHANGE UP
GLUCOSE BLDC GLUCOMTR-MCNC: 130 MG/DL — HIGH (ref 70–99)
GLUCOSE BLDC GLUCOMTR-MCNC: 148 MG/DL — HIGH (ref 70–99)
GLUCOSE BLDC GLUCOMTR-MCNC: 168 MG/DL — HIGH (ref 70–99)
GLUCOSE BLDC GLUCOMTR-MCNC: 209 MG/DL — HIGH (ref 70–99)
GLUCOSE SERPL-MCNC: 130 MG/DL — HIGH (ref 70–99)
HCT VFR BLD CALC: 40.7 % — SIGNIFICANT CHANGE UP (ref 39–50)
HGB BLD-MCNC: 14.2 G/DL — SIGNIFICANT CHANGE UP (ref 13–17)
HIV 1+2 AB+HIV1 P24 AG SERPL QL IA: SIGNIFICANT CHANGE UP
MAGNESIUM SERPL-MCNC: 2 MG/DL — SIGNIFICANT CHANGE UP (ref 1.6–2.6)
MCHC RBC-ENTMCNC: 34.2 PG — HIGH (ref 27–34)
MCHC RBC-ENTMCNC: 34.9 G/DL — SIGNIFICANT CHANGE UP (ref 32–36)
MCV RBC AUTO: 98.1 FL — SIGNIFICANT CHANGE UP (ref 80–100)
NRBC # BLD: 0 /100 WBCS — SIGNIFICANT CHANGE UP (ref 0–0)
NRBC BLD-RTO: 0 /100 WBCS — SIGNIFICANT CHANGE UP (ref 0–0)
PHOSPHATE SERPL-MCNC: 2.5 MG/DL — SIGNIFICANT CHANGE UP (ref 2.5–4.5)
PLATELET # BLD AUTO: 79 K/UL — LOW (ref 150–400)
POTASSIUM SERPL-MCNC: 3.6 MMOL/L — SIGNIFICANT CHANGE UP (ref 3.5–5.3)
POTASSIUM SERPL-SCNC: 3.6 MMOL/L — SIGNIFICANT CHANGE UP (ref 3.5–5.3)
RBC # BLD: 4.15 M/UL — LOW (ref 4.2–5.8)
RBC # FLD: 13.2 % — SIGNIFICANT CHANGE UP (ref 10.3–14.5)
SODIUM SERPL-SCNC: 143 MMOL/L — SIGNIFICANT CHANGE UP (ref 135–145)
TSH SERPL-MCNC: 1.39 UU/ML — SIGNIFICANT CHANGE UP (ref 0.34–4.82)
VIT B12 SERPL-MCNC: >2000 PG/ML — HIGH (ref 232–1245)
VIT D25+D1,25 OH+D1,25 PNL SERPL-MCNC: 80.1 PG/ML — HIGH (ref 19.9–79.3)
WBC # BLD: 7.94 K/UL — SIGNIFICANT CHANGE UP (ref 3.8–10.5)
WBC # FLD AUTO: 7.94 K/UL — SIGNIFICANT CHANGE UP (ref 3.8–10.5)

## 2025-02-04 PROCEDURE — 95816 EEG AWAKE AND DROWSY: CPT | Mod: 26

## 2025-02-04 PROCEDURE — 76700 US EXAM ABDOM COMPLETE: CPT | Mod: 26

## 2025-02-04 PROCEDURE — 99233 SBSQ HOSP IP/OBS HIGH 50: CPT | Mod: GC

## 2025-02-04 PROCEDURE — 99232 SBSQ HOSP IP/OBS MODERATE 35: CPT

## 2025-02-04 RX ORDER — ENALAPRIL MALEATE 20 MG
20 TABLET ORAL DAILY
Refills: 0 | Status: DISCONTINUED | OUTPATIENT
Start: 2025-02-05 | End: 2025-02-06

## 2025-02-04 RX ORDER — ENALAPRIL MALEATE 20 MG
10 TABLET ORAL ONCE
Refills: 0 | Status: COMPLETED | OUTPATIENT
Start: 2025-02-04 | End: 2025-02-04

## 2025-02-04 RX ORDER — HYDRALAZINE HCL 100 MG
5 TABLET ORAL ONCE
Refills: 0 | Status: COMPLETED | OUTPATIENT
Start: 2025-02-04 | End: 2025-02-04

## 2025-02-04 RX ADMIN — Medication 105 MILLIGRAM(S): at 21:53

## 2025-02-04 RX ADMIN — Medication 1 DROP(S): at 22:08

## 2025-02-04 RX ADMIN — Medication 75 MILLIGRAM(S): at 12:17

## 2025-02-04 RX ADMIN — ATORVASTATIN CALCIUM 80 MILLIGRAM(S): 80 TABLET, FILM COATED ORAL at 21:53

## 2025-02-04 RX ADMIN — Medication 10 MILLIGRAM(S): at 12:18

## 2025-02-04 RX ADMIN — Medication 25 MILLIGRAM(S): at 12:17

## 2025-02-04 RX ADMIN — Medication 5 MILLIGRAM(S): at 23:49

## 2025-02-04 RX ADMIN — Medication 10 MILLIGRAM(S): at 12:17

## 2025-02-04 RX ADMIN — TAMSULOSIN HYDROCHLORIDE 0.4 MILLIGRAM(S): 0.4 CAPSULE ORAL at 21:53

## 2025-02-04 RX ADMIN — Medication 105 MILLIGRAM(S): at 14:32

## 2025-02-04 RX ADMIN — Medication 1 DROP(S): at 06:03

## 2025-02-04 RX ADMIN — OSELTAMIVIR PHOSPHATE 75 MILLIGRAM(S): 75 CAPSULE ORAL at 06:03

## 2025-02-04 RX ADMIN — Medication 1: at 17:38

## 2025-02-04 RX ADMIN — Medication 50 MILLIGRAM(S): at 06:03

## 2025-02-04 RX ADMIN — Medication 1 TABLET(S): at 12:18

## 2025-02-04 RX ADMIN — ENOXAPARIN SODIUM 40 MILLIGRAM(S): 100 INJECTION SUBCUTANEOUS at 06:03

## 2025-02-04 RX ADMIN — Medication 1 MILLIGRAM(S): at 16:29

## 2025-02-04 RX ADMIN — Medication 10 MILLIGRAM(S): at 06:03

## 2025-02-04 RX ADMIN — OSELTAMIVIR PHOSPHATE 75 MILLIGRAM(S): 75 CAPSULE ORAL at 17:37

## 2025-02-04 RX ADMIN — Medication 1 MILLIGRAM(S): at 12:18

## 2025-02-04 RX ADMIN — Medication 105 MILLIGRAM(S): at 06:04

## 2025-02-04 RX ADMIN — ASPIRIN 81 MILLIGRAM(S): 81 TABLET, COATED ORAL at 12:18

## 2025-02-04 RX ADMIN — Medication 1 DROP(S): at 14:32

## 2025-02-04 NOTE — PROGRESS NOTE ADULT - SUBJECTIVE AND OBJECTIVE BOX
Cardiology Progress Note  ------------------------------------------------------------------------------------------  SUBJECTIVE:   No events overnight. Denies CP, SOB or Palpitations.   -------------------------------------------------------------------------------------------  ROS:  CV: chest pain (-), palpitation (-), orthopnea (-), PND (-), edema (-)  PULM: SOB (-), cough (-), wheezing (-), hemoptysis (-).   CONST: fever (-), chills (-) or fatigue (-)  GI: abdominal distension (-), abdominal pain (-) , nausea/vomiting (-), hematemesis, (-), melena (-), hematochezia (-)  : dysuria (-), frequency (-), hematuria (-).   NEURO: numbness (-), weakness (-), dizziness (-)  MSK: myalgia (-), joint pain (-)   SKIN: itching (-), rash (-)  HEENT:  visual changes (-); vertigo or throat pain (-);  neck stiffness (-)   Psych: change in mood (-), anxiety (-), depression (-)     All other review of systems is negative unless indicated above.   -------------------------------------------------------------------------------------------  VS:  T(F): 98.1 (02-04), Max: 98.1 (02-04)  HR: 67 (02-04) (64 - 75)  BP: 167/75 (02-04) (158/84 - 169/87)  RR: 18 (02-04)  SpO2: 95% (02-04)  I&O's Summary    03 Feb 2025 07:01  -  04 Feb 2025 07:00  --------------------------------------------------------  IN: 540 mL / OUT: 645 mL / NET: -105 mL      ------------------------------------------------------------------------------------------  PHYSICAL EXAM:  Vital Signs were reviewed.  GENERAL: NAD, well-appearing, well nourished.   HEAD:  Atraumatic, Normocephalic.  PERRLA, conjunctiva and sclera clear  ENT: Moist mucous membranes, no external lesions on nose.   NECK: Supple, JVP   CHEST/LUNG: No chest wall tenderness. CTAB  HEART: S1, S2, no murmur   ABDOMEN: Bowel sounds present; Soft, Nontender, Nondistended.   EXTREMITIES: No edema,   NERVOUS SYSTEM:  Alert & Oriented X3, speech clear. No deficits.   SKIN: No rashes or lesions.    -------------------------------------------------------------------------------------------  LABS:  02-04    143  |  111[H]  |  14  ----------------------------<  130[H]  3.6   |  26  |  0.78    Ca    8.8      04 Feb 2025 06:52  Phos  2.5     02-04  Mg     2.0     02-04      Creatinine Trend: 0.78<--, 0.76<--, 0.75<--, 0.87<--, 1.11<--, 1.00<--                        14.2   7.94  )-----------( 79       ( 04 Feb 2025 06:52 )             40.7     PT/INR - ( 03 Feb 2025 06:59 )   PT: 12.8 sec;   INR: 1.10 ratio         PTT - ( 03 Feb 2025 06:59 )  PTT:29.7 sec    Lipid Panel: T(F): 98.1 (02-04), Max: 98.1 (02-04)  HR: 67 (02-04) (64 - 75)  BP: 167/75 (02-04) (158/84 - 169/87)  RR: 18 (02-04)  SpO2: 95% (02-04)  Cardiac Enzymes:         -------------------------------------------------------------------------------------------  Meds:  acetaminophen     Tablet .. 650 milliGRAM(s) Oral every 6 hours PRN  artificial  tears Solution 1 Drop(s) Both EYES three times a day  aspirin  chewable 81 milliGRAM(s) Oral daily  atenolol  Tablet 50 milliGRAM(s) Oral daily  atorvastatin 80 milliGRAM(s) Oral at bedtime  clopidogrel Tablet 75 milliGRAM(s) Oral daily  enalapril 10 milliGRAM(s) Oral once  enoxaparin Injectable 40 milliGRAM(s) SubCutaneous every 24 hours  ezetimibe 10 milliGRAM(s) Oral daily  folic acid 1 milliGRAM(s) Oral daily  influenza  Vaccine (HIGH DOSE) 0.5 milliLiter(s) IntraMuscular once  insulin lispro (ADMELOG) corrective regimen sliding scale   SubCutaneous three times a day before meals  insulin lispro (ADMELOG) corrective regimen sliding scale   SubCutaneous at bedtime  LORazepam   Injectable 1 milliGRAM(s) IV Push every 2 hours PRN  multivitamin 1 Tablet(s) Oral daily  ondansetron Injectable 4 milliGRAM(s) IV Push every 8 hours PRN  oseltamivir 75 milliGRAM(s) Oral two times a day  sertraline 25 milliGRAM(s) Oral daily  tamsulosin 0.4 milliGRAM(s) Oral at bedtime  thiamine IVPB 500 milliGRAM(s) IV Intermittent every 8 hours    -------------------------------------------------------------------------------------------  Cardiovascular Diagnostic Testing:  -------------------------------------------------------------------------------------------  ECG:     Echo:     Stress Testing:    Cath:    Imaging:    CXR:  reviewed  -------------------------------------------------------------------------------------------  Assessment and Plan:   -------------------------------------------------------------------------------------------  Problems Assessed:   1.  2.  3.  4.    Plan:   •  •  •  •     Cardiology Progress Note  ------------------------------------------------------------------------------------------  SUBJECTIVE:   No events overnight. Denies CP, SOB or Palpitations. Patient understands English, however confused    -------------------------------------------------------------------------------------------  ROS:  CV: chest pain (-), palpitation (-), orthopnea (-), PND (-), edema (-)  PULM: SOB (-), cough (-), wheezing (-), hemoptysis (-).   CONST: fever (-), chills (-) or fatigue (-)  GI: abdominal distension (-), abdominal pain (-) , nausea/vomiting (-), hematemesis, (-), melena (-), hematochezia (-)  : dysuria (-), frequency (-), hematuria (-).   NEURO: numbness (-), weakness (-), dizziness (-)  MSK: myalgia (-), joint pain (-)   SKIN: itching (-), rash (-)  HEENT:  visual changes (-); vertigo or throat pain (-);  neck stiffness (-)   Psych: change in mood (-), anxiety (-), depression (-)     All other review of systems is negative unless indicated above.   -------------------------------------------------------------------------------------------  VS:  T(F): 98.1 (02-04), Max: 98.1 (02-04)  HR: 67 (02-04) (64 - 75)  BP: 167/75 (02-04) (158/84 - 169/87)  RR: 18 (02-04)  SpO2: 95% (02-04)  I&O's Summary    03 Feb 2025 07:01  -  04 Feb 2025 07:00  --------------------------------------------------------  IN: 540 mL / OUT: 645 mL / NET: -105 mL      ------------------------------------------------------------------------------------------  PHYSICAL EXAM:  Vital Signs were reviewed.  GENERAL: NAD, well-appearing, well nourished.   HEAD:  Atraumatic, Normocephalic.  PERRLA, conjunctiva and sclera clear  ENT: Moist mucous membranes, no external lesions on nose.   NECK: Supple, JVP   CHEST/LUNG: No chest wall tenderness. CTAB  HEART: S1, S2, no murmur   ABDOMEN: Bowel sounds present; Soft, Nontender, Nondistended.   EXTREMITIES: No edema,   NERVOUS SYSTEM:  Alert & Oriented X2. No deficits.   SKIN: No rashes or lesions.    -------------------------------------------------------------------------------------------  LABS:  02-04    143  |  111[H]  |  14  ----------------------------<  130[H]  3.6   |  26  |  0.78    Ca    8.8      04 Feb 2025 06:52  Phos  2.5     02-04  Mg     2.0     02-04      Creatinine Trend: 0.78<--, 0.76<--, 0.75<--, 0.87<--, 1.11<--, 1.00<--                        14.2   7.94  )-----------( 79       ( 04 Feb 2025 06:52 )             40.7     PT/INR - ( 03 Feb 2025 06:59 )   PT: 12.8 sec;   INR: 1.10 ratio         PTT - ( 03 Feb 2025 06:59 )  PTT:29.7 sec    Lipid Panel: T(F): 98.1 (02-04), Max: 98.1 (02-04)  HR: 67 (02-04) (64 - 75)  BP: 167/75 (02-04) (158/84 - 169/87)  RR: 18 (02-04)  SpO2: 95% (02-04)  Cardiac Enzymes:         -------------------------------------------------------------------------------------------  Meds:  acetaminophen     Tablet .. 650 milliGRAM(s) Oral every 6 hours PRN  artificial  tears Solution 1 Drop(s) Both EYES three times a day  aspirin  chewable 81 milliGRAM(s) Oral daily  atenolol  Tablet 50 milliGRAM(s) Oral daily  atorvastatin 80 milliGRAM(s) Oral at bedtime  clopidogrel Tablet 75 milliGRAM(s) Oral daily  enalapril 10 milliGRAM(s) Oral once  enoxaparin Injectable 40 milliGRAM(s) SubCutaneous every 24 hours  ezetimibe 10 milliGRAM(s) Oral daily  folic acid 1 milliGRAM(s) Oral daily  influenza  Vaccine (HIGH DOSE) 0.5 milliLiter(s) IntraMuscular once  insulin lispro (ADMELOG) corrective regimen sliding scale   SubCutaneous three times a day before meals  insulin lispro (ADMELOG) corrective regimen sliding scale   SubCutaneous at bedtime  LORazepam   Injectable 1 milliGRAM(s) IV Push every 2 hours PRN  multivitamin 1 Tablet(s) Oral daily  ondansetron Injectable 4 milliGRAM(s) IV Push every 8 hours PRN  oseltamivir 75 milliGRAM(s) Oral two times a day  sertraline 25 milliGRAM(s) Oral daily  tamsulosin 0.4 milliGRAM(s) Oral at bedtime  thiamine IVPB 500 milliGRAM(s) IV Intermittent every 8 hours    -------------------------------------------------------------------------------------------  Cardiovascular Diagnostic Testing:  -------------------------------------------------------------------------------------------  Telemetry NS HR range 50-70    Echo: pending      Imaging:< from: US Duplex Carotid Arteries Complete, Bilateral (02.03.25 @ 13:39) >  IMPRESSION:    Although there is prominent calcified atheromatous plaque at the   bifurcations, this examination does not confirm the presence of a high   grade stenosis of the right internal carotid artery.    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      Measurement of carotid stenosis is based on updated recommendations for   carotid stenosis interpretation criteria from the Intersocietal   Accreditation Commission (IAC) Vascular Testing Board, modified from the   Society of Radiologists in Ultrasound (SRU) Consensus Conference Criteria   for Internal Carotid Artery Stenosis.    --- End of Report ---        < end of copied text >  < from: MR Angio Head No Cont (02.03.25 @ 11:52) >  IMPRESSION:    1.  BRAIN:   Left posterior hippocampal formation acute infarction. Small   cerebral hemispheric white matter basal ganglia thalamic and brainstem   remote infarctions. Ischemic white matter disease and atrophy upper range   typical for age    2.  ANTERIOR CIRCULATION:    Patent inflow. Patient motion limited exam    3. POSTERIOR CIRCULATION:  Patient motion limits the exam    --- End of Report ---        < end of copied text >  < from: Xray Chest 1 View- PORTABLE-Urgent (Xray Chest 1 View- PORTABLE-Urgent .) (02.01.25 @ 00:46) >  IMPRESSION: No acute parenchymal disease, no change allowing for technique    < end of copied text >      CXR:  reviewed  -------------------------------------------------------------------------------------------  Assessment and Plan:   -------------------------------------------------------------------------------------------  73 yo M with pmhx of CVA, PAD, CAD, DM, HTN that is zane in for weakness. Patient to be admitted for acute encephalopathy due to infection vs recent stroke vs medication side effect. Cardiology was consulted    Problems Assessed:   1. AMS- neurology following  2. CAD- no angina, stable.   3. HTN- elevated.   4. HLD   5. CVA  6. CIWA protocol     Plan:   • Continue DAPT   • Continue statin   • Continue atenolol, Enalapril dose was increased to 20mg PO daily today  • Obtain echocardiogram    Cardiology Progress Note  ------------------------------------------------------------------------------------------  SUBJECTIVE:   No events overnight. Denies CP, SOB or Palpitations. Patient understands English, however confused    -------------------------------------------------------------------------------------------  ROS:  CV: chest pain (-), palpitation (-), orthopnea (-), PND (-), edema (-)  PULM: SOB (-), cough (-), wheezing (-), hemoptysis (-).   CONST: fever (-), chills (-) or fatigue (-)  GI: abdominal distension (-), abdominal pain (-) , nausea/vomiting (-), hematemesis, (-), melena (-), hematochezia (-)  : dysuria (-), frequency (-), hematuria (-).   NEURO: numbness (-), weakness (-), dizziness (-)  MSK: myalgia (-), joint pain (-)   SKIN: itching (-), rash (-)  HEENT:  visual changes (-); vertigo or throat pain (-);  neck stiffness (-)   Psych: change in mood (-), anxiety (-), depression (-)     All other review of systems is negative unless indicated above.   -------------------------------------------------------------------------------------------  VS:  T(F): 98.1 (02-04), Max: 98.1 (02-04)  HR: 67 (02-04) (64 - 75)  BP: 167/75 (02-04) (158/84 - 169/87)  RR: 18 (02-04)  SpO2: 95% (02-04)  I&O's Summary    03 Feb 2025 07:01  -  04 Feb 2025 07:00  --------------------------------------------------------  IN: 540 mL / OUT: 645 mL / NET: -105 mL      ------------------------------------------------------------------------------------------  PHYSICAL EXAM:  Vital Signs were reviewed.  GENERAL: NAD, well-appearing, well nourished.   HEAD:  Atraumatic, Normocephalic.  PERRLA, conjunctiva and sclera clear  ENT: Moist mucous membranes, no external lesions on nose.   NECK: Supple, JVP   CHEST/LUNG: No chest wall tenderness. CTAB  HEART: S1, S2, no murmur   ABDOMEN: Bowel sounds present; Soft, Nontender, Nondistended.   EXTREMITIES: No edema,   NERVOUS SYSTEM:  Alert & Oriented X2. No deficits.   SKIN: No rashes or lesions.    -------------------------------------------------------------------------------------------  LABS:  02-04    143  |  111[H]  |  14  ----------------------------<  130[H]  3.6   |  26  |  0.78    Ca    8.8      04 Feb 2025 06:52  Phos  2.5     02-04  Mg     2.0     02-04      Creatinine Trend: 0.78<--, 0.76<--, 0.75<--, 0.87<--, 1.11<--, 1.00<--                        14.2   7.94  )-----------( 79       ( 04 Feb 2025 06:52 )             40.7     PT/INR - ( 03 Feb 2025 06:59 )   PT: 12.8 sec;   INR: 1.10 ratio         PTT - ( 03 Feb 2025 06:59 )  PTT:29.7 sec    Lipid Panel: T(F): 98.1 (02-04), Max: 98.1 (02-04)  HR: 67 (02-04) (64 - 75)  BP: 167/75 (02-04) (158/84 - 169/87)  RR: 18 (02-04)  SpO2: 95% (02-04)  Cardiac Enzymes:         -------------------------------------------------------------------------------------------  Meds:  acetaminophen     Tablet .. 650 milliGRAM(s) Oral every 6 hours PRN  artificial  tears Solution 1 Drop(s) Both EYES three times a day  aspirin  chewable 81 milliGRAM(s) Oral daily  atenolol  Tablet 50 milliGRAM(s) Oral daily  atorvastatin 80 milliGRAM(s) Oral at bedtime  clopidogrel Tablet 75 milliGRAM(s) Oral daily  enalapril 10 milliGRAM(s) Oral once  enoxaparin Injectable 40 milliGRAM(s) SubCutaneous every 24 hours  ezetimibe 10 milliGRAM(s) Oral daily  folic acid 1 milliGRAM(s) Oral daily  influenza  Vaccine (HIGH DOSE) 0.5 milliLiter(s) IntraMuscular once  insulin lispro (ADMELOG) corrective regimen sliding scale   SubCutaneous three times a day before meals  insulin lispro (ADMELOG) corrective regimen sliding scale   SubCutaneous at bedtime  LORazepam   Injectable 1 milliGRAM(s) IV Push every 2 hours PRN  multivitamin 1 Tablet(s) Oral daily  ondansetron Injectable 4 milliGRAM(s) IV Push every 8 hours PRN  oseltamivir 75 milliGRAM(s) Oral two times a day  sertraline 25 milliGRAM(s) Oral daily  tamsulosin 0.4 milliGRAM(s) Oral at bedtime  thiamine IVPB 500 milliGRAM(s) IV Intermittent every 8 hours    -------------------------------------------------------------------------------------------  Cardiovascular Diagnostic Testing:  -------------------------------------------------------------------------------------------  Telemetry NS HR range 50-70    Echo: pending      Imaging:< from: US Duplex Carotid Arteries Complete, Bilateral (02.03.25 @ 13:39) >  IMPRESSION:    Although there is prominent calcified atheromatous plaque at the   bifurcations, this examination does not confirm the presence of a high   grade stenosis of the right internal carotid artery.    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      Measurement of carotid stenosis is based on updated recommendations for   carotid stenosis interpretation criteria from the Intersocietal   Accreditation Commission (IAC) Vascular Testing Board, modified from the   Society of Radiologists in Ultrasound (SRU) Consensus Conference Criteria   for Internal Carotid Artery Stenosis.    --- End of Report ---        < end of copied text >  < from: MR Angio Head No Cont (02.03.25 @ 11:52) >  IMPRESSION:    1.  BRAIN:   Left posterior hippocampal formation acute infarction. Small   cerebral hemispheric white matter basal ganglia thalamic and brainstem   remote infarctions. Ischemic white matter disease and atrophy upper range   typical for age    2.  ANTERIOR CIRCULATION:    Patent inflow. Patient motion limited exam    3. POSTERIOR CIRCULATION:  Patient motion limits the exam    --- End of Report ---        < end of copied text >  < from: Xray Chest 1 View- PORTABLE-Urgent (Xray Chest 1 View- PORTABLE-Urgent .) (02.01.25 @ 00:46) >  IMPRESSION: No acute parenchymal disease, no change allowing for technique    < end of copied text >      CXR:  reviewed  -------------------------------------------------------------------------------------------  Assessment and Plan:   -------------------------------------------------------------------------------------------  73 yo M with pmhx of CVA, PAD, CAD, DM, HTN that is zane in for weakness. Patient to be admitted for acute encephalopathy due to infection vs recent stroke vs medication side effect. Cardiology was consulted    Problems Assessed:   1. AMS- neurology following  2. CAD- no angina, stable.   3. HTN- elevated.   4. HLD   5. CVA  6. CIWA protocol     Plan:   • Continue DAPT   • Continue statin   • Continue atenolol, Enalapril dose was increased to 20mg PO daily today  • Obtain echocardiogram   •EP eval for ILR placement upon discharge Cardiology Progress Note  ------------------------------------------------------------------------------------------  SUBJECTIVE:   No events overnight. Denies CP, SOB or Palpitations. Patient understands English, however confused    -------------------------------------------------------------------------------------------  ROS:  CV: chest pain (-), palpitation (-), orthopnea (-), PND (-), edema (-)  PULM: SOB (-), cough (-), wheezing (-), hemoptysis (-).   CONST: fever (-), chills (-) or fatigue (-)  GI: abdominal distension (-), abdominal pain (-) , nausea/vomiting (-), hematemesis, (-), melena (-), hematochezia (-)  : dysuria (-), frequency (-), hematuria (-).   NEURO: numbness (-), weakness (-), dizziness (-)  MSK: myalgia (-), joint pain (-)   SKIN: itching (-), rash (-)  HEENT:  visual changes (-); vertigo or throat pain (-);  neck stiffness (-)   Psych: change in mood (-), anxiety (-), depression (-)     All other review of systems is negative unless indicated above.   -------------------------------------------------------------------------------------------  VS:  T(F): 98.1 (02-04), Max: 98.1 (02-04)  HR: 67 (02-04) (64 - 75)  BP: 167/75 (02-04) (158/84 - 169/87)  RR: 18 (02-04)  SpO2: 95% (02-04)  I&O's Summary    03 Feb 2025 07:01  -  04 Feb 2025 07:00  --------------------------------------------------------  IN: 540 mL / OUT: 645 mL / NET: -105 mL      ------------------------------------------------------------------------------------------  PHYSICAL EXAM:  Vital Signs were reviewed.  GENERAL: NAD, well-appearing, well nourished.   HEAD:  Atraumatic, Normocephalic.  PERRLA, conjunctiva and sclera clear  ENT: Moist mucous membranes, no external lesions on nose.   NECK: Supple, JVP   CHEST/LUNG: No chest wall tenderness. CTAB  HEART: S1, S2, no murmur   ABDOMEN: Bowel sounds present; Soft, Nontender, Nondistended.   EXTREMITIES: No edema,   NERVOUS SYSTEM:  Alert & Oriented X2. No deficits.   SKIN: No rashes or lesions.    -------------------------------------------------------------------------------------------  LABS:  02-04    143  |  111[H]  |  14  ----------------------------<  130[H]  3.6   |  26  |  0.78    Ca    8.8      04 Feb 2025 06:52  Phos  2.5     02-04  Mg     2.0     02-04      Creatinine Trend: 0.78<--, 0.76<--, 0.75<--, 0.87<--, 1.11<--, 1.00<--                        14.2   7.94  )-----------( 79       ( 04 Feb 2025 06:52 )             40.7     PT/INR - ( 03 Feb 2025 06:59 )   PT: 12.8 sec;   INR: 1.10 ratio         PTT - ( 03 Feb 2025 06:59 )  PTT:29.7 sec    Lipid Panel: T(F): 98.1 (02-04), Max: 98.1 (02-04)  HR: 67 (02-04) (64 - 75)  BP: 167/75 (02-04) (158/84 - 169/87)  RR: 18 (02-04)  SpO2: 95% (02-04)  Cardiac Enzymes:         -------------------------------------------------------------------------------------------  Meds:  acetaminophen     Tablet .. 650 milliGRAM(s) Oral every 6 hours PRN  artificial  tears Solution 1 Drop(s) Both EYES three times a day  aspirin  chewable 81 milliGRAM(s) Oral daily  atenolol  Tablet 50 milliGRAM(s) Oral daily  atorvastatin 80 milliGRAM(s) Oral at bedtime  clopidogrel Tablet 75 milliGRAM(s) Oral daily  enalapril 10 milliGRAM(s) Oral once  enoxaparin Injectable 40 milliGRAM(s) SubCutaneous every 24 hours  ezetimibe 10 milliGRAM(s) Oral daily  folic acid 1 milliGRAM(s) Oral daily  influenza  Vaccine (HIGH DOSE) 0.5 milliLiter(s) IntraMuscular once  insulin lispro (ADMELOG) corrective regimen sliding scale   SubCutaneous three times a day before meals  insulin lispro (ADMELOG) corrective regimen sliding scale   SubCutaneous at bedtime  LORazepam   Injectable 1 milliGRAM(s) IV Push every 2 hours PRN  multivitamin 1 Tablet(s) Oral daily  ondansetron Injectable 4 milliGRAM(s) IV Push every 8 hours PRN  oseltamivir 75 milliGRAM(s) Oral two times a day  sertraline 25 milliGRAM(s) Oral daily  tamsulosin 0.4 milliGRAM(s) Oral at bedtime  thiamine IVPB 500 milliGRAM(s) IV Intermittent every 8 hours    -------------------------------------------------------------------------------------------  Cardiovascular Diagnostic Testing:  -------------------------------------------------------------------------------------------  Telemetry NS HR range 50-70    Echo: pending      Imaging:< from: US Duplex Carotid Arteries Complete, Bilateral (02.03.25 @ 13:39) >  IMPRESSION:    Although there is prominent calcified atheromatous plaque at the   bifurcations, this examination does not confirm the presence of a high   grade stenosis of the right internal carotid artery.    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      Measurement of carotid stenosis is based on updated recommendations for   carotid stenosis interpretation criteria from the Intersocietal   Accreditation Commission (IAC) Vascular Testing Board, modified from the   Society of Radiologists in Ultrasound (SRU) Consensus Conference Criteria   for Internal Carotid Artery Stenosis.    --- End of Report ---        < end of copied text >  < from: MR Angio Head No Cont (02.03.25 @ 11:52) >  IMPRESSION:    1.  BRAIN:   Left posterior hippocampal formation acute infarction. Small   cerebral hemispheric white matter basal ganglia thalamic and brainstem   remote infarctions. Ischemic white matter disease and atrophy upper range   typical for age    2.  ANTERIOR CIRCULATION:    Patent inflow. Patient motion limited exam    3. POSTERIOR CIRCULATION:  Patient motion limits the exam    --- End of Report ---        < end of copied text >  < from: Xray Chest 1 View- PORTABLE-Urgent (Xray Chest 1 View- PORTABLE-Urgent .) (02.01.25 @ 00:46) >  IMPRESSION: No acute parenchymal disease, no change allowing for technique    < end of copied text >      CXR:  reviewed  -------------------------------------------------------------------------------------------  Assessment and Plan:   -------------------------------------------------------------------------------------------  73 yo M with pmhx of CVA, PAD, CAD, DM, HTN that is zane in for weakness. Patient to be admitted for acute encephalopathy due to infection vs recent stroke vs medication side effect. Cardiology was consulted    Problems Assessed:   1. AMS- neurology following  2. CAD- no angina, stable.   3. HTN- elevated.   4. HLD   5. CVA  6. CIWA protocol     Plan:   • Continue DAPT   • Continue statin   • Continue atenolol, Enalapril dose was increased to 20mg PO daily today  • Obtain echocardiogram   •EP eval for ILR placement before discharge

## 2025-02-04 NOTE — SBIRT NOTE ADULT - NSSBIRTALCPOSREINDET_GEN_A_CORE
Pt reports drinking wine or vodka a few times a week. Pt stated when he has work he drinks less. SW inquired if family is concerned about how much alcohol pt consumes and he stated "everyone knows how much I drink and that's it". Pt declined needing SA resources. SW provided positive reinforcement.

## 2025-02-04 NOTE — PROGRESS NOTE ADULT - ATTENDING COMMENTS
Patient was seen and examined this afternoon; Polish  used'  Later this evening was noted to be delirious    Vital Signs Last 24 Hrs  T(C): 36.7 (04 Feb 2025 15:23), Max: 36.7 (03 Feb 2025 19:27)  T(F): 98 (04 Feb 2025 15:23), Max: 98.1 (04 Feb 2025 08:17)  HR: 67 (04 Feb 2025 15:23) (66 - 75)  BP: 167/74 (04 Feb 2025 15:23) (143/82 - 169/87)  RR: 19 (04 Feb 2025 15:23) (18 - 19)  SpO2: 96% (04 Feb 2025 15:23) (94% - 96%) room air      P/E:  NAD  AAOx2, does not follow all commands for full neuro exam  CTABL  S1S2 WNL  Abd soft, non tender, BS present   BLLE no edema or calf tenderness    Labs: reviewed as below                    14.2   7.94  )-----------( 79       ( 04 Feb 2025 06:52 )             40.7     02-04  143  |  111[H]  |  14  ----------------------------<  130[H]  3.6   |  26  |  0.78  Ca    8.8      04 Feb 2025 06:52  Phos  2.5     02-04  Mg     2.0     02-04      A/P:  Acute encephalopathy, possibly multifactorial from medication side effect while drinking alcohol, Influenza, recent CVA, alcohol withdrawal   Thrombocytopenia   Influenza   Hypokalemia   Hypoalbuminemia   PAD  CAD  DM  HTN    Plan:   Cont PRN Ativan for CIWA protocol  Cont Tamiflu  Avoid anticholinergics, benzodiazepines, limit lines/tubes/tethers/restraints, encourage frequent reorientation/reassurance by staff, encourage good sleep hygiene, adequate lighting  IV Thiamine and folic acid   Pending MRI brain, MRA head and neck  Cont aspirin, Plavix and statin   Replace electrolytes   Monitor platelet count   ISS   Cont home meds for BP  Tamsulosin for BPH  Aspiration, fall and seizure precaution  Lovenox for DVT ppx   Discussed with resident Patient was seen and examined this afternoon; Polish  video  Aruna ID#[id number]    75 yo M with pmhx of CVA, PAD, CAD, DM, HTN that is zane in for weakness. Patient to be admitted for acute encephalopathy due to infection vs recent stroke vs medication side effect; Patient noted to be positive for Influenza    Patient with chronic alcohol use needing some Ativan previously none in last 24 hrs again delirious this evening needing Ativan reinstated. Earlier this afternoon, pt was co-operative, seen after SWer talking with patient, admitted alcohol use few times a week with wine and vodka. reported living with wife, son his wife and grand kids total 6 people in house. reported ambulate independently    Vital Signs Last 24 Hrs  T(C): 36.7 (04 Feb 2025 15:23), Max: 36.7 (03 Feb 2025 19:27)  T(F): 98 (04 Feb 2025 15:23), Max: 98.1 (04 Feb 2025 08:17)  HR: 67 (04 Feb 2025 15:23) (66 - 75)  BP: 167/74 (04 Feb 2025 15:23) (143/82 - 169/87)  RR: 19 (04 Feb 2025 15:23) (18 - 19)  SpO2: 96% (04 Feb 2025 15:23) (94% - 96%) room air      P/E:  NAD  AAOx2, does not follow all commands for full neuro exam  CTABL  S1S2 WNL  Abd soft, non tender, BS present   BLLE no edema or calf tenderness    Labs: reviewed as below                    14.2   7.94  )-----------( 79       ( 04 Feb 2025 06:52 )             40.7     02-04  143  |  111[H]  |  14  ----------------------------<  130[H]  3.6   |  26  |  0.78  Ca    8.8      04 Feb 2025 06:52  Phos  2.5     02-04  Mg     2.0     02-04    MR Angio Head No Cont (02.03.25 @ 11:52)     IMPRESSION:  1.  BRAIN:   Left posterior hippocampal formation acute infarction. Small cerebral hemispheric white matter basal ganglia thalamic and brainstem   remote infarctions. Ischemic white matter disease and atrophy upper range typical for age  2.  ANTERIOR CIRCULATION:    Patent inflow. Patient motion limited exam  3. POSTERIOR CIRCULATION:  Patient motion limits the exam    A/P:  Acute encephalopathy, possibly multifactorial from medication side effect while drinking alcohol, Influenza, recent CVA, alcohol withdrawal   Acute CVA  Thrombocytopenia likely related to Chronic alcohol induced BM suppression as well as Viral infection with Flu  Influenza A positive  Hypokalemia due to decreased intake likely  Hypoalbuminemia/ Moderate Protein Calorie malnutrition  Hx PAD s/p LLE bypass   Hx CAD/ DM/ HTN    Plan:   Cont PRN Ativan for CIWA protocol; was discontinued this morning   Cont Tamiflu to complete 5 days  Avoid anticholinergics, benzodiazepines, limit lines/tubes/tethers/restraints, encourage frequent reorientation/reassurance by staff, encourage good sleep hygiene, adequate lighting  Continue IV Thiamine and folic acid   MRI with acute infarct; asper Neuro needs ILR; d/w EP Eleanora; likely will place loop recorder Thursday  Cont DAPT with  aspirin and Plavix and statin; Monitor LFTs in the setting of A;lchol use plus Flu which also can cause Transaminitis  Replace electrolytes   Monitor platelet count   ISS   Cont home meds for BP  Tamsulosin for BPH  Aspiration, fall and seizure precaution  Lovenox for DVT ppx   Discussed with Housestaff and RN  Discussed with patient and reviewed need

## 2025-02-04 NOTE — EEG REPORT - NS EEG TEXT BOX
KONG RUTLEDGE [id number] 74y (1950)M  Admitting MD: Dr. Angy Montiel    Study Date: 02-04-25 28 min  --------------------------------------------------------------------------------------------------  History:  CC/ HPI Patient is a 74y old  Male who presents with a chief complaint of AMS (04 Feb 2025 11:19)    artificial  tears Solution 1 Drop(s) Both EYES three times a day  aspirin  chewable 81 milliGRAM(s) Oral daily  atenolol  Tablet 50 milliGRAM(s) Oral daily  atorvastatin 80 milliGRAM(s) Oral at bedtime  clopidogrel Tablet 75 milliGRAM(s) Oral daily  enoxaparin Injectable 40 milliGRAM(s) SubCutaneous every 24 hours  ezetimibe 10 milliGRAM(s) Oral daily  folic acid 1 milliGRAM(s) Oral daily  influenza  Vaccine (HIGH DOSE) 0.5 milliLiter(s) IntraMuscular once  insulin lispro (ADMELOG) corrective regimen sliding scale   SubCutaneous three times a day before meals  insulin lispro (ADMELOG) corrective regimen sliding scale   SubCutaneous at bedtime  multivitamin 1 Tablet(s) Oral daily  oseltamivir 75 milliGRAM(s) Oral two times a day  sertraline 25 milliGRAM(s) Oral daily  tamsulosin 0.4 milliGRAM(s) Oral at bedtime  thiamine IVPB 500 milliGRAM(s) IV Intermittent every 8 hours    --------------------------------------------------------------------------------------------------  Study Interpretation:    [[[Abbreviation Key:  PDR=alpha rhythm/posterior dominant rhythm. A-P=anterior posterior gradient.  Amplitude: ‘very low’:<20; ‘low’:20-50; ‘medium’:; ‘high’:>200uV.  Persistence for periodic/rhythmic patterns (% of epoch) ‘rare’:<1%; ‘occasional’:1-10%; ‘frequent’:10-50%; ‘abundant’:50-90%; ‘continuous’:>90%.  Persistence for sporadic discharges: ‘rare’:<1/hr; ‘occasional’:1/min-1/hr; ‘frequent’:>1/min; ‘abundant’:>1/10 sec.  GRDA=generalized rhythmic delta activity; FIRDA=frontal intermittent GRDA; LRDA=lateralized rhythmic delta activity; TIRDA=temporal intermittent rhythmic delta activity;  LPD=PLED=lateralized periodic discharges; GPD=generalized periodic discharges; BiPDs=BiPLEDs=bilateral independent periodic epileptiform discharges; SIRPID=stimulus induced rhythmic, periodic, or ictal appearing discharges; BIRDs=brief potentially ictal rhythmic discharges >4 Hz, lasting .5-10s; PFA=paroxysmal bursts of beta/gamma; LVFA=low voltage fast activity.  Modifiers: +F=with fast component; +S=with spike component; +R=with rhythmic component.  S-B=burst suppression pattern.  Max=maximal. N1-drowsy; N2-stage II sleep; N3-slow wave sleep. SSS/BETS=small sharp spikes/benign epileptiform transients of sleep. HV=hyperventilation; PS=photic stimulation]]]    FINDINGS:  The background was continuous, spontaneously variable and reactive.  During wakefulness, the posteriorly dominant rhythm consisted of symmetric, well modulated 10-10.5 Hz activity, with an amplitude to 40 uV, that attenuated to eye opening.  Low amplitude central beta was noted in wakefulness.    Background Slowing:  Generalized slowing: none was present.  Focal slowing: none was present.    Sleep Background:  -Drowsiness was characterized by fragmentation, attenuation, and slowing of the background activity.    -N2 sleep transients were not recorded.    Epileptiform Activity:   No interictal epileptiform discharges were present.    Events:  No clinical events were recorded.  No seizures were recorded.    Activation Procedures:   -Hyperventilation was not performed.    -Photic stimulation was performed and did not elicit any abnormalities.      Artifacts:  Intermittent myogenic and external motion artifacts were noted.    ECG:  The heart rate on single channel ECG at baseline could not be discerned.   -----------------------------------------------------------------------------------------------------    EEG Classification / Summary:  normal EEG study, awake / drowsy   -----------------------------------------------------------------------------------------------------    Clinical Impression:  This is a normal EEG.     There were no epileptiform abnormalities recorded.        THIS IS A PRELIMINARY INTERPRETATION ONLY PENDING ATTENDING REVIEW/ATTESTATION    Stacy Mayo DO  Epilepsy Fellow, PGY-5    -------------------------------------------------------------------------------------------------------  [facility] EEG Reading Room Ph#: [phone]  Epilepsy Answering Service after 5PM and before 8:30AM: Ph#: [phone]     KONG RUTLEDGE [id number] 74y (1950)M  Admitting MD: Dr. Angy Montiel    Study Date: 02-04-25 28 min  --------------------------------------------------------------------------------------------------  History:  CC/ HPI Patient is a 74y old  Male who presents with a chief complaint of AMS (04 Feb 2025 11:19)    artificial  tears Solution 1 Drop(s) Both EYES three times a day  aspirin  chewable 81 milliGRAM(s) Oral daily  atenolol  Tablet 50 milliGRAM(s) Oral daily  atorvastatin 80 milliGRAM(s) Oral at bedtime  clopidogrel Tablet 75 milliGRAM(s) Oral daily  enoxaparin Injectable 40 milliGRAM(s) SubCutaneous every 24 hours  ezetimibe 10 milliGRAM(s) Oral daily  folic acid 1 milliGRAM(s) Oral daily  influenza  Vaccine (HIGH DOSE) 0.5 milliLiter(s) IntraMuscular once  insulin lispro (ADMELOG) corrective regimen sliding scale   SubCutaneous three times a day before meals  insulin lispro (ADMELOG) corrective regimen sliding scale   SubCutaneous at bedtime  multivitamin 1 Tablet(s) Oral daily  oseltamivir 75 milliGRAM(s) Oral two times a day  sertraline 25 milliGRAM(s) Oral daily  tamsulosin 0.4 milliGRAM(s) Oral at bedtime  thiamine IVPB 500 milliGRAM(s) IV Intermittent every 8 hours    --------------------------------------------------------------------------------------------------  Study Interpretation:    [[[Abbreviation Key:  PDR=alpha rhythm/posterior dominant rhythm. A-P=anterior posterior gradient.  Amplitude: ‘very low’:<20; ‘low’:20-50; ‘medium’:; ‘high’:>200uV.  Persistence for periodic/rhythmic patterns (% of epoch) ‘rare’:<1%; ‘occasional’:1-10%; ‘frequent’:10-50%; ‘abundant’:50-90%; ‘continuous’:>90%.  Persistence for sporadic discharges: ‘rare’:<1/hr; ‘occasional’:1/min-1/hr; ‘frequent’:>1/min; ‘abundant’:>1/10 sec.  GRDA=generalized rhythmic delta activity; FIRDA=frontal intermittent GRDA; LRDA=lateralized rhythmic delta activity; TIRDA=temporal intermittent rhythmic delta activity;  LPD=PLED=lateralized periodic discharges; GPD=generalized periodic discharges; BiPDs=BiPLEDs=bilateral independent periodic epileptiform discharges; SIRPID=stimulus induced rhythmic, periodic, or ictal appearing discharges; BIRDs=brief potentially ictal rhythmic discharges >4 Hz, lasting .5-10s; PFA=paroxysmal bursts of beta/gamma; LVFA=low voltage fast activity.  Modifiers: +F=with fast component; +S=with spike component; +R=with rhythmic component.  S-B=burst suppression pattern.  Max=maximal. N1-drowsy; N2-stage II sleep; N3-slow wave sleep. SSS/BETS=small sharp spikes/benign epileptiform transients of sleep. HV=hyperventilation; PS=photic stimulation]]]    FINDINGS:  The background was continuous, spontaneously variable and reactive.  During wakefulness, the posteriorly dominant rhythm consisted of symmetric, well modulated 10-10.5 Hz activity, with an amplitude to 40 uV, that attenuated to eye opening.  Low amplitude central beta was noted in wakefulness.    Background Slowing:  Generalized slowing: none was present.  Focal slowing: none was present.    Sleep Background:  -Drowsiness was characterized by fragmentation, attenuation, and slowing of the background activity.    -N2 sleep transients were not recorded.    Epileptiform Activity:   No interictal epileptiform discharges were present.    Events:  No clinical events were recorded.  No seizures were recorded.    Activation Procedures:   -Hyperventilation was not performed.    -Photic stimulation was performed and did not elicit any abnormalities.      Artifacts:  Intermittent myogenic and external motion artifacts were noted.    ECG:  The heart rate on single channel ECG at baseline could not be discerned.   -----------------------------------------------------------------------------------------------------    EEG Classification / Summary:  normal EEG study, awake / drowsy   -----------------------------------------------------------------------------------------------------    Clinical Impression:  This is a normal EEG.     There were no epileptiform abnormalities recorded.            Stacy Mayo DO  Epilepsy Fellow, PGY-5    -------------------------------------------------------------------------------------------------------  [facility] EEG Reading Room Ph#: [phone]  Epilepsy Answering Service after 5PM and before 8:30AM: Ph#: [phone]

## 2025-02-04 NOTE — PROGRESS NOTE ADULT - ASSESSMENT
73 yo M with pmhx of CVA, PAD, CAD, DM, HTN that is zane in for weakness. Patient to be admitted for acute encephalopathy due to infection vs recent stroke vs medication side effect

## 2025-02-04 NOTE — CHART NOTE - NSCHARTNOTEFT_GEN_A_CORE
Attempted to contact pt's spouse Lisa ([phone]) to obtain collateral information about pt's baseline mental status and the recent events surrounding his [facility] stay for ?stroke. No answer. Will call back at another time.

## 2025-02-04 NOTE — PROGRESS NOTE ADULT - PROBLEM SELECTOR PLAN 1
patient brought due to confusion, weakness by family members after taking benadryl for feeling unwell generally baseline according to family AAOx2 but forgetful, states that 4 days ago had a stroke treated at [facility]   ED met sirs criteria   patient with multiple possible etiologies for encephalopathy (due to infection vs recent stroke vs medication side effect)  -c/w tamiflu 2/6/25 last dose  -monitor mental status    Neuro - Dr. Callahan

## 2025-02-04 NOTE — PROGRESS NOTE ADULT - SUBJECTIVE AND OBJECTIVE BOX
PGY-1 Progress Note discussed with attending    PAGER #: [ Teams ] TILL 5:00 PM  PLEASE CONTACT ON CALL TEAM:  - On Call Team (Please refer to Abhishek) FROM 5:00 PM - 8:30PM  - Nightfloat Team FROM 8:30 -7:30 AM    CHIEF COMPLAINT & BRIEF HOSPITAL COURSE: Patient is a 74y old  Male who presents with a chief complaint of AMS (04 Feb 2025 11:19)      INTERVAL HPI/OVERNIGHT EVENTS: Patient seen at bedside this AM, AAOx2, no acute complaints seen using Polish  Connie #[id number]. Patient states that his stroke was 4 years ago and that his only symptom was that he "wasn't looking, was in a dark corner?, partially blinded".       REVIEW OF SYSTEMS:  CONSTITUTIONAL: No fever  RESPIRATORY: No cough, wheezing, chills or hemoptysis; No shortness of breath  CARDIOVASCULAR: No chest pain, palpitations, dizziness, or leg swelling  GASTROINTESTINAL: No abdominal pain. No nausea, vomiting, or hematemesis; No diarrhea or constipation. No melena or hematochezia.  GENITOURINARY: No dysuria or hematuria, urinary frequency  NEUROLOGICAL: No headaches  SKIN: No itching, burning, rashes, or lesions     Vital Signs Last 24 Hrs  T(C): 36.6 (04 Feb 2025 11:40), Max: 36.7 (03 Feb 2025 19:27)  T(F): 97.9 (04 Feb 2025 11:40), Max: 98.1 (04 Feb 2025 08:17)  HR: 67 (04 Feb 2025 11:40) (64 - 75)  BP: 145/77 (04 Feb 2025 11:40) (145/77 - 169/87)  BP(mean): --  RR: 18 (04 Feb 2025 11:40) (18 - 18)  SpO2: 96% (04 Feb 2025 11:40) (95% - 97%)    Parameters below as of 04 Feb 2025 11:40  Patient On (Oxygen Delivery Method): room air        PHYSICAL EXAMINATION:  GENERAL: NAD, well built  HEAD:  Atraumatic, Normocephalic  EYES:  conjunctiva and sclera clear  NECK: Supple, No JVD  CHEST/LUNG: Clear to auscultation. No rales, rhonchi, wheezing, or rubs  HEART: Regular rate and rhythm; No murmurs, rubs, or gallops  ABDOMEN: Soft, Nontender, Nondistended; Bowel sounds present  NERVOUS SYSTEM:  Alert & Oriented X2,    EXTREMITIES:  2+ Peripheral Pulses, No clubbing, cyanosis, or edema  SKIN: warm dry                          14.2   7.94  )-----------( 79       ( 04 Feb 2025 06:52 )             40.7     02-04    143  |  111[H]  |  14  ----------------------------<  130[H]  3.6   |  26  |  0.78    Ca    8.8      04 Feb 2025 06:52  Phos  2.5     02-04  Mg     2.0     02-04            PT/INR - ( 03 Feb 2025 06:59 )   PT: 12.8 sec;   INR: 1.10 ratio         PTT - ( 03 Feb 2025 06:59 )  PTT:29.7 sec    CAPILLARY BLOOD GLUCOSE      RADIOLOGY & ADDITIONAL TESTS:

## 2025-02-04 NOTE — CHART NOTE - NSCHARTNOTEFT_GEN_A_CORE
Attempted to reach family multiple times at the following listed contact numbers however, unable to contact any family member to provider update or gain more collateral regarding patient's history of recent stroke.     Son: Diego [phone]  Wife: Lisa [phone]  Home: [phone]

## 2025-02-05 LAB
ALBUMIN SERPL ELPH-MCNC: 2.5 G/DL — LOW (ref 3.5–5)
ALP SERPL-CCNC: 68 U/L — SIGNIFICANT CHANGE UP (ref 40–120)
ALT FLD-CCNC: 18 U/L DA — SIGNIFICANT CHANGE UP (ref 10–60)
ANION GAP SERPL CALC-SCNC: 7 MMOL/L — SIGNIFICANT CHANGE UP (ref 5–17)
AST SERPL-CCNC: 19 U/L — SIGNIFICANT CHANGE UP (ref 10–40)
BILIRUB SERPL-MCNC: 1.1 MG/DL — SIGNIFICANT CHANGE UP (ref 0.2–1.2)
BUN SERPL-MCNC: 13 MG/DL — SIGNIFICANT CHANGE UP (ref 7–18)
CALCIUM SERPL-MCNC: 8.6 MG/DL — SIGNIFICANT CHANGE UP (ref 8.4–10.5)
CHLORIDE SERPL-SCNC: 109 MMOL/L — HIGH (ref 96–108)
CO2 SERPL-SCNC: 26 MMOL/L — SIGNIFICANT CHANGE UP (ref 22–31)
CREAT SERPL-MCNC: 0.64 MG/DL — SIGNIFICANT CHANGE UP (ref 0.5–1.3)
EGFR: 101 ML/MIN/1.73M2 — SIGNIFICANT CHANGE UP
GLUCOSE BLDC GLUCOMTR-MCNC: 141 MG/DL — HIGH (ref 70–99)
GLUCOSE BLDC GLUCOMTR-MCNC: 142 MG/DL — HIGH (ref 70–99)
GLUCOSE BLDC GLUCOMTR-MCNC: 151 MG/DL — HIGH (ref 70–99)
GLUCOSE BLDC GLUCOMTR-MCNC: 203 MG/DL — HIGH (ref 70–99)
GLUCOSE SERPL-MCNC: 186 MG/DL — HIGH (ref 70–99)
HCT VFR BLD CALC: 39.4 % — SIGNIFICANT CHANGE UP (ref 39–50)
HGB BLD-MCNC: 14 G/DL — SIGNIFICANT CHANGE UP (ref 13–17)
MAGNESIUM SERPL-MCNC: 2 MG/DL — SIGNIFICANT CHANGE UP (ref 1.6–2.6)
MCHC RBC-ENTMCNC: 34.5 PG — HIGH (ref 27–34)
MCHC RBC-ENTMCNC: 35.5 G/DL — SIGNIFICANT CHANGE UP (ref 32–36)
MCV RBC AUTO: 97 FL — SIGNIFICANT CHANGE UP (ref 80–100)
NRBC # BLD: 0 /100 WBCS — SIGNIFICANT CHANGE UP (ref 0–0)
NRBC BLD-RTO: 0 /100 WBCS — SIGNIFICANT CHANGE UP (ref 0–0)
PHOSPHATE SERPL-MCNC: 2.8 MG/DL — SIGNIFICANT CHANGE UP (ref 2.5–4.5)
PLATELET # BLD AUTO: 85 K/UL — LOW (ref 150–400)
POTASSIUM SERPL-MCNC: 2.9 MMOL/L — CRITICAL LOW (ref 3.5–5.3)
POTASSIUM SERPL-SCNC: 2.9 MMOL/L — CRITICAL LOW (ref 3.5–5.3)
PROT SERPL-MCNC: 6 G/DL — SIGNIFICANT CHANGE UP (ref 6–8.3)
RBC # BLD: 4.06 M/UL — LOW (ref 4.2–5.8)
RBC # FLD: 13 % — SIGNIFICANT CHANGE UP (ref 10.3–14.5)
SODIUM SERPL-SCNC: 142 MMOL/L — SIGNIFICANT CHANGE UP (ref 135–145)
WBC # BLD: 10.04 K/UL — SIGNIFICANT CHANGE UP (ref 3.8–10.5)
WBC # FLD AUTO: 10.04 K/UL — SIGNIFICANT CHANGE UP (ref 3.8–10.5)

## 2025-02-05 PROCEDURE — 99232 SBSQ HOSP IP/OBS MODERATE 35: CPT

## 2025-02-05 PROCEDURE — 99221 1ST HOSP IP/OBS SF/LOW 40: CPT

## 2025-02-05 PROCEDURE — 99233 SBSQ HOSP IP/OBS HIGH 50: CPT | Mod: GC

## 2025-02-05 RX ORDER — POTASSIUM CHLORIDE 750 MG/1
40 TABLET, EXTENDED RELEASE ORAL EVERY 4 HOURS
Refills: 0 | Status: COMPLETED | OUTPATIENT
Start: 2025-02-05 | End: 2025-02-05

## 2025-02-05 RX ORDER — LIDOCAINE HYDROCHLORIDE 10 MG/ML
10 INJECTION EPIDURAL; INFILTRATION; INTRACAUDAL ONCE
Refills: 0 | Status: DISCONTINUED | OUTPATIENT
Start: 2025-02-06 | End: 2025-02-07

## 2025-02-05 RX ORDER — CARVEDILOL 6.25 MG
3.12 TABLET ORAL EVERY 12 HOURS
Refills: 0 | Status: DISCONTINUED | OUTPATIENT
Start: 2025-02-05 | End: 2025-02-06

## 2025-02-05 RX ADMIN — POTASSIUM CHLORIDE 40 MILLIEQUIVALENT(S): 750 TABLET, EXTENDED RELEASE ORAL at 14:22

## 2025-02-05 RX ADMIN — Medication 75 MILLIGRAM(S): at 11:42

## 2025-02-05 RX ADMIN — Medication 1 TABLET(S): at 11:41

## 2025-02-05 RX ADMIN — Medication 20 MILLIGRAM(S): at 05:50

## 2025-02-05 RX ADMIN — ASPIRIN 81 MILLIGRAM(S): 81 TABLET, COATED ORAL at 11:42

## 2025-02-05 RX ADMIN — Medication 1 MILLIGRAM(S): at 11:41

## 2025-02-05 RX ADMIN — Medication 1 DROP(S): at 14:22

## 2025-02-05 RX ADMIN — Medication 2: at 08:23

## 2025-02-05 RX ADMIN — Medication 25 MILLIGRAM(S): at 11:42

## 2025-02-05 RX ADMIN — Medication 10 MILLIGRAM(S): at 11:41

## 2025-02-05 RX ADMIN — Medication 1: at 17:53

## 2025-02-05 RX ADMIN — Medication 105 MILLIGRAM(S): at 23:15

## 2025-02-05 RX ADMIN — Medication 105 MILLIGRAM(S): at 14:22

## 2025-02-05 RX ADMIN — Medication 105 MILLIGRAM(S): at 05:50

## 2025-02-05 RX ADMIN — OSELTAMIVIR PHOSPHATE 75 MILLIGRAM(S): 75 CAPSULE ORAL at 17:21

## 2025-02-05 RX ADMIN — OSELTAMIVIR PHOSPHATE 75 MILLIGRAM(S): 75 CAPSULE ORAL at 05:50

## 2025-02-05 RX ADMIN — Medication 3.12 MILLIGRAM(S): at 17:21

## 2025-02-05 RX ADMIN — TAMSULOSIN HYDROCHLORIDE 0.4 MILLIGRAM(S): 0.4 CAPSULE ORAL at 23:15

## 2025-02-05 RX ADMIN — ENOXAPARIN SODIUM 40 MILLIGRAM(S): 100 INJECTION SUBCUTANEOUS at 05:50

## 2025-02-05 RX ADMIN — POTASSIUM CHLORIDE 40 MILLIEQUIVALENT(S): 750 TABLET, EXTENDED RELEASE ORAL at 09:56

## 2025-02-05 RX ADMIN — Medication 1 DROP(S): at 05:50

## 2025-02-05 RX ADMIN — ATORVASTATIN CALCIUM 80 MILLIGRAM(S): 80 TABLET, FILM COATED ORAL at 23:15

## 2025-02-05 RX ADMIN — Medication 50 MILLIGRAM(S): at 05:50

## 2025-02-05 NOTE — PROGRESS NOTE ADULT - PROBLEM SELECTOR PLAN 1
patient brought due to confusion, weakness by family members after self-medicating with Nyquil for feeling unwell generally baseline according to family AAOx2 but forgetful, last stroke 1-2 years ago after obtaining more collateral from son   ED met sirs criteria   -c/w tamiflu 2/6/25 last dose  -monitor mental status    Neuro - Dr. Callahan

## 2025-02-05 NOTE — PROGRESS NOTE ADULT - SUBJECTIVE AND OBJECTIVE BOX
PGY-1 Progress Note discussed with attending    PAGER #: [ Teams ] TILL 5:00 PM  PLEASE CONTACT ON CALL TEAM:  - On Call Team (Please refer to Abhishek) FROM 5:00 PM - 8:30PM  - Nightfloat Team FROM 8:30 -7:30 AM    CHIEF COMPLAINT & BRIEF HOSPITAL COURSE: Patient is a 74y old  Male who presents with a chief complaint of AMS (05 Feb 2025 14:28)      INTERVAL HPI/OVERNIGHT EVENTS: Patient seen at bedside, AAOx2, ambulating no acute complaints. Family at bedside endorses last stroke was approx. 1-2 years ago, patient was altered and had issues with motor skills and was brought to ED. Informed of new stroke diagnosis and consented for ILR and JUANA.      REVIEW OF SYSTEMS:  CONSTITUTIONAL: No fever  RESPIRATORY: No cough, wheezing, chills or hemoptysis; No shortness of breath  CARDIOVASCULAR: No chest pain, palpitations, dizziness, or leg swelling  GASTROINTESTINAL: No abdominal pain. No nausea, vomiting, or hematemesis; No diarrhea or constipation. No melena or hematochezia.  GENITOURINARY: No dysuria or hematuria, urinary frequency  NEUROLOGICAL: No headaches  SKIN: No itching, burning, rashes, or lesions     Vital Signs Last 24 Hrs  T(C): 36.7 (05 Feb 2025 16:12), Max: 37.3 (05 Feb 2025 07:42)  T(F): 98.1 (05 Feb 2025 16:12), Max: 99.1 (05 Feb 2025 07:42)  HR: 63 (05 Feb 2025 16:12) (61 - 92)  BP: 130/56 (05 Feb 2025 16:12) (130/56 - 194/108)  BP(mean): 82 (05 Feb 2025 07:42) (82 - 82)  RR: 18 (05 Feb 2025 16:12) (18 - 19)  SpO2: 96% (05 Feb 2025 16:12) (95% - 98%)    Parameters below as of 05 Feb 2025 16:12  Patient On (Oxygen Delivery Method): room air        PHYSICAL EXAMINATION:  GENERAL: NAD, well built  HEAD:  Atraumatic, Normocephalic  EYES:  conjunctiva and sclera clear  NECK: Supple, No JVD  CHEST/LUNG: Clear to auscultation. No rales, rhonchi, wheezing, or rubs  HEART: Regular rate and rhythm; No murmurs, rubs, or gallops  ABDOMEN: Soft, Nontender, Nondistended; Bowel sounds present  NERVOUS SYSTEM:  Alert & Oriented X2,    EXTREMITIES: No clubbing, cyanosis, or edema  SKIN: warm dry                          14.0   10.04 )-----------( 85       ( 05 Feb 2025 07:47 )             39.4     02-05    142  |  109[H]  |  13  ----------------------------<  186[H]  2.9[LL]   |  26  |  0.64    Ca    8.6      05 Feb 2025 07:47  Phos  2.8     02-05  Mg     2.0     02-05    TPro  6.0  /  Alb  2.5[L]  /  TBili  1.1  /  DBili  x   /  AST  19  /  ALT  18  /  AlkPhos  68  02-05    LIVER FUNCTIONS - ( 05 Feb 2025 07:47 )  Alb: 2.5 g/dL / Pro: 6.0 g/dL / ALK PHOS: 68 U/L / ALT: 18 U/L DA / AST: 19 U/L / GGT: x                   CAPILLARY BLOOD GLUCOSE      RADIOLOGY & ADDITIONAL TESTS:

## 2025-02-05 NOTE — CONSULT NOTE ADULT - PROBLEM SELECTOR RECOMMENDATION 9
-There is no telemetry evidence of atrial arrhythmias; nor is there a clear pacing indication at this time.  Per CRYSTAL-AF, patient will benefit from prolonged monitoring for detection of AF/PAF as cause of potential cardioembolic source.  ILR implantation for prolonged monitoring for detection of suspected, asymptomatic AF/PAF advised. Risks, benefits, and alternatives discussed with patient's wife and son at bedside.  -family agreeable  -consents obtained  -patient for ILR placement in am

## 2025-02-05 NOTE — CONSULT NOTE ADULT - ASSESSMENT
Impression:  Altered mental status in patient with h/o CVA in setting of Influenza infection and nightquil concerning for new CVA, seizure, toxic metabolic encephalopathy due to Influenza or medication side effect due to nightquil, symptoms now resolved.     Recommendations:  1.             Admit to telemetry for 24hours to evaluate for arrythmias/ Afib.  2.             MRI brain, MRA head without contrast, Carotid duplex (CD).  If unable to get MR imaging, please consider CTA head and neck in 24hours (no need for CD in this case).  If the patient is unable to get MR and unable to get IV contrast please repeat the CTH in 24hours and get a CD.  Role of the imaging is to evaluate for stroke and evaluate vasculature for artherosclerosis/ thrombi which may have lead to a stroke.  3.             TTE  4.             Please check HbA1C and fasting lipid profile  5.             Secondary stroke prevention: ASA 81mg (or ASA 325mg rectally if unable to take p) and Lipitor 80mg HS due to intercranial stenosis; Plavix x 3 weeks.  If the patient is on anticoagulation, there is no neurological need for ASA or Plavix.  If there is no acute CVA, then there is no neurological need for plavix.  6.             BP goal of high normal  7. EEG, sz and fall ppx, no AED for now  8. treat for influenza as per medical team  10. avoid sedating medications  11.          PT evaluation  12.          STAT CTH IF the patient has sudden change in mental status or neurological exam  13.          DVT PPx    Thank you for the courtesy of this consult.    spent 80min
73 yo M with pmhx of CVA, PAD, CAD, DM, HTN that is zane in for weakness neuro called for latered mental status. Patient at bedside a very poor historian, stating that today started feeling unwell generally for which took a medication (patient could not recall ) and per ED note took nyquil and after taking the medication patient could not recall what happened after. Per ED note after taking the medication patient started acting confused and genralized weak. Patient states that 4 days ago was with his doctor that was checking his eye and was told to go the ER. Patient went to [facility] and was treated for a stroke with medications and after was discharged home.
74M w/ recent stroke found to have R ICA stenosis   Carotid duplex findings appear independent of MRI findings    Plan:  -No acute vascular surgery intervention at this time (especially in acute stroke setting)  -Continue ASA/Statin/Plavix  -Likely outpatient follow up  -Follow up neuro recs  -Remainder of care per medical team  -Discussed plan with Dr. Kirkland
75 yo M with pmhx of CVA, PAD, CAD, DM, HTN that is zane in for weakness. Patient to be admitted for acute encephalopathy due to infection vs recent stroke vs medication side effect. Brain MRI revealed   Left posterior hippocampal formation acute infarction. Small cerebral hemispheric white matter basal ganglia thalamic and brainstem   remote infarctions. Electrophysiology was consulted for ILR placement

## 2025-02-05 NOTE — CONSULT NOTE ADULT - SUBJECTIVE AND OBJECTIVE BOX
CHIEF COMPLAINT:    HPI:    PAST MEDICAL & SURGICAL HISTORY:  Coronary artery disease  s/p CABG      Cerebrovascular accident (CVA) with involvement of right side of body      BPH (benign prostatic hyperplasia)      Stented coronary artery      PVD (peripheral vascular disease)      HLD (hyperlipidemia)      HTN (hypertension)      Vertebral osteomyelitis      Thrombocytopenia      History of coronary artery bypass surgery      S/P femoral-tibial bypass      History of fusion of cervical spine          Allergies    No Known Allergies    Intolerances        MEDICATIONS  (STANDING):  artificial  tears Solution 1 Drop(s) Both EYES three times a day  aspirin  chewable 81 milliGRAM(s) Oral daily  atorvastatin 80 milliGRAM(s) Oral at bedtime  carvedilol 3.125 milliGRAM(s) Oral every 12 hours  clopidogrel Tablet 75 milliGRAM(s) Oral daily  enalapril 20 milliGRAM(s) Oral daily  enoxaparin Injectable 40 milliGRAM(s) SubCutaneous every 24 hours  ezetimibe 10 milliGRAM(s) Oral daily  folic acid 1 milliGRAM(s) Oral daily  influenza  Vaccine (HIGH DOSE) 0.5 milliLiter(s) IntraMuscular once  insulin lispro (ADMELOG) corrective regimen sliding scale   SubCutaneous three times a day before meals  insulin lispro (ADMELOG) corrective regimen sliding scale   SubCutaneous at bedtime  multivitamin 1 Tablet(s) Oral daily  oseltamivir 75 milliGRAM(s) Oral two times a day  potassium chloride   Powder 40 milliEquivalent(s) Oral every 4 hours  sertraline 25 milliGRAM(s) Oral daily  tamsulosin 0.4 milliGRAM(s) Oral at bedtime  thiamine IVPB 500 milliGRAM(s) IV Intermittent every 8 hours    MEDICATIONS  (PRN):  acetaminophen     Tablet .. 650 milliGRAM(s) Oral every 6 hours PRN Temp greater or equal to 38C (100.4F), Mild Pain (1 - 3)  LORazepam   Injectable 1 milliGRAM(s) IV Push every 2 hours PRN CIWA-Ar score increase by 2 points and a total score of 7 or less  LORazepam   Injectable 1 milliGRAM(s) IV Push every 1 hour PRN CIWA-Ar score 8 or greater  ondansetron Injectable 4 milliGRAM(s) IV Push every 8 hours PRN Nausea and/or Vomiting      FAMILY HISTORY:  Family history of heart disease (Mother)    FH: hypertension (Mother)    FHx: prostate cancer (Father)        ***No family history of premature coronary artery disease or sudden cardiac death    SOCIAL HISTORY:  Smoking-  Alcohol-  Illicit Drug use-    REVIEW OF SYSTEMS:  Constitutional: [ ] fever, [ ]weight loss,  [ ]fatigue  Eyes: [ ] visual changes  Respiratory: [ ]shortness of breath;  [ ] cough, [ ]wheezing, [ ]chills, [ ]hemoptysis  Cardiovascular: [ ] chest pain, [ ]palpitations, [ ]dizziness,  [ ]leg swelling [ ]syncope  Gastrointestinal: [ ] abdominal pain, [ ]nausea, [ ]vomiting,  [ ]diarrhea   Genitourinary: [ ] dysuria, [ ] hematuria  Neurologic: [ ] headaches [ ] tremors  [ ] weakness [ ] lightheadedness  Skin: [ ] itching, [ ]burning, [ ] rashes  Endocrine: [ ] heat or cold intolerance  Musculoskeletal: [ ] joint pain or swelling; [ ] muscle, back, or extremity pain  Psychiatric: [ ] depression, [ ]anxiety, [ ]mood swings, or [ ]difficulty sleeping  Hematologic: [ ] easy bruising, [ ] bleeding gums     [ x] All others negative	  [ ] Unable to obtain    Vital Signs Last 24 Hrs  T(C): 36.2 (05 Feb 2025 11:46), Max: 37.3 (05 Feb 2025 07:42)  T(F): 97.2 (05 Feb 2025 11:46), Max: 99.1 (05 Feb 2025 07:42)  HR: 61 (05 Feb 2025 11:46) (61 - 92)  BP: 149/78 (05 Feb 2025 11:46) (139/56 - 194/108)  BP(mean): 82 (05 Feb 2025 07:42) (82 - 82)  RR: 18 (05 Feb 2025 11:46) (18 - 19)  SpO2: 96% (05 Feb 2025 11:46) (94% - 98%)    Parameters below as of 05 Feb 2025 11:46  Patient On (Oxygen Delivery Method): room air      I&O's Summary      PHYSICAL EXAM:  General: No acute distress  HEENT: EOMI  Neck:  No JVD  Lungs: Clear to auscultation bilaterally; No rales or wheezing  Heart: Regular rate and rhythm; No murmurs, rubs, or gallops  Abdomen: soft, non tender, non distended   Extremities: warm, no edema   Nervous system:  Alert & Oriented X3  Psychiatric: Normal affect  Skin: No rashes or lesions    LABS:  02-05    142  |  109[H]  |  13  ----------------------------<  186[H]  2.9[LL]   |  26  |  0.64    Ca    8.6      05 Feb 2025 07:47  Phos  2.8     02-05  Mg     2.0     02-05    TPro  6.0  /  Alb  2.5[L]  /  TBili  1.1  /  DBili  x   /  AST  19  /  ALT  18  /  AlkPhos  68  02-05    Creatinine Trend: 0.64<--, 0.78<--, 0.76<--, 0.75<--, 0.87<--, 1.11<--                        14.0   10.04 )-----------( 85       ( 05 Feb 2025 07:47 )             39.4         Lipid Panel:   Cardiac Enzymes:           RADIOLOGY:    ECG [my interpretation]:    TELEMETRY:    ECHO:    STRESS TEST:    CATHETERIZATION: CHIEF COMPLAINT: altered mental status    Patient confused.  HPI taken from chart review    HPI:· 74-year-old male with past medical history of CVA (2 years ago, residual right-sided deficits per family) on aspirin and Plavix, peripheral arterial disease, CAD status post CABG, diabetes, hypertension presents with generalized weakness today.  Patient is confused. As per chart, family states patient with flulike symptoms, took NyQuil.  Family states patient became confused, unable to stand, generally weak.  Patient denies chest pain, palpitations, syncope, shortness of breath, LE edema, PND/orthopnea.     PAST MEDICAL & SURGICAL HISTORY:  Coronary artery disease  s/p CABG      Cerebrovascular accident (CVA) with involvement of right side of body      BPH (benign prostatic hyperplasia)      Stented coronary artery      PVD (peripheral vascular disease)      HLD (hyperlipidemia)      HTN (hypertension)      Vertebral osteomyelitis      Thrombocytopenia      History of coronary artery bypass surgery      S/P femoral-tibial bypass      History of fusion of cervical spine          Allergies    No Known Allergies    Intolerances        MEDICATIONS  (STANDING):  artificial  tears Solution 1 Drop(s) Both EYES three times a day  aspirin  chewable 81 milliGRAM(s) Oral daily  atorvastatin 80 milliGRAM(s) Oral at bedtime  carvedilol 3.125 milliGRAM(s) Oral every 12 hours  clopidogrel Tablet 75 milliGRAM(s) Oral daily  enalapril 20 milliGRAM(s) Oral daily  enoxaparin Injectable 40 milliGRAM(s) SubCutaneous every 24 hours  ezetimibe 10 milliGRAM(s) Oral daily  folic acid 1 milliGRAM(s) Oral daily  influenza  Vaccine (HIGH DOSE) 0.5 milliLiter(s) IntraMuscular once  insulin lispro (ADMELOG) corrective regimen sliding scale   SubCutaneous three times a day before meals  insulin lispro (ADMELOG) corrective regimen sliding scale   SubCutaneous at bedtime  multivitamin 1 Tablet(s) Oral daily  oseltamivir 75 milliGRAM(s) Oral two times a day  potassium chloride   Powder 40 milliEquivalent(s) Oral every 4 hours  sertraline 25 milliGRAM(s) Oral daily  tamsulosin 0.4 milliGRAM(s) Oral at bedtime  thiamine IVPB 500 milliGRAM(s) IV Intermittent every 8 hours    MEDICATIONS  (PRN):  acetaminophen     Tablet .. 650 milliGRAM(s) Oral every 6 hours PRN Temp greater or equal to 38C (100.4F), Mild Pain (1 - 3)  LORazepam   Injectable 1 milliGRAM(s) IV Push every 2 hours PRN CIWA-Ar score increase by 2 points and a total score of 7 or less  LORazepam   Injectable 1 milliGRAM(s) IV Push every 1 hour PRN CIWA-Ar score 8 or greater  ondansetron Injectable 4 milliGRAM(s) IV Push every 8 hours PRN Nausea and/or Vomiting      FAMILY HISTORY:   Family history of heart disease (Mother)    FH: hypertension (Mother)    FHx: prostate cancer (Father)        ***No family history of premature coronary artery disease or sudden cardiac death    SOCIAL HISTORY: Unable to obtain  Smoking-  Alcohol-  Illicit Drug use-    REVIEW OF SYSTEMS:  Constitutional: [ ] fever, [ ]weight loss,  [ ]fatigue  Eyes: [ ] visual changes  Respiratory: [ ]shortness of breath;  [ ] cough, [ ]wheezing, [ ]chills, [ ]hemoptysis  Cardiovascular: [ ] chest pain, [ ]palpitations, [ ]dizziness,  [ ]leg swelling [ ]syncope  Gastrointestinal: [ ] abdominal pain, [ ]nausea, [ ]vomiting,  [ ]diarrhea   Genitourinary: [ ] dysuria, [ ] hematuria  Neurologic: [ ] headaches [ ] tremors  [ ] weakness [ ] lightheadedness  Skin: [ ] itching, [ ]burning, [ ] rashes  Endocrine: [ ] heat or cold intolerance  Musculoskeletal: [ ] joint pain or swelling; [ ] muscle, back, or extremity pain  Psychiatric: [ ] depression, [ ]anxiety, [ ]mood swings, or [ ]difficulty sleeping  Hematologic: [ ] easy bruising, [ ] bleeding gums     [ ] All others negative	  [X ] Unable to obtain    Vital Signs Last 24 Hrs  T(C): 36.2 (05 Feb 2025 11:46), Max: 37.3 (05 Feb 2025 07:42)  T(F): 97.2 (05 Feb 2025 11:46), Max: 99.1 (05 Feb 2025 07:42)  HR: 61 (05 Feb 2025 11:46) (61 - 92)  BP: 149/78 (05 Feb 2025 11:46) (139/56 - 194/108)  BP(mean): 82 (05 Feb 2025 07:42) (82 - 82)  RR: 18 (05 Feb 2025 11:46) (18 - 19)  SpO2: 96% (05 Feb 2025 11:46) (94% - 98%)    Parameters below as of 05 Feb 2025 11:46  Patient On (Oxygen Delivery Method): room air      I&O's Summary      PHYSICAL EXAM:  General: No acute distress  HEENT: EOMI  Neck:  No JVD  Lungs: Clear to auscultation bilaterally; No rales or wheezing  Heart: Regular rate and rhythm; No murmurs, rubs, or gallops  Abdomen: soft, non tender, non distended   Extremities: warm, no edema   Nervous system:  Alert & Oriented x1,2  Psychiatric: Normal affect  Skin: No rashes or lesions    LABS:  02-05    142  |  109[H]  |  13  ----------------------------<  186[H]  2.9[LL]   |  26  |  0.64    Ca    8.6      05 Feb 2025 07:47  Phos  2.8     02-05  Mg     2.0     02-05    TPro  6.0  /  Alb  2.5[L]  /  TBili  1.1  /  DBili  x   /  AST  19  /  ALT  18  /  AlkPhos  68  02-05    Creatinine Trend: 0.64<--, 0.78<--, 0.76<--, 0.75<--, 0.87<--, 1.11<--                        14.0   10.04 )-----------( 85       ( 05 Feb 2025 07:47 )             39.4         Lipid Panel:   Cardiac Enzymes:           RADIOLOGY: < from: US Abdomen Complete (US Abdomen Complete .) (02.04.25 @ 11:41) >  IMPRESSION:  Apparent nodularity of the liver contour but limited evaluation by   sonography. Recommend CT for definitive assessment.    Cholelithiasis.    --- End of Report ---    < end of copied text >  < from: MR Angio Head No Cont (02.03.25 @ 11:52) >  IMPRESSION:    1.  BRAIN:   Left posterior hippocampal formation acute infarction. Small   cerebral hemispheric white matter basal ganglia thalamic and brainstem   remote infarctions. Ischemic white matter disease and atrophy upper range   typical for age    2.  ANTERIOR CIRCULATION:    Patent inflow. Patient motion limited exam    3. POSTERIOR CIRCULATION:  Patient motion limits the exam    --- End of Report ---    < end of copied text >  < from: MR Angio Head No Cont (02.03.25 @ 11:52) >        ECG [my interpretation]:    TELEMETRY: NSR HR 50-70    ECHO:< from: TTE W or WO Ultrasound Enhancing Agent (02.04.25 @ 06:53) >  CONCLUSIONS:      1. Left ventricular cavity is normal in size. Left ventricular systolic function is normal with an ejection fraction of 77 % by Brown's method of disks. There are no regional wall motion abnormalities seen.   2. There is increased LV mass and concentric hypertrophy.   3. There is mild (grade 1) left ventricular diastolic dysfunction.   4. Mild left ventricular hypertrophy.   5. Normal right ventricular cavity size, with normal wall thickness, and normal right ventricular systolic function. Tricuspid annular plane systolic excursion (TAPSE) is 2.0 cm (normal >=1.7 cm).   6. Left atrium is mildly dilated.   7. The right atrium is normal in size.   8. Aortic valve was not well visualized.   9. No significant valvular disease.  10. Pulmonary artery systolic pressure could not be estimated.  11. Agitated saline injection was negative for intracardiac shunt.  12. No pericardial effusion seen.  13. No prior echocardiogram is available for comparison.

## 2025-02-05 NOTE — PROGRESS NOTE ADULT - ATTENDING COMMENTS
Patient was seen and examined this afternoon; Polish  video  Aruna ID#[id number]    73 yo M with pmhx of CVA, PAD, CAD, DM, HTN that is zane in for weakness. Patient to be admitted for acute encephalopathy due to infection vs recent stroke vs medication side effect; Patient noted to be positive for Influenza    Patient with chronic alcohol use needing some Ativan previously none in last 24 hrs again delirious this evening needing Ativan reinstated. Earlier this afternoon, pt was co-operative, seen after SWer talking with patient, admitted alcohol use few times a week with wine and vodka. reported living with wife, son his wife and grand kids total 6 people in house. reported ambulate independently    Vital Signs Last 24 Hrs  T(C): 36.7 (05 Feb 2025 16:12), Max: 37.3 (05 Feb 2025 07:42)  T(F): 98.1 (05 Feb 2025 16:12), Max: 99.1 (05 Feb 2025 07:42)  HR: 63 (05 Feb 2025 16:12) (61 - 92)  BP: 130/56 (05 Feb 2025 16:12) (130/56 - 194/108)  BP(mean): 82 (05 Feb 2025 07:42) (82 - 82)  RR: 18 (05 Feb 2025 16:12) (18 - 19)  SpO2: 96% (05 Feb 2025 16:12) (95% - 98%): room air      P/E:  NAD  AAOx2, does not follow all commands for full neuro exam  CTABL  S1S2 WNL  Abd soft, non tender, BS present   BLLE no edema or calf tenderness    Labs: reviewed as below                               14.0   10.04 )-----------( 85       ( 05 Feb 2025 07:47 )             39.4   02-05    142  |  109[H]  |  13  ----------------------------<  186[H]  2.9[LL]   |  26  |  0.64    Ca    8.6      05 Feb 2025 07:47  Phos  2.8     02-05  Mg     2.0     02-05    TPro  6.0  /  Alb  2.5[L]  /  TBili  1.1  /  DBili  x   /  AST  19  /  ALT  18  /  AlkPhos  68  02-05      MR Angio Head No Cont (02.03.25 @ 11:52)     IMPRESSION:  1.  BRAIN:   Left posterior hippocampal formation acute infarction. Small cerebral hemispheric white matter basal ganglia thalamic and brainstem   remote infarctions. Ischemic white matter disease and atrophy upper range typical for age  2.  ANTERIOR CIRCULATION:    Patent inflow. Patient motion limited exam  3. POSTERIOR CIRCULATION:  Patient motion limits the exam    A/P:  Acute encephalopathy, possibly multifactorial from medication side effect while drinking alcohol, Influenza, recent CVA, alcohol withdrawal   Acute CVA  Thrombocytopenia likely related to Chronic alcohol induced BM suppression as well as Viral infection with Flu  Influenza A positive  Hypokalemia due to decreased intake likely  Hypoalbuminemia/ Moderate Protein Calorie malnutrition  Hx PAD s/p LLE bypass   Hx CAD/ DM/ HTN    Plan:   Cont PRN Ativan for CIWA protocol; was discontinued this morning   Cont Tamiflu to complete 5 days  Avoid anticholinergics, benzodiazepines, limit lines/tubes/tethers/restraints, encourage frequent reorientation/reassurance by staff, encourage good sleep hygiene, adequate lighting  Continue IV Thiamine and folic acid   MRI with acute infarct; asper Neuro needs ILR; d/w EP Eleanora; likely will place loop recorder Thursday  Cont DAPT with  aspirin and Plavix and statin; Monitor LFTs in the setting of A;lchol use plus Flu which also can cause Transaminitis  Replace electrolytes   Monitor platelet count   ISS   Cont home meds for BP  Tamsulosin for BPH  Aspiration, fall and seizure precaution  Lovenox for DVT ppx   Discussed with Housestaff and RN  Discussed with patient and reviewed need Patient was seen and examined this afternoon; Polish  video  Aruna ID#[id number]    75 yo M with pmhx of CVA, PAD, CAD, DM, HTN that is zane in for weakness. Patient to be admitted for acute encephalopathy due to infection vs recent stroke vs medication side effect; Patient noted to be positive for Influenza    Patient with chronic alcohol use needing some Ativan previously none in last 24 hrs again delirious this evening needing Ativan reinstated. Earlier this afternoon, pt was co-operative, seen after SWer talking with patient, admitted alcohol use few times a week with wine and vodka. reported living with wife, son his wife and grand kids total 6 people in house. reported ambulate independently    Vital Signs Last 24 Hrs  T(C): 36.7 (05 Feb 2025 16:12), Max: 37.3 (05 Feb 2025 07:42)  T(F): 98.1 (05 Feb 2025 16:12), Max: 99.1 (05 Feb 2025 07:42)  HR: 63 (05 Feb 2025 16:12) (61 - 92)  BP: 130/56 (05 Feb 2025 16:12) (130/56 - 194/108)  BP(mean): 82 (05 Feb 2025 07:42) (82 - 82)  RR: 18 (05 Feb 2025 16:12) (18 - 19)  SpO2: 96% (05 Feb 2025 16:12) (95% - 98%): room air      P/E:  NAD  AAOx2, does not follow all commands for full neuro exam  CTABL  S1S2 WNL  Abd soft, non tender, BS present   BLLE no edema or calf tenderness    Labs: reviewed as below                               14.0   10.04 )-----------( 85       ( 05 Feb 2025 07:47 )             39.4   02-05    142  |  109[H]  |  13  ----------------------------<  186[H]  2.9[LL]   |  26  |  0.64    Ca    8.6      05 Feb 2025 07:47  Phos  2.8     02-05  Mg     2.0     02-05    TPro  6.0  /  Alb  2.5[L]  /  TBili  1.1  /  DBili  x   /  AST  19  /  ALT  18  /  AlkPhos  68  02-05      MR Angio Head No Cont (02.03.25 @ 11:52)     IMPRESSION:  1.  BRAIN:   Left posterior hippocampal formation acute infarction. Small cerebral hemispheric white matter basal ganglia thalamic and brainstem   remote infarctions. Ischemic white matter disease and atrophy upper range typical for age  2.  ANTERIOR CIRCULATION:    Patent inflow. Patient motion limited exam  3. POSTERIOR CIRCULATION:  Patient motion limits the exam    A/P:  Acute encephalopathy, possibly multifactorial from medication side effect while drinking alcohol, Influenza, recent CVA, alcohol withdrawal   Acute CVA  Thrombocytopenia likely related to Chronic alcohol induced BM suppression as well as Viral infection with Flu  Influenza A positive  Hypokalemia due to decreased intake likely  Hypoalbuminemia/ Moderate Protein Calorie malnutrition  Hx PAD s/p LLE bypass   Hx CAD/ DM/ HTN    Plan:   Cont PRN Ativan for CIWA protocol;     Cont Tamiflu to complete 5 days  Patient planned for ILR tomorrow Dr. Angelita Meneses  Patient also planned for JUANA as per Neurology recs [facility] new CVA plus prior multiple CVA  Discussed with Dr. Washburn; will do JUANA as last case as patient has Flu  Continue IV Thiamine and folic acid   MRI with acute infarct; asper Neuro needs ILR; d/w EP Eleanora; likely will place loop recorder Thursday  Cont DAPT with  aspirin and Plavix and statin; Monitor LFTs in the setting of A;lchol use plus Flu which also can cause Transaminitis  Replace electrolytes   Monitor platelet count   ISS   Cont home meds for BP  Tamsulosin for BPH  Aspiration, fall and seizure precaution  Lovenox for DVT ppx   Discussed with Housestaff and RN  Discussed with patient and reviewed need Patient was seen and examined this afternoon; spoke with Son and Mother this morning     75 yo M with pmhx of CVA, PAD, CAD, DM, HTN that is zane in for weakness. Patient to be admitted for acute encephalopathy due to infection vs recent stroke vs medication side effect; Patient noted to be positive for Influenza    Patient with chronic alcohol use needing some Ativan previously none in last 24 hrs again delirious this evening needing Ativan reinstated. Earlier this afternoon, pt was co-operative, seen after SWer talking with patient, admitted alcohol use few times a week with wine and vodka. reported living with wife, son his wife and grand kids total 6 people in house. reported ambulate independently    Patient was more delirious last evening improved today; planned for loop recorder tomorrow; Neuro recommended JUANA.     Vital Signs Last 24 Hrs  T(C): 36.7 (05 Feb 2025 16:12), Max: 37.3 (05 Feb 2025 07:42)  T(F): 98.1 (05 Feb 2025 16:12), Max: 99.1 (05 Feb 2025 07:42)  HR: 63 (05 Feb 2025 16:12) (61 - 92)  BP: 130/56 (05 Feb 2025 16:12) (130/56 - 194/108)  BP(mean): 82 (05 Feb 2025 07:42) (82 - 82)  RR: 18 (05 Feb 2025 16:12) (18 - 19)  SpO2: 96% (05 Feb 2025 16:12) (95% - 98%): room air    P/E: as above  elderly male, comfortable, NAD  Gait: noted to be slightly ataxic with PT ambulating this afternoon    Labs: reviewed as below                               14.0   10.04 )-----------( 85       ( 05 Feb 2025 07:47 )             39.4   02-05  142  |  109[H]  |  13  ----------------------------<  186[H]  2.9[LL]   |  26  |  0.64    Ca    8.6      05 Feb 2025 07:47  Phos  2.8     02-05  Mg     2.0     02-05  TPro  6.0  /  Alb  2.5[L]  /  TBili  1.1  /  DBili  x   /  AST  19  /  ALT  18  /  AlkPhos  68  02-05    MR Angio Head No Cont (02.03.25 @ 11:52)   IMPRESSION:  1.  BRAIN:   Left posterior hippocampal formation acute infarction. Small cerebral hemispheric white matter basal ganglia thalamic and brainstem   remote infarctions. Ischemic white matter disease and atrophy upper range typical for age  2.  ANTERIOR CIRCULATION:    Patent inflow. Patient motion limited exam  3. POSTERIOR CIRCULATION:  Patient motion limits the exam    A/P:  Acute CVA  Acute encephalopathy, possibly multifactorial from medication side effect while drinking alcohol, Influenza, recent CVA, alcohol withdrawal improved  Thrombocytopenia likely related to Chronic alcohol induced BM suppression as well as Viral infection with Flu  Influenza A positive  Hypokalemia due to decreased intake likely  Hypoalbuminemia/ Moderate Protein Calorie malnutrition  Hx PAD s/p LLE bypass   Hx CAD/ DM/ HTN    Plan:   Cont PRN Ativan for CIWA protocol;   Cont Tamiflu to complete 5 days  Patient planned for ILR tomorrow Dr. Angelita Meneses; Patient with chronic alcohol use increased risk of PAF  Patient also planned for JUANA as per Neurology recs given new CVA plus prior multiple CVA  Discussed with Dr. Washburn; will do JUANA as last case as patient has Flu  Continue IV Thiamine and folic acid; can switch to oral Thiamine AM if stable   Cont DAPT with  aspirin and Plavix and statin; Monitor LFTs in the setting of Alchol use plus Flu which also can cause Transaminitis  Replace electrolytes/ potassium  Monitor platelet count; bleeding risk increases if drop below 50,000  ISS   Cont home meds for BP  Tamsulosin for BPH  Aspiration, fall and seizure precaution  Lovenox for DVT ppx   Discussed with Juliannetalety and RN  Discussed with Son and Mother at length and reviewed all the plan as above; Cardiology team already obtained consent abdulaziz ILR and JUANA tomorrow  D/C Plan hopefully Friday; Pt scoring for Rehab currently but would fair better at home given his history and good support system at home; d/w PT will follow up next 24 to 48 hrs   Discussed with juliannetalety and RN

## 2025-02-05 NOTE — PROGRESS NOTE ADULT - SUBJECTIVE AND OBJECTIVE BOX
Cardiology Progress Note  ------------------------------------------------------------------------------------------  SUBJECTIVE:   No events overnight. Denies CP, SOB or Palpitations. Patient speaks English; however, forgetful and confused  -------------------------------------------------------------------------------------------  ROS:  CV: chest pain (-), palpitation (-), orthopnea (-), PND (-), edema (-)  PULM: SOB (-), cough (-), wheezing (-), hemoptysis (-).   CONST: fever (-), chills (-) or fatigue (-)  GI: abdominal distension (-), abdominal pain (-) , nausea/vomiting (-), hematemesis, (-), melena (-), hematochezia (-)  : dysuria (-), frequency (-), hematuria (-).   NEURO: numbness (-), weakness (-), dizziness (-)  MSK: myalgia (-), joint pain (-)   SKIN: itching (-), rash (-)  HEENT:  visual changes (-); vertigo or throat pain (-);  neck stiffness (-)   Psych: change in mood (-), anxiety (-), depression (-)     All other review of systems is negative unless indicated above.   -------------------------------------------------------------------------------------------  VS:  T(F): 97.2 (02-05), Max: 99.1 (02-05)  HR: 61 (02-05) (61 - 92)  BP: 149/78 (02-05) (139/56 - 194/108)  RR: 18 (02-05)  SpO2: 96% (02-05)  I&O's Summary    ------------------------------------------------------------------------------------------  PHYSICAL EXAM:  Vital Signs were reviewed.  GENERAL: NAD, well-appearing, well nourished.   HEAD:  Atraumatic, Normocephalic.  PERRLA, conjunctiva and sclera clear  ENT: Moist mucous membranes, no external lesions on nose.   NECK: Supple, JVP   CHEST/LUNG: No chest wall tenderness. CTAB  HEART: S1, S2, no murmur   ABDOMEN: Bowel sounds present; Soft, Nontender, Nondistended.   EXTREMITIES: No edema,   NERVOUS SYSTEM:  Alert & Oriented x1,2.  No deficits.   SKIN: No rashes or lesions.    -------------------------------------------------------------------------------------------  LABS:  02-05    142  |  109[H]  |  13  ----------------------------<  186[H]  2.9[LL]   |  26  |  0.64    Ca    8.6      05 Feb 2025 07:47  Phos  2.8     02-05  Mg     2.0     02-05    TPro  6.0  /  Alb  2.5[L]  /  TBili  1.1  /  DBili  x   /  AST  19  /  ALT  18  /  AlkPhos  68  02-05    Creatinine Trend: 0.64<--, 0.78<--, 0.76<--, 0.75<--, 0.87<--, 1.11<--                        14.0   10.04 )-----------( 85       ( 05 Feb 2025 07:47 )             39.4         Lipid Panel: T(F): 97.2 (02-05), Max: 99.1 (02-05)  HR: 61 (02-05) (61 - 92)  BP: 149/78 (02-05) (139/56 - 194/108)  RR: 18 (02-05)  SpO2: 96% (02-05)  Cardiac Enzymes:         -------------------------------------------------------------------------------------------  Meds:  acetaminophen     Tablet .. 650 milliGRAM(s) Oral every 6 hours PRN  artificial  tears Solution 1 Drop(s) Both EYES three times a day  aspirin  chewable 81 milliGRAM(s) Oral daily  atorvastatin 80 milliGRAM(s) Oral at bedtime  carvedilol 3.125 milliGRAM(s) Oral every 12 hours  clopidogrel Tablet 75 milliGRAM(s) Oral daily  enalapril 20 milliGRAM(s) Oral daily  enoxaparin Injectable 40 milliGRAM(s) SubCutaneous every 24 hours  ezetimibe 10 milliGRAM(s) Oral daily  folic acid 1 milliGRAM(s) Oral daily  influenza  Vaccine (HIGH DOSE) 0.5 milliLiter(s) IntraMuscular once  insulin lispro (ADMELOG) corrective regimen sliding scale   SubCutaneous three times a day before meals  insulin lispro (ADMELOG) corrective regimen sliding scale   SubCutaneous at bedtime  LORazepam   Injectable 1 milliGRAM(s) IV Push every 2 hours PRN  LORazepam   Injectable 1 milliGRAM(s) IV Push every 1 hour PRN  multivitamin 1 Tablet(s) Oral daily  ondansetron Injectable 4 milliGRAM(s) IV Push every 8 hours PRN  oseltamivir 75 milliGRAM(s) Oral two times a day  sertraline 25 milliGRAM(s) Oral daily  tamsulosin 0.4 milliGRAM(s) Oral at bedtime  thiamine IVPB 500 milliGRAM(s) IV Intermittent every 8 hours    -------------------------------------------------------------------------------------------  -------------------------------------------------------------------------------------------  Cardiovascular Diagnostic Testing:  -------------------------------------------------------------------------------------------  Telemetry NS HR range 65-75    Echo: < from: TTE W or WO Ultrasound Enhancing Agent (02.04.25 @ 06:53) >  CONCLUSIONS:      1. Left ventricular cavity is normal in size. Left ventricular systolic function is normal with an ejection fraction of 77 % by Brown's method of disks. There are no regional wall motion abnormalities seen.   2. There is increased LV mass and concentric hypertrophy.   3. There is mild (grade 1) left ventricular diastolic dysfunction.   4. Mild left ventricular hypertrophy.   5. Normal right ventricular cavity size, with normal wall thickness, and normal right ventricular systolic function. Tricuspid annular plane systolic excursion (TAPSE) is 2.0 cm (normal >=1.7 cm).   6. Left atrium is mildly dilated.   7. The right atrium is normal in size.   8. Aortic valve was not well visualized.   9. No significant valvular disease.  10. Pulmonary artery systolic pressure could not be estimated.  11. Agitated saline injection was negative for intracardiac shunt.  12. No pericardial effusion seen.  13. No prior echocardiogram is available for comparison.      < end of copied text >        Imaging:< from: US Duplex Carotid Arteries Complete, Bilateral (02.03.25 @ 13:39) >  IMPRESSION:    Although there is prominent calcified atheromatous plaque at the   bifurcations, this examination does not confirm the presence of a high   grade stenosis of the right internal carotid artery.    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      Measurement of carotid stenosis is based on updated recommendations for   carotid stenosis interpretation criteria from the Intersocietal   Accreditation Commission (IAC) Vascular Testing Board, modified from the   Society of Radiologists in Ultrasound (SRU) Consensus Conference Criteria   for Internal Carotid Artery Stenosis.    --- End of Report ---        < end of copied text >  < from: MR Angio Head No Cont (02.03.25 @ 11:52) >  IMPRESSION:    1.  BRAIN:   Left posterior hippocampal formation acute infarction. Small   cerebral hemispheric white matter basal ganglia thalamic and brainstem   remote infarctions. Ischemic white matter disease and atrophy upper range   typical for age    2.  ANTERIOR CIRCULATION:    Patent inflow. Patient motion limited exam    3. POSTERIOR CIRCULATION:  Patient motion limits the exam    --- End of Report ---        < end of copied text >  < from: Xray Chest 1 View- PORTABLE-Urgent (Xray Chest 1 View- PORTABLE-Urgent .) (02.01.25 @ 00:46) >  IMPRESSION: No acute parenchymal disease, no change allowing for technique    < end of copied text >      CXR:  reviewed  -------------------------------------------------------------------------------------------  Assessment and Plan:   -------------------------------------------------------------------------------------------  75 yo M with pmhx of CVA, PAD, CAD, DM, HTN that is zane in for weakness. Patient to be admitted for acute encephalopathy due to infection vs recent stroke vs medication side effect. Cardiology was consulted    Problems Assessed:   1. AMS- neurology following  2. CAD- no angina, stable.   3. HTN- elevated.   4. HLD   5. CVA  6. CIWA protocol     Plan:   • Continue DAPT   • Continue statin   • Continue atenolol, Enalapril 20mg PO daily   • Echo with normal EF  • ILR placement in am   Cardiology Progress Note  ------------------------------------------------------------------------------------------  SUBJECTIVE:   No events overnight. Denies CP, SOB or Palpitations. Patient speaks English; however, forgetful and confused  -------------------------------------------------------------------------------------------  ROS:  CV: chest pain (-), palpitation (-), orthopnea (-), PND (-), edema (-)  PULM: SOB (-), cough (-), wheezing (-), hemoptysis (-).   CONST: fever (-), chills (-) or fatigue (-)  GI: abdominal distension (-), abdominal pain (-) , nausea/vomiting (-), hematemesis, (-), melena (-), hematochezia (-)  : dysuria (-), frequency (-), hematuria (-).   NEURO: numbness (-), weakness (-), dizziness (-)  MSK: myalgia (-), joint pain (-)   SKIN: itching (-), rash (-)  HEENT:  visual changes (-); vertigo or throat pain (-);  neck stiffness (-)   Psych: change in mood (-), anxiety (-), depression (-)     All other review of systems is negative unless indicated above.   -------------------------------------------------------------------------------------------  VS:  T(F): 97.2 (02-05), Max: 99.1 (02-05)  HR: 61 (02-05) (61 - 92)  BP: 149/78 (02-05) (139/56 - 194/108)  RR: 18 (02-05)  SpO2: 96% (02-05)  I&O's Summary    ------------------------------------------------------------------------------------------  PHYSICAL EXAM:  Vital Signs were reviewed.  GENERAL: NAD, well-appearing, well nourished.   HEAD:  Atraumatic, Normocephalic.  PERRLA, conjunctiva and sclera clear  ENT: Moist mucous membranes, no external lesions on nose.   NECK: Supple, JVP   CHEST/LUNG: No chest wall tenderness. CTAB  HEART: S1, S2, no murmur   ABDOMEN: Bowel sounds present; Soft, Nontender, Nondistended.   EXTREMITIES: No edema,   NERVOUS SYSTEM:  Alert & Oriented x1,2.  No deficits.   SKIN: No rashes or lesions.    -------------------------------------------------------------------------------------------  LABS:  02-05    142  |  109[H]  |  13  ----------------------------<  186[H]  2.9[LL]   |  26  |  0.64    Ca    8.6      05 Feb 2025 07:47  Phos  2.8     02-05  Mg     2.0     02-05    TPro  6.0  /  Alb  2.5[L]  /  TBili  1.1  /  DBili  x   /  AST  19  /  ALT  18  /  AlkPhos  68  02-05    Creatinine Trend: 0.64<--, 0.78<--, 0.76<--, 0.75<--, 0.87<--, 1.11<--                        14.0   10.04 )-----------( 85       ( 05 Feb 2025 07:47 )             39.4         Lipid Panel: T(F): 97.2 (02-05), Max: 99.1 (02-05)  HR: 61 (02-05) (61 - 92)  BP: 149/78 (02-05) (139/56 - 194/108)  RR: 18 (02-05)  SpO2: 96% (02-05)  Cardiac Enzymes:         -------------------------------------------------------------------------------------------  Meds:  acetaminophen     Tablet .. 650 milliGRAM(s) Oral every 6 hours PRN  artificial  tears Solution 1 Drop(s) Both EYES three times a day  aspirin  chewable 81 milliGRAM(s) Oral daily  atorvastatin 80 milliGRAM(s) Oral at bedtime  carvedilol 3.125 milliGRAM(s) Oral every 12 hours  clopidogrel Tablet 75 milliGRAM(s) Oral daily  enalapril 20 milliGRAM(s) Oral daily  enoxaparin Injectable 40 milliGRAM(s) SubCutaneous every 24 hours  ezetimibe 10 milliGRAM(s) Oral daily  folic acid 1 milliGRAM(s) Oral daily  influenza  Vaccine (HIGH DOSE) 0.5 milliLiter(s) IntraMuscular once  insulin lispro (ADMELOG) corrective regimen sliding scale   SubCutaneous three times a day before meals  insulin lispro (ADMELOG) corrective regimen sliding scale   SubCutaneous at bedtime  LORazepam   Injectable 1 milliGRAM(s) IV Push every 2 hours PRN  LORazepam   Injectable 1 milliGRAM(s) IV Push every 1 hour PRN  multivitamin 1 Tablet(s) Oral daily  ondansetron Injectable 4 milliGRAM(s) IV Push every 8 hours PRN  oseltamivir 75 milliGRAM(s) Oral two times a day  sertraline 25 milliGRAM(s) Oral daily  tamsulosin 0.4 milliGRAM(s) Oral at bedtime  thiamine IVPB 500 milliGRAM(s) IV Intermittent every 8 hours    -------------------------------------------------------------------------------------------  -------------------------------------------------------------------------------------------  Cardiovascular Diagnostic Testing:  -------------------------------------------------------------------------------------------  Telemetry NS HR range 65-75    Echo: < from: TTE W or WO Ultrasound Enhancing Agent (02.04.25 @ 06:53) >  CONCLUSIONS:      1. Left ventricular cavity is normal in size. Left ventricular systolic function is normal with an ejection fraction of 77 % by Brown's method of disks. There are no regional wall motion abnormalities seen.   2. There is increased LV mass and concentric hypertrophy.   3. There is mild (grade 1) left ventricular diastolic dysfunction.   4. Mild left ventricular hypertrophy.   5. Normal right ventricular cavity size, with normal wall thickness, and normal right ventricular systolic function. Tricuspid annular plane systolic excursion (TAPSE) is 2.0 cm (normal >=1.7 cm).   6. Left atrium is mildly dilated.   7. The right atrium is normal in size.   8. Aortic valve was not well visualized.   9. No significant valvular disease.  10. Pulmonary artery systolic pressure could not be estimated.  11. Agitated saline injection was negative for intracardiac shunt.  12. No pericardial effusion seen.  13. No prior echocardiogram is available for comparison.      < end of copied text >        Imaging:< from: US Duplex Carotid Arteries Complete, Bilateral (02.03.25 @ 13:39) >  IMPRESSION:    Although there is prominent calcified atheromatous plaque at the   bifurcations, this examination does not confirm the presence of a high   grade stenosis of the right internal carotid artery.    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      Measurement of carotid stenosis is based on updated recommendations for   carotid stenosis interpretation criteria from the Intersocietal   Accreditation Commission (IAC) Vascular Testing Board, modified from the   Society of Radiologists in Ultrasound (SRU) Consensus Conference Criteria   for Internal Carotid Artery Stenosis.    --- End of Report ---        < end of copied text >  < from: MR Angio Head No Cont (02.03.25 @ 11:52) >  IMPRESSION:    1.  BRAIN:   Left posterior hippocampal formation acute infarction. Small   cerebral hemispheric white matter basal ganglia thalamic and brainstem   remote infarctions. Ischemic white matter disease and atrophy upper range   typical for age    2.  ANTERIOR CIRCULATION:    Patent inflow. Patient motion limited exam    3. POSTERIOR CIRCULATION:  Patient motion limits the exam    --- End of Report ---        < end of copied text >  < from: Xray Chest 1 View- PORTABLE-Urgent (Xray Chest 1 View- PORTABLE-Urgent .) (02.01.25 @ 00:46) >  IMPRESSION: No acute parenchymal disease, no change allowing for technique    < end of copied text >      CXR:  reviewed  -------------------------------------------------------------------------------------------  Assessment and Plan:   -------------------------------------------------------------------------------------------  75 yo M with pmhx of CVA, PAD, CAD, DM, HTN that is zane in for weakness. Patient was admitted for acute encephalopathy due to infection vs recent stroke vs medication side effect. Cardiology was consulted    Problems Assessed:   1. AMS- neurology following  2. CAD- no angina, stable.   3. HTN- elevated.   4. HLD   5. CVA  6. CIWA protocol     Plan:   • Continue DAPT   • Continue statin   • Continue atenolol, Enalapril 20mg PO daily   • Echo with normal EF  • ILR placement in am   Cardiology Progress Note  ------------------------------------------------------------------------------------------  SUBJECTIVE:   No events overnight. Denies CP, SOB or Palpitations. Patient speaks English; however, forgetful and confused  -------------------------------------------------------------------------------------------  ROS:  CV: chest pain (-), palpitation (-), orthopnea (-), PND (-), edema (-)  PULM: SOB (-), cough (-), wheezing (-), hemoptysis (-).   CONST: fever (-), chills (-) or fatigue (-)  GI: abdominal distension (-), abdominal pain (-) , nausea/vomiting (-), hematemesis, (-), melena (-), hematochezia (-)  : dysuria (-), frequency (-), hematuria (-).   NEURO: numbness (-), weakness (-), dizziness (-)  MSK: myalgia (-), joint pain (-)   SKIN: itching (-), rash (-)  HEENT:  visual changes (-); vertigo or throat pain (-);  neck stiffness (-)   Psych: change in mood (-), anxiety (-), depression (-)     All other review of systems is negative unless indicated above.   -------------------------------------------------------------------------------------------  VS:  T(F): 97.2 (02-05), Max: 99.1 (02-05)  HR: 61 (02-05) (61 - 92)  BP: 149/78 (02-05) (139/56 - 194/108)  RR: 18 (02-05)  SpO2: 96% (02-05)  I&O's Summary    ------------------------------------------------------------------------------------------  PHYSICAL EXAM:  Vital Signs were reviewed.  GENERAL: NAD, well-appearing, well nourished.   HEAD:  Atraumatic, Normocephalic.  PERRLA, conjunctiva and sclera clear  ENT: Moist mucous membranes, no external lesions on nose.   NECK: Supple, JVP   CHEST/LUNG: No chest wall tenderness. CTAB  HEART: S1, S2, no murmur   ABDOMEN: Bowel sounds present; Soft, Nontender, Nondistended.   EXTREMITIES: No edema,   NERVOUS SYSTEM:  Alert & Oriented x1,2.  No deficits.   SKIN: No rashes or lesions.    -------------------------------------------------------------------------------------------  LABS:  02-05    142  |  109[H]  |  13  ----------------------------<  186[H]  2.9[LL]   |  26  |  0.64    Ca    8.6      05 Feb 2025 07:47  Phos  2.8     02-05  Mg     2.0     02-05    TPro  6.0  /  Alb  2.5[L]  /  TBili  1.1  /  DBili  x   /  AST  19  /  ALT  18  /  AlkPhos  68  02-05    Creatinine Trend: 0.64<--, 0.78<--, 0.76<--, 0.75<--, 0.87<--, 1.11<--                        14.0   10.04 )-----------( 85       ( 05 Feb 2025 07:47 )             39.4         Lipid Panel: T(F): 97.2 (02-05), Max: 99.1 (02-05)  HR: 61 (02-05) (61 - 92)  BP: 149/78 (02-05) (139/56 - 194/108)  RR: 18 (02-05)  SpO2: 96% (02-05)  Cardiac Enzymes:         -------------------------------------------------------------------------------------------  Meds:  acetaminophen     Tablet .. 650 milliGRAM(s) Oral every 6 hours PRN  artificial  tears Solution 1 Drop(s) Both EYES three times a day  aspirin  chewable 81 milliGRAM(s) Oral daily  atorvastatin 80 milliGRAM(s) Oral at bedtime  carvedilol 3.125 milliGRAM(s) Oral every 12 hours  clopidogrel Tablet 75 milliGRAM(s) Oral daily  enalapril 20 milliGRAM(s) Oral daily  enoxaparin Injectable 40 milliGRAM(s) SubCutaneous every 24 hours  ezetimibe 10 milliGRAM(s) Oral daily  folic acid 1 milliGRAM(s) Oral daily  influenza  Vaccine (HIGH DOSE) 0.5 milliLiter(s) IntraMuscular once  insulin lispro (ADMELOG) corrective regimen sliding scale   SubCutaneous three times a day before meals  insulin lispro (ADMELOG) corrective regimen sliding scale   SubCutaneous at bedtime  LORazepam   Injectable 1 milliGRAM(s) IV Push every 2 hours PRN  LORazepam   Injectable 1 milliGRAM(s) IV Push every 1 hour PRN  multivitamin 1 Tablet(s) Oral daily  ondansetron Injectable 4 milliGRAM(s) IV Push every 8 hours PRN  oseltamivir 75 milliGRAM(s) Oral two times a day  sertraline 25 milliGRAM(s) Oral daily  tamsulosin 0.4 milliGRAM(s) Oral at bedtime  thiamine IVPB 500 milliGRAM(s) IV Intermittent every 8 hours    -------------------------------------------------------------------------------------------  -------------------------------------------------------------------------------------------  Cardiovascular Diagnostic Testing:  -------------------------------------------------------------------------------------------  Telemetry NS HR range 65-75    Echo: < from: TTE W or WO Ultrasound Enhancing Agent (02.04.25 @ 06:53) >  CONCLUSIONS:      1. Left ventricular cavity is normal in size. Left ventricular systolic function is normal with an ejection fraction of 77 % by Brown's method of disks. There are no regional wall motion abnormalities seen.   2. There is increased LV mass and concentric hypertrophy.   3. There is mild (grade 1) left ventricular diastolic dysfunction.   4. Mild left ventricular hypertrophy.   5. Normal right ventricular cavity size, with normal wall thickness, and normal right ventricular systolic function. Tricuspid annular plane systolic excursion (TAPSE) is 2.0 cm (normal >=1.7 cm).   6. Left atrium is mildly dilated.   7. The right atrium is normal in size.   8. Aortic valve was not well visualized.   9. No significant valvular disease.  10. Pulmonary artery systolic pressure could not be estimated.  11. Agitated saline injection was negative for intracardiac shunt.  12. No pericardial effusion seen.  13. No prior echocardiogram is available for comparison.      < end of copied text >        Imaging:< from: US Duplex Carotid Arteries Complete, Bilateral (02.03.25 @ 13:39) >  IMPRESSION:    Although there is prominent calcified atheromatous plaque at the   bifurcations, this examination does not confirm the presence of a high   grade stenosis of the right internal carotid artery.    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      Measurement of carotid stenosis is based on updated recommendations for   carotid stenosis interpretation criteria from the Intersocietal   Accreditation Commission (IAC) Vascular Testing Board, modified from the   Society of Radiologists in Ultrasound (SRU) Consensus Conference Criteria   for Internal Carotid Artery Stenosis.    --- End of Report ---        < end of copied text >  < from: MR Angio Head No Cont (02.03.25 @ 11:52) >  IMPRESSION:    1.  BRAIN:   Left posterior hippocampal formation acute infarction. Small   cerebral hemispheric white matter basal ganglia thalamic and brainstem   remote infarctions. Ischemic white matter disease and atrophy upper range   typical for age    2.  ANTERIOR CIRCULATION:    Patent inflow. Patient motion limited exam    3. POSTERIOR CIRCULATION:  Patient motion limits the exam    --- End of Report ---        < end of copied text >  < from: Xray Chest 1 View- PORTABLE-Urgent (Xray Chest 1 View- PORTABLE-Urgent .) (02.01.25 @ 00:46) >  IMPRESSION: No acute parenchymal disease, no change allowing for technique    < end of copied text >      CXR:  reviewed  -------------------------------------------------------------------------------------------  Assessment and Plan:   -------------------------------------------------------------------------------------------  73 yo M with pmhx of CVA, PAD, CAD, DM, HTN that is zane in for weakness. Patient was admitted for acute encephalopathy due to infection vs recent stroke vs medication side effect. Cardiology was consulted    Problems Assessed:   1. AMS- neurology following  2. CAD- no angina, stable.   3. HTN- elevated.   4. HLD   5. CVA  6. CIWA protocol     Plan:   • Continue DAPT   • Continue statin   • Continue atenolol, Enalapril 20mg PO daily   • Echo with normal systolic function. No significant valvular disease.   • ILR placement in am

## 2025-02-06 ENCOUNTER — RESULT REVIEW (OUTPATIENT)
Age: 75
End: 2025-02-06

## 2025-02-06 LAB
ALBUMIN SERPL ELPH-MCNC: 2.5 G/DL — LOW (ref 3.5–5)
ALP SERPL-CCNC: 69 U/L — SIGNIFICANT CHANGE UP (ref 40–120)
ALT FLD-CCNC: 18 U/L DA — SIGNIFICANT CHANGE UP (ref 10–60)
ANION GAP SERPL CALC-SCNC: 7 MMOL/L — SIGNIFICANT CHANGE UP (ref 5–17)
AST SERPL-CCNC: 21 U/L — SIGNIFICANT CHANGE UP (ref 10–40)
BILIRUB SERPL-MCNC: 0.9 MG/DL — SIGNIFICANT CHANGE UP (ref 0.2–1.2)
BUN SERPL-MCNC: 16 MG/DL — SIGNIFICANT CHANGE UP (ref 7–18)
CALCIUM SERPL-MCNC: 8.7 MG/DL — SIGNIFICANT CHANGE UP (ref 8.4–10.5)
CHLORIDE SERPL-SCNC: 112 MMOL/L — HIGH (ref 96–108)
CO2 SERPL-SCNC: 24 MMOL/L — SIGNIFICANT CHANGE UP (ref 22–31)
CREAT SERPL-MCNC: 0.62 MG/DL — SIGNIFICANT CHANGE UP (ref 0.5–1.3)
CULTURE RESULTS: SIGNIFICANT CHANGE UP
CULTURE RESULTS: SIGNIFICANT CHANGE UP
EGFR: 102 ML/MIN/1.73M2 — SIGNIFICANT CHANGE UP
GLUCOSE BLDC GLUCOMTR-MCNC: 131 MG/DL — HIGH (ref 70–99)
GLUCOSE BLDC GLUCOMTR-MCNC: 137 MG/DL — HIGH (ref 70–99)
GLUCOSE BLDC GLUCOMTR-MCNC: 168 MG/DL — HIGH (ref 70–99)
GLUCOSE BLDC GLUCOMTR-MCNC: 216 MG/DL — HIGH (ref 70–99)
GLUCOSE SERPL-MCNC: 143 MG/DL — HIGH (ref 70–99)
HCT VFR BLD CALC: 39.9 % — SIGNIFICANT CHANGE UP (ref 39–50)
HGB BLD-MCNC: 14 G/DL — SIGNIFICANT CHANGE UP (ref 13–17)
MAGNESIUM SERPL-MCNC: 1.9 MG/DL — SIGNIFICANT CHANGE UP (ref 1.6–2.6)
MCHC RBC-ENTMCNC: 34.1 PG — HIGH (ref 27–34)
MCHC RBC-ENTMCNC: 35.1 G/DL — SIGNIFICANT CHANGE UP (ref 32–36)
MCV RBC AUTO: 97.1 FL — SIGNIFICANT CHANGE UP (ref 80–100)
NRBC # BLD: 0 /100 WBCS — SIGNIFICANT CHANGE UP (ref 0–0)
NRBC BLD-RTO: 0 /100 WBCS — SIGNIFICANT CHANGE UP (ref 0–0)
PHOSPHATE SERPL-MCNC: 3 MG/DL — SIGNIFICANT CHANGE UP (ref 2.5–4.5)
PLATELET # BLD AUTO: 92 K/UL — LOW (ref 150–400)
POTASSIUM SERPL-MCNC: 3.6 MMOL/L — SIGNIFICANT CHANGE UP (ref 3.5–5.3)
POTASSIUM SERPL-SCNC: 3.6 MMOL/L — SIGNIFICANT CHANGE UP (ref 3.5–5.3)
PROT SERPL-MCNC: 6.1 G/DL — SIGNIFICANT CHANGE UP (ref 6–8.3)
RBC # BLD: 4.11 M/UL — LOW (ref 4.2–5.8)
RBC # FLD: 13.2 % — SIGNIFICANT CHANGE UP (ref 10.3–14.5)
SODIUM SERPL-SCNC: 143 MMOL/L — SIGNIFICANT CHANGE UP (ref 135–145)
SPECIMEN SOURCE: SIGNIFICANT CHANGE UP
SPECIMEN SOURCE: SIGNIFICANT CHANGE UP
WBC # BLD: 10.04 K/UL — SIGNIFICANT CHANGE UP (ref 3.8–10.5)
WBC # FLD AUTO: 10.04 K/UL — SIGNIFICANT CHANGE UP (ref 3.8–10.5)

## 2025-02-06 PROCEDURE — 99233 SBSQ HOSP IP/OBS HIGH 50: CPT

## 2025-02-06 PROCEDURE — 93320 DOPPLER ECHO COMPLETE: CPT | Mod: 26

## 2025-02-06 PROCEDURE — 93312 ECHO TRANSESOPHAGEAL: CPT | Mod: 26

## 2025-02-06 PROCEDURE — 99233 SBSQ HOSP IP/OBS HIGH 50: CPT | Mod: GC

## 2025-02-06 PROCEDURE — 93325 DOPPLER ECHO COLOR FLOW MAPG: CPT | Mod: 26

## 2025-02-06 RX ORDER — ACETAMINOPHEN, DIPHENHYDRAMINE HCL, PHENYLEPHRINE HCL 325; 25; 5 MG/1; MG/1; MG/1
3 TABLET ORAL ONCE
Refills: 0 | Status: COMPLETED | OUTPATIENT
Start: 2025-02-06 | End: 2025-02-06

## 2025-02-06 RX ORDER — CARVEDILOL 6.25 MG
12.5 TABLET ORAL EVERY 12 HOURS
Refills: 0 | Status: DISCONTINUED | OUTPATIENT
Start: 2025-02-06 | End: 2025-02-07

## 2025-02-06 RX ORDER — ENALAPRIL MALEATE 20 MG
20 TABLET ORAL
Refills: 0 | Status: DISCONTINUED | OUTPATIENT
Start: 2025-02-06 | End: 2025-02-07

## 2025-02-06 RX ORDER — THIAMINE HCL 100 MG
250 TABLET ORAL DAILY
Refills: 0 | Status: DISCONTINUED | OUTPATIENT
Start: 2025-02-06 | End: 2025-02-07

## 2025-02-06 RX ADMIN — ENOXAPARIN SODIUM 40 MILLIGRAM(S): 100 INJECTION SUBCUTANEOUS at 05:56

## 2025-02-06 RX ADMIN — Medication 1 MILLIGRAM(S): at 14:44

## 2025-02-06 RX ADMIN — Medication 1 TABLET(S): at 14:44

## 2025-02-06 RX ADMIN — Medication 105 MILLIGRAM(S): at 05:56

## 2025-02-06 RX ADMIN — Medication 20 MILLIGRAM(S): at 18:55

## 2025-02-06 RX ADMIN — Medication 12.5 MILLIGRAM(S): at 18:55

## 2025-02-06 RX ADMIN — Medication 3.12 MILLIGRAM(S): at 05:57

## 2025-02-06 RX ADMIN — TAMSULOSIN HYDROCHLORIDE 0.4 MILLIGRAM(S): 0.4 CAPSULE ORAL at 22:25

## 2025-02-06 RX ADMIN — Medication 20 MILLIGRAM(S): at 07:42

## 2025-02-06 RX ADMIN — ATORVASTATIN CALCIUM 80 MILLIGRAM(S): 80 TABLET, FILM COATED ORAL at 22:23

## 2025-02-06 RX ADMIN — Medication 1: at 17:00

## 2025-02-06 RX ADMIN — Medication 1 DROP(S): at 05:56

## 2025-02-06 RX ADMIN — ASPIRIN 81 MILLIGRAM(S): 81 TABLET, COATED ORAL at 14:45

## 2025-02-06 RX ADMIN — Medication 10 MILLIGRAM(S): at 14:40

## 2025-02-06 RX ADMIN — ACETAMINOPHEN, DIPHENHYDRAMINE HCL, PHENYLEPHRINE HCL 3 MILLIGRAM(S): 325; 25; 5 TABLET ORAL at 22:23

## 2025-02-06 RX ADMIN — Medication 25 MILLIGRAM(S): at 14:40

## 2025-02-06 RX ADMIN — Medication 205 MILLIGRAM(S): at 14:40

## 2025-02-06 RX ADMIN — Medication 75 MILLIGRAM(S): at 14:45

## 2025-02-06 RX ADMIN — Medication 1 DROP(S): at 14:45

## 2025-02-06 NOTE — PROGRESS NOTE ADULT - PROBLEM SELECTOR PLAN 4
[facility] High-grade atherosclerotic stenosis involving the origin of the right  internal carotid artery, (roughly 90%)  B/L US Carotid prominent calcified atheromatous plaque at the bifurcations cannot confirm high grade stenosis of the R internal carotid artery, does confirm a flow-limiting stenosis of the L external carotid artery and of a hypoplastic R vertebral artery.  Vasc recc -no acute  intervention at this time (especially in acute stroke setting)  -Continue ASA/Statin/Plavix  -outpatient follow up
-in the ED meeting sirs criteria  -in the ED influenza +  -c/w tamiflu  -IV fluids  - incentive spirometer
[facility] High-grade atherosclerotic stenosis involving the origin of the right  internal carotid artery, (roughly 90%)  B/L US Carotid prominent calcified atheromatous plaque at the bifurcations cannot confirm high grade stenosis of the R internal carotid artery, does confirm a flow-limiting stenosis of the L external carotid artery and of a hypoplastic R vertebral artery.  Vasc recc -no acute  intervention at this time (especially in acute stroke setting)  -Continue ASA/Statin/Plavix  -outpatient follow up

## 2025-02-06 NOTE — PROGRESS NOTE ADULT - PROBLEM SELECTOR PLAN 9
c/w coreg 12.5mg BID, ENALAPRIL 20MG QD  dash diet
c/w home meds   dash diet
c/w coreg 3.125 BID, ENALAPRIL 20MG QD  dash diet

## 2025-02-06 NOTE — PROGRESS NOTE ADULT - PROBLEM SELECTOR PROBLEM 4
Type A influenza
Carotid artery stenosis

## 2025-02-06 NOTE — PROGRESS NOTE ADULT - SUBJECTIVE AND OBJECTIVE BOX
Cardiology Progress Note  ------------------------------------------------------------------------------------------  SUBJECTIVE:   No events overnight. Denies CP, SOB or Palpitations.   -------------------------------------------------------------------------------------------  ROS:  CV: chest pain (-), palpitation (-), orthopnea (-), PND (-), edema (-)  PULM: SOB (-), cough (-), wheezing (-), hemoptysis (-).   CONST: fever (-), chills (-) or fatigue (-)  GI: abdominal distension (-), abdominal pain (-) , nausea/vomiting (-), hematemesis, (-), melena (-), hematochezia (-)  : dysuria (-), frequency (-), hematuria (-).   NEURO: numbness (-), weakness (-), dizziness (-)  MSK: myalgia (-), joint pain (-)   SKIN: itching (-), rash (-)  HEENT:  visual changes (-); vertigo or throat pain (-);  neck stiffness (-)   Psych: change in mood (-), anxiety (-), depression (-)     All other review of systems is negative unless indicated above.   -------------------------------------------------------------------------------------------  VS:  T(F): 98.4 (02-06), Max: 98.4 (02-06)  HR: 70 (02-06) (61 - 72)  BP: 158/81 (02-06) (130/56 - 158/81)  RR: 18 (02-06)  SpO2: 94% (02-06)  I&O's Summary    ------------------------------------------------------------------------------------------  PHYSICAL EXAM:  Vital Signs were reviewed.  GENERAL: NAD, well-appearing, well nourished.   HEAD:  Atraumatic, Normocephalic.  PERRLA, conjunctiva and sclera clear  ENT: Moist mucous membranes, no external lesions on nose.   NECK: Supple, JVP   CHEST/LUNG: No chest wall tenderness. CTAB  HEART: S1, S2, no murmur   ABDOMEN: Bowel sounds present; Soft, Nontender, Nondistended.   EXTREMITIES: No edema,   NERVOUS SYSTEM:  Alert & Oriented X3, speech clear. No deficits.   SKIN: No rashes or lesions.    -------------------------------------------------------------------------------------------  LABS:  02-06    143  |  112[H]  |  16  ----------------------------<  143[H]  3.6   |  24  |  0.62    Ca    8.7      06 Feb 2025 07:50  Phos  3.0     02-06  Mg     1.9     02-06    TPro  6.1  /  Alb  2.5[L]  /  TBili  0.9  /  DBili  x   /  AST  21  /  ALT  18  /  AlkPhos  69  02-06    Creatinine Trend: 0.62<--, 0.64<--, 0.78<--, 0.76<--, 0.75<--, 0.87<--                        14.0   10.04 )-----------( 92       ( 06 Feb 2025 07:50 )             39.9         Lipid Panel: T(F): 98.4 (02-06), Max: 98.4 (02-06)  HR: 70 (02-06) (61 - 72)  BP: 158/81 (02-06) (130/56 - 158/81)  RR: 18 (02-06)  SpO2: 94% (02-06)  Cardiac Enzymes:         -------------------------------------------------------------------------------------------  Meds:  acetaminophen     Tablet .. 650 milliGRAM(s) Oral every 6 hours PRN  artificial  tears Solution 1 Drop(s) Both EYES three times a day  aspirin  chewable 81 milliGRAM(s) Oral daily  atorvastatin 80 milliGRAM(s) Oral at bedtime  carvedilol 3.125 milliGRAM(s) Oral every 12 hours  clopidogrel Tablet 75 milliGRAM(s) Oral daily  enalapril 20 milliGRAM(s) Oral daily  enoxaparin Injectable 40 milliGRAM(s) SubCutaneous every 24 hours  ezetimibe 10 milliGRAM(s) Oral daily  folic acid 1 milliGRAM(s) Oral daily  influenza  Vaccine (HIGH DOSE) 0.5 milliLiter(s) IntraMuscular once  insulin lispro (ADMELOG) corrective regimen sliding scale   SubCutaneous three times a day before meals  insulin lispro (ADMELOG) corrective regimen sliding scale   SubCutaneous at bedtime  lidocaine 1% Injectable 10 milliLiter(s) Local Injection once  LORazepam   Injectable 1 milliGRAM(s) IV Push every 2 hours PRN  LORazepam   Injectable 1 milliGRAM(s) IV Push every 1 hour PRN  multivitamin 1 Tablet(s) Oral daily  ondansetron Injectable 4 milliGRAM(s) IV Push every 8 hours PRN  sertraline 25 milliGRAM(s) Oral daily  tamsulosin 0.4 milliGRAM(s) Oral at bedtime  thiamine IVPB 250 milliGRAM(s) IV Intermittent daily    -------------------------------------------------------------------------------------------  Cardiovascular Diagnostic Testing:  -------------------------------------------------------------------------------------------  ECG:     Echo:     Stress Testing:    Cath:    Imaging:    CXR:  reviewed  -------------------------------------------------------------------------------------------  Assessment and Plan:   -------------------------------------------------------------------------------------------  Problems Assessed:   1.  2.  3.  4.    Plan:   •  •  •  •     Cardiology Progress Note  ------------------------------------------------------------------------------------------  SUBJECTIVE:   No events overnight. Denies CP, SOB or Palpitations. Still confused. s/p JUANA today  -------------------------------------------------------------------------------------------  ROS:  CV: chest pain (-), palpitation (-), orthopnea (-), PND (-), edema (-)  PULM: SOB (-), cough (-), wheezing (-), hemoptysis (-).   CONST: fever (-), chills (-) or fatigue (-)  GI: abdominal distension (-), abdominal pain (-) , nausea/vomiting (-), hematemesis, (-), melena (-), hematochezia (-)  : dysuria (-), frequency (-), hematuria (-).   NEURO: numbness (-), weakness (-), dizziness (-)  MSK: myalgia (-), joint pain (-)   SKIN: itching (-), rash (-)  HEENT:  visual changes (-); vertigo or throat pain (-);  neck stiffness (-)   Psych: change in mood (-), anxiety (-), depression (-)     All other review of systems is negative unless indicated above.   -------------------------------------------------------------------------------------------  VS:  T(F): 98.4 (02-06), Max: 98.4 (02-06)  HR: 70 (02-06) (61 - 72)  BP: 158/81 (02-06) (130/56 - 158/81)  RR: 18 (02-06)  SpO2: 94% (02-06)  I&O's Summary    ------------------------------------------------------------------------------------------  PHYSICAL EXAM:  Vital Signs were reviewed.  GENERAL: NAD, well-appearing, well nourished.   HEAD:  Atraumatic, Normocephalic.  PERRLA, conjunctiva and sclera clear  ENT: Moist mucous membranes, no external lesions on nose.   NECK: Supple, JVP   CHEST/LUNG: No chest wall tenderness. CTAB  HEART: S1, S2, no murmur   ABDOMEN: Bowel sounds present; Soft, Nontender, Nondistended.   EXTREMITIES: No edema,   NERVOUS SYSTEM:  Alert & Oriented X1,2, speech clear. No deficits.   SKIN: No rashes or lesions.    -------------------------------------------------------------------------------------------  LABS:  02-06    143  |  112[H]  |  16  ----------------------------<  143[H]  3.6   |  24  |  0.62    Ca    8.7      06 Feb 2025 07:50  Phos  3.0     02-06  Mg     1.9     02-06    TPro  6.1  /  Alb  2.5[L]  /  TBili  0.9  /  DBili  x   /  AST  21  /  ALT  18  /  AlkPhos  69  02-06    Creatinine Trend: 0.62<--, 0.64<--, 0.78<--, 0.76<--, 0.75<--, 0.87<--                        14.0   10.04 )-----------( 92       ( 06 Feb 2025 07:50 )             39.9         Lipid Panel: T(F): 98.4 (02-06), Max: 98.4 (02-06)  HR: 70 (02-06) (61 - 72)  BP: 158/81 (02-06) (130/56 - 158/81)  RR: 18 (02-06)  SpO2: 94% (02-06)  Cardiac Enzymes:         -------------------------------------------------------------------------------------------  Meds:  acetaminophen     Tablet .. 650 milliGRAM(s) Oral every 6 hours PRN  artificial  tears Solution 1 Drop(s) Both EYES three times a day  aspirin  chewable 81 milliGRAM(s) Oral daily  atorvastatin 80 milliGRAM(s) Oral at bedtime  carvedilol 3.125 milliGRAM(s) Oral every 12 hours  clopidogrel Tablet 75 milliGRAM(s) Oral daily  enalapril 20 milliGRAM(s) Oral daily  enoxaparin Injectable 40 milliGRAM(s) SubCutaneous every 24 hours  ezetimibe 10 milliGRAM(s) Oral daily  folic acid 1 milliGRAM(s) Oral daily  influenza  Vaccine (HIGH DOSE) 0.5 milliLiter(s) IntraMuscular once  insulin lispro (ADMELOG) corrective regimen sliding scale   SubCutaneous three times a day before meals  insulin lispro (ADMELOG) corrective regimen sliding scale   SubCutaneous at bedtime  lidocaine 1% Injectable 10 milliLiter(s) Local Injection once  LORazepam   Injectable 1 milliGRAM(s) IV Push every 2 hours PRN  LORazepam   Injectable 1 milliGRAM(s) IV Push every 1 hour PRN  multivitamin 1 Tablet(s) Oral daily  ondansetron Injectable 4 milliGRAM(s) IV Push every 8 hours PRN  sertraline 25 milliGRAM(s) Oral daily  tamsulosin 0.4 milliGRAM(s) Oral at bedtime  thiamine IVPB 250 milliGRAM(s) IV Intermittent daily      Cardiovascular Diagnostic Testing:  -------------------------------------------------------------------------------------------  Telemetry NS HR range 65-75    Echo:      < from: JUANA W or WO Ultrasound Enhancing Agent (02.06.25 @ 12:30) >  CONCLUSIONS:      1. No cardiac source of embolism was identified.   2. Left ventricular systolic function is normal with an ejection fraction visually estimated at 55 to 60 %.   3. There is mild calcification of the mitral valve annulus.   4. Trace tricuspid regurgitation.   5. Trace pulmonic regurgitation.   6. Mild non-mobile atheroma in the visualized portions of the transverse aortic arch. Mild non-mobile atheroma in the visualized portions ofthe descending aorta.   7. No pericardial effusion seen.   8. Compared to the transthoracic echocardiogram performed on 2/4/2025, a JUANA was performed and additional information provided.    < end of copied text >           < from: TTE W or WO Ultrasound Enhancing Agent (02.04.25 @ 06:53) >  CONCLUSIONS:      1. Left ventricular cavity is normal in size. Left ventricular systolic function is normal with an ejection fraction of 77 % by Brown's method of disks. There are no regional wall motion abnormalities seen.   2. There is increased LV mass and concentric hypertrophy.   3. There is mild (grade 1) left ventricular diastolic dysfunction.   4. Mild left ventricular hypertrophy.   5. Normal right ventricular cavity size, with normal wall thickness, and normal right ventricular systolic function. Tricuspid annular plane systolic excursion (TAPSE) is 2.0 cm (normal >=1.7 cm).   6. Left atrium is mildly dilated.   7. The right atrium is normal in size.   8. Aortic valve was not well visualized.   9. No significant valvular disease.  10. Pulmonary artery systolic pressure could not be estimated.  11. Agitated saline injection was negative for intracardiac shunt.  12. No pericardial effusion seen.  13. No prior echocardiogram is available for comparison.      < end of copied text >        Imaging:< from: US Duplex Carotid Arteries Complete, Bilateral (02.03.25 @ 13:39) >  IMPRESSION:    Although there is prominent calcified atheromatous plaque at the   bifurcations, this examination does not confirm the presence of a high   grade stenosis of the right internal carotid artery.    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      Measurement of carotid stenosis is based on updated recommendations for   carotid stenosis interpretation criteria from the Intersocietal   Accreditation Commission (IAC) Vascular Testing Board, modified from the   Society of Radiologists in Ultrasound (SRU) Consensus Conference Criteria   for Internal Carotid Artery Stenosis.    --- End of Report ---        < end of copied text >  < from: MR Angio Head No Cont (02.03.25 @ 11:52) >  IMPRESSION:    1.  BRAIN:   Left posterior hippocampal formation acute infarction. Small   cerebral hemispheric white matter basal ganglia thalamic and brainstem   remote infarctions. Ischemic white matter disease and atrophy upper range   typical for age    2.  ANTERIOR CIRCULATION:    Patent inflow. Patient motion limited exam    3. POSTERIOR CIRCULATION:  Patient motion limits the exam    --- End of Report ---        < end of copied text >  < from: Xray Chest 1 View- PORTABLE-Urgent (Xray Chest 1 View- PORTABLE-Urgent .) (02.01.25 @ 00:46) >  IMPRESSION: No acute parenchymal disease, no change allowing for technique    < end of copied text >      CXR:  reviewed  -------------------------------------------------------------------------------------------  Assessment and Plan:   -------------------------------------------------------------------------------------------  75 yo M with pmhx of CVA, PAD, CAD, DM, HTN that is brought in for weakness. Patient was admitted for acute encephalopathy due to infection vs recent stroke vs medication side effect. Cardiology was consulted    Problems Assessed:   1. AMS- neurology following  2. CAD- no angina, stable.   3. HTN- elevated.   4. HLD   5. CVA  6. CIWA protocol     Plan:   • Continue DAPT   • Continue statin   • Continue Enalapril 20mg PO daily   •Increase Coreg to 12.5mg PO bid  • Echo with normal systolic function. No significant valvular disease.   • JUANA revealed-no cardiac source of embolism was identified.  • patient has an appointment with Dr. Goldstein on 2/19/2024 @ 10:15a ([place]) for outpatient ILR placement

## 2025-02-06 NOTE — PROGRESS NOTE ADULT - PROBLEM SELECTOR PLAN 7
As per chart review: Reports he drinks approximately 1 bottle of wine every day and last drink was two days ago.  CIWA: 0 on 2/2  thiamine, folic acid, multivitamin for Wernicke's then 250mg then thiamine 100mg PO qd  - f/u ABD US to r/o cirrhosis  - trend LFTs
hx of DM on oraal meds   -ISS   -diabetic diet
As per chart review: Reports he drinks approximately 1 bottle of wine every day and last drink was two days ago.  CIWA: 0 on 2/2  thiamine, folic acid, multivitamin for Wernicke's then 250mg then thiamine 100mg PO qd  ABD US: Nodular liver, cholelithiasis  - trend LFTs
As per chart review: Reports he drinks approximately 1 bottle of wine every day and last drink was two days ago.  CIWA: 0 on 2/2  thiamine, folic acid, multivitamin for Wernicke's then 250mg then thiamine 100mg PO qd  ABD US: Nodular liver, cholelithiasis  - trend LFTs
As per chart review: Reports he drinks approximately 1 bottle of wine every day and last drink was two days ago.  CIWA: 0 on 2/2  thiamine, folic acid, multivitamin for Wernicke's then 250mg then thiamine 100mg PO qd  ativan 1mg prn for symptom trigger  CIWA protocol  - f/u ABD US to r/o cirrhosis

## 2025-02-06 NOTE — PROGRESS NOTE ADULT - PROBLEM SELECTOR PROBLEM 8
DM (diabetes mellitus)
HTN (hypertension)

## 2025-02-06 NOTE — PROGRESS NOTE ADULT - PROBLEM SELECTOR PLAN 5
- c/w tamiflu and ICS
- c/w tamiflu and ICS
noted to have thrombocytopenia to 88 at admission  -unknown baseline   -can be 2/2 alcohol use  -no signs of active bleeding   -monitor platelets  - f/u INR/PTT/PT
-in the ED meeting sirs criteria, influenza +  -c/w tamiflu  -IVF  - incentive spirometer
- c/w tamiflu and ICS

## 2025-02-06 NOTE — PROGRESS NOTE ADULT - PROBLEM SELECTOR PLAN 3
last CVA 1-2years ago  CT showing Bihemispheric areas of increased Tmax most pronounced along the region of the right brachium pontis and right cerebellum. No decreased CBF or CBV appreciated  MR H and MRA: L posterior hippocampal formation acute infarct, small cerebral hemispheric white matter basal ganglia thalamic and brainstem remote infarcts  NIHSS at bedside 0 EEG negative TTE:  EF77%, increased LV mass and concentric hypertrophy, mild (grade 1) left ventricular diastolic dysfunction, mild LVH, LA mildly dilated, no PFO  Cards: c/w DAPT, statin, current mgmt  Neuro: Wernicke's mgmt (day 2), B12 2000, Folate 12.3, TSH 1.39, HIV, Vit D, Thiamine, Routine EEG, and ILR  • JUANA revealed-neg  • appointment with Dr. Goldstein on 2/19/2024 @ 10:15a ([place]) for outpatient ILR placement  - tele monitoring   - PT [facility] KIM
states that 4 days ago had a stroke treated at [facility]??  CT showing Bihemispheric areas of increased Tmax most pronounced along the region of the right brachium pontis and right cerebellum. No decreased CBF or CBV appreciated  MR H and MRA: L posterior hippocampal formation acute infarct, small cerebral hemispheric white matter basal ganglia thalamic and brainstem remote infarcts  NIHSS at bedside 0  Cards: c/w DAPT, statin, atenolol and enalapril at current dose  Neuro: Wernicke's mgmt (day 2), B12 2000, Folate 12.3, TSH 1.39, HIV, Vit D, Thiamine, Routine EEG, and ILR  f/u TTE  > JUANA?  - EP eval for ILR placement upon discharge  - tele monitoring   - PT recc KIM
-states that 4 days ago had a stroke treated at [facility]   -in the ED CT showing Bihemispheric areas of increased Tmax most pronounced along the region of the right brachium pontis and right cerebellum. No decreased CBF or CBV appreciated. High-grade atherosclerotic stenosis involving the origin of the right  internal carotid artery, (roughly 90%)  -NIHS at bedside 0  -will c/w asa/plavix and increase to atorvastatin 80mg nightly since no signs of hemorrhage   -tele monitoring   -f/u MR head and MRA head and neck  - f/u TTE  -neuro following Dr. Niño
-states that 4 days ago had a stroke treated at [facility]   -in the ED CT showing Bihemispheric areas of increased Tmax most pronounced along the region of the right brachium pontis and right cerebellum. No decreased CBF or CBV appreciated.   -NIHSS at bedside 0  -will c/w asa/plavix and increase to atorvastatin 80mg nightly since no signs of hemorrhage   -tele monitoring   MR H and MRA: L posterior hippocampal formation acute infarct, small cerebral hemispheric white matter basal ganglia thalamic and brainstem remote infarcts  Cards: c/w DAPT, statin, atenolol and enalapril at current dose. may increase Enalapril tomorrow if BP persistently elevated  Neuro following and recc: Wernicke's mgmt, B12, Folate, TSH, HIV, Vit D, Thiamine, Routine EEG, and ILR  - f/u EP consult reccs  - f/u TTE > JUANA?  - PT recc KIM
last CVA 1-2years ago  CT showing Bihemispheric areas of increased Tmax most pronounced along the region of the right brachium pontis and right cerebellum. No decreased CBF or CBV appreciated  MR H and MRA: L posterior hippocampal formation acute infarct, small cerebral hemispheric white matter basal ganglia thalamic and brainstem remote infarcts  NIHSS at bedside 0 EEG negative TTE:  EF77%, increased LV mass and concentric hypertrophy, mild (grade 1) left ventricular diastolic dysfunction, mild LVH, LA mildly dilated, no PFO  Cards: c/w DAPT, statin, current mgmt  Neuro: Wernicke's mgmt (day 2), B12 2000, Folate 12.3, TSH 1.39, HIV, Vit D, Thiamine, Routine EEG, and ILR  - pending ILR 2/6, JUANA 2/6  - tele monitoring   - PT rec KIM

## 2025-02-06 NOTE — PROGRESS NOTE ADULT - ATTENDING COMMENTS
Patient was seen and examined this afternoon; spoke with Son and Mother this morning     75 yo M with pmhx of CVA, PAD, CAD, DM, HTN that is zane in for weakness. Patient to be admitted for acute encephalopathy due to infection vs recent stroke vs medication side effect; Patient noted to be positive for Influenza    Patient with chronic alcohol use needing some Ativan previously none in last 24 hrs again delirious this evening needing Ativan reinstated. Earlier this afternoon, pt was co-operative, seen after SWer talking with patient, admitted alcohol use few times a week with wine and vodka. reported living with wife, son his wife and grand kids total 6 people in house. reported ambulate independently    Patient was more delirious last evening improved today; planned for loop recorder tomorrow; Neuro recommended JUANA.     Vital Signs Last 24 Hrs  T(C): 36.5 (06 Feb 2025 15:48), Max: 37.3 (06 Feb 2025 11:45)  T(F): 97.7 (06 Feb 2025 15:48), Max: 99.1 (06 Feb 2025 11:45)  HR: 64 (06 Feb 2025 15:48) (62 - 81)  BP: 174/73 (06 Feb 2025 15:48) (139/57 - 174/73)  RR: 18 (06 Feb 2025 15:48) (18 - 18)  SpO2: 95% (06 Feb 2025 15:48) (93% - 98%): room air      P/E: as above  elderly male, comfortable, NAD  Gait: noted to be slightly ataxic with PT ambulating this afternoon    Labs: reviewed as below                               14.0   10.04 )-----------( 92       ( 06 Feb 2025 07:50 )             39.9   02-06    143  |  112[H]  |  16  ----------------------------<  143[H]  3.6   |  24  |  0.62    Ca    8.7      06 Feb 2025 07:50  Phos  3.0     02-06  Mg     1.9     02-06    TPro  6.1  /  Alb  2.5[L]  /  TBili  0.9  /  DBili  x   /  AST  21  /  ALT  18  /  AlkPhos  69  02-06    MR Angio Head No Cont (02.03.25 @ 11:52)   IMPRESSION:  1.  BRAIN:   Left posterior hippocampal formation acute infarction. Small cerebral hemispheric white matter basal ganglia thalamic and brainstem   remote infarctions. Ischemic white matter disease and atrophy upper range typical for age  2.  ANTERIOR CIRCULATION:    Patent inflow. Patient motion limited exam  3. POSTERIOR CIRCULATION:  Patient motion limits the exam    A/P:  Acute CVA  Acute encephalopathy, possibly multifactorial from medication side effect while drinking alcohol, Influenza, recent CVA, alcohol withdrawal improved  Thrombocytopenia likely related to Chronic alcohol induced BM suppression as well as Viral infection with Flu  Influenza A positive  Hypokalemia due to decreased intake likely  Hypoalbuminemia/ Moderate Protein Calorie malnutrition  Hx PAD s/p LLE bypass   Hx CAD/ DM/ HTN    Plan:   Cont PRN Ativan for CIWA protocol;   Cont Tamiflu to complete 5 days  Patient planned for ILR tomorrow Dr. Angelita Meneses; Patient with chronic alcohol use increased risk of PAF  Patient also planned for JUANA as per Neurology recs given new CVA plus prior multiple CVA  Discussed with Dr. Washburn; will do JUANA as last case as patient has Flu  Continue IV Thiamine and folic acid; can switch to oral Thiamine AM if stable   Cont DAPT with  aspirin and Plavix and statin; Monitor LFTs in the setting of Alchol use plus Flu which also can cause Transaminitis  Replace electrolytes/ potassium  Monitor platelet count; bleeding risk increases if drop below 50,000  ISS   Cont home meds for BP  Tamsulosin for BPH  Aspiration, fall and seizure precaution  Lovenox for DVT ppx   Discussed with Housestalety and RN  Discussed with Son and Mother at length and reviewed all the plan as above; Cardiology team already obtained consent abdulaziz ILR and JUANA tomorrow  D/C Plan hopefully Friday; Pt scoring for Rehab currently but would fair better at home given his history and good support system at home; d/w PT will follow up next 24 to 48 hrs   Discussed with housestalety and RN Patient was seen and examined this afternoon after return from JUANA    73 yo M with pmhx of CVA, PAD, CAD, DM, HTN that is zane in for weakness. Patient to be admitted for acute encephalopathy due to infection vs recent stroke vs medication side effect; Patient noted to be positive for Influenza; Patient noted to have acute CVA with evidence of prior stroke in thalamus and brain stem seen by Neuro recommended JUANA; Patient already on ASA and Plavix after PAD stent     Patient with chronic alcohol use reported living with wife, son his wife and grand kids total 6 people in house. reported ambulate independently prior to admission; Prior delirium couple of days ago has significantly improved; Patient with gait imbalance and recommended KIM PT followed up with much improved gait today but still needs a rolling walker to for gait stability and prevent falls    Vital Signs Last 24 Hrs  T(C): 36.5 (06 Feb 2025 15:48), Max: 37.3 (06 Feb 2025 11:45)  T(F): 97.7 (06 Feb 2025 15:48), Max: 99.1 (06 Feb 2025 11:45)  HR: 64 (06 Feb 2025 15:48) (62 - 81)  BP: 174/73 (06 Feb 2025 15:48) (139/57 - 174/73)  RR: 18 (06 Feb 2025 15:48) (18 - 18)  SpO2: 95% (06 Feb 2025 15:48) (93% - 98%): room air      P/E: as above  elderly male, comfortable, NAD, not delirious at this time  Gait: much more balanced with walker  Psych: AAO x3  Neuro: No new gross focal deficits; Power and sensation intact  CVS: S1S2 present, regular, no edema  Resp: BLAE+, No wheeze or Rhonchi  GI: Soft, BS+, Non tender, non distended  Extr: No  calf tenderness B/L Lower extremities  Skin: Warm and moist without any rashes    Labs: reviewed as below                               14.0   10.04 )-----------( 92       ( 06 Feb 2025 07:50 )             39.9   02-06  143  |  112[H]  |  16  ----------------------------<  143[H]  3.6   |  24  |  0.62    Ca    8.7      06 Feb 2025 07:50  Phos  3.0     02-06  Mg     1.9     02-06  TPro  6.1  /  Alb  2.5[L]  /  TBili  0.9  /  DBili  x   /  AST  21  /  ALT  18  /  AlkPhos  69  02-06    MR Angio Head No Cont (02.03.25 @ 11:52)   IMPRESSION:  1.  BRAIN:   Left posterior hippocampal formation acute infarction. Small cerebral hemispheric white matter basal ganglia thalamic and brainstem   remote infarctions. Ischemic white matter disease and atrophy upper range typical for age  2.  ANTERIOR CIRCULATION:    Patent inflow. Patient motion limited exam  3. POSTERIOR CIRCULATION:  Patient motion limits the exam    A/P:  Acute CVA Left posterior hippocampus with old multiple remote infarctions  Acute Metabolic encephalopathy, with chronic alcohol use,  Influenza, recent CVA, alcohol withdrawal improved  Thrombocytopenia likely related to Chronic alcohol induced BM suppression as well as Viral infection with Flu improving  Influenza A positive  Hypokalemia due to decreased intake likely  Hypoalbuminemia/ Moderate Protein Calorie malnutrition  Hx PAD s/p LLE bypass   Hx CAD/ DM/ HTN    Plan:   Cont PRN Ativan for CIWA protocol;   Cont Tamiflu to complete 5 days  Patient planned for ILR today but postponed to outpt appt with Dr. Dr. Angelita Meneses 2/19/25; Patient with chronic alcohol use increased risk of PAF  Patient s/p JUANA as per Neurology recs given new CVA plus prior multiple CVA negative   can switch to oral Thiamine and continue folic acid  Cont DAPT with  aspirin and Plavix and statin; Monitor LFTs in the setting of Alchol use plus Flu which also can cause Thrombocytopenia  Replace electrolytes/ potassium  Monitor platelet count; much improved  ISS   Cont home meds for BP  Tamsulosin for BPH  Aspiration, fall and seizure precaution  Lovenox for DVT ppx   Discussed with Housestaff and RN  Discussed with Son and Mother at length yesterday;  and reviewed all the plan as above;   Will update and plan discharge if remain stable tomorrow;   D/C Plan AM; Pt scoring for Rehab initially but would fair better at home given his history and good support system at home; d/w PT follow up recommending Home PT with RWer and shower chair  Discussed with housestaff and RN

## 2025-02-06 NOTE — PROGRESS NOTE ADULT - PROBLEM SELECTOR PLAN 11
c/w home meds   dash diet
c/w home meds   dash diet
DVT prophy  lovenox
c/w home meds   dash diet
c/w home meds   dash diet

## 2025-02-06 NOTE — PROGRESS NOTE ADULT - SUBJECTIVE AND OBJECTIVE BOX
PGY-1 Progress Note discussed with attending    PAGER #: [ Teams ] TILL 5:00 PM  PLEASE CONTACT ON CALL TEAM:  - On Call Team (Please refer to Abhishek) FROM 5:00 PM - 8:30PM  - Nightfloat Team FROM 8:30 -7:30 AM    CHIEF COMPLAINT & BRIEF HOSPITAL COURSE: Patient is a 74y old  Male who presents with a chief complaint of AMS (06 Feb 2025 11:13)      INTERVAL HPI/OVERNIGHT EVENTS: Patient seen at bedside this AM, AAOx2, no acute complaints.      REVIEW OF SYSTEMS:  CONSTITUTIONAL: No fever  RESPIRATORY: No cough, wheezing, chills or hemoptysis; No shortness of breath  CARDIOVASCULAR: No chest pain, palpitations, dizziness, or leg swelling  GASTROINTESTINAL: No abdominal pain. No nausea, vomiting, or hematemesis; No diarrhea or constipation. No melena or hematochezia.  GENITOURINARY: No dysuria or hematuria, urinary frequency  NEUROLOGICAL: No headaches  SKIN: No itching, burning, rashes, or lesions     Vital Signs Last 24 Hrs  T(C): 36.5 (06 Feb 2025 15:48), Max: 37.3 (06 Feb 2025 11:45)  T(F): 97.7 (06 Feb 2025 15:48), Max: 99.1 (06 Feb 2025 11:45)  HR: 64 (06 Feb 2025 15:48) (62 - 81)  BP: 174/73 (06 Feb 2025 15:48) (139/57 - 174/73)  BP(mean): --  RR: 18 (06 Feb 2025 15:48) (18 - 18)  SpO2: 95% (06 Feb 2025 15:48) (93% - 98%)    Parameters below as of 06 Feb 2025 15:48  Patient On (Oxygen Delivery Method): room air        PHYSICAL EXAMINATION:  GENERAL: NAD, well built  HEAD:  Atraumatic, Normocephalic  EYES:  conjunctiva and sclera clear  NECK: Supple, No JVD  CHEST/LUNG: Clear to auscultation. No rales, rhonchi, wheezing, or rubs  HEART: Regular rate and rhythm; No murmurs, rubs, or gallops  ABDOMEN: Soft, Nontender, Nondistended; Bowel sounds present  NERVOUS SYSTEM:  Alert & Oriented X2,    EXTREMITIES: No clubbing, cyanosis, or edema  SKIN: warm dry                            14.0   10.04 )-----------( 92       ( 06 Feb 2025 07:50 )             39.9     02-06    143  |  112[H]  |  16  ----------------------------<  143[H]  3.6   |  24  |  0.62    Ca    8.7      06 Feb 2025 07:50  Phos  3.0     02-06  Mg     1.9     02-06    TPro  6.1  /  Alb  2.5[L]  /  TBili  0.9  /  DBili  x   /  AST  21  /  ALT  18  /  AlkPhos  69  02-06    LIVER FUNCTIONS - ( 06 Feb 2025 07:50 )  Alb: 2.5 g/dL / Pro: 6.1 g/dL / ALK PHOS: 69 U/L / ALT: 18 U/L DA / AST: 21 U/L / GGT: x                   CAPILLARY BLOOD GLUCOSE      RADIOLOGY & ADDITIONAL TESTS:

## 2025-02-06 NOTE — PROGRESS NOTE ADULT - PROBLEM SELECTOR PLAN 2
-patient brought due to confusion, weakness by family members after taking benadryl for feeling unwell generally  -in the ED meeting sirs criteria  -in the ED influenza +  -UA negative   -CXR: No acute parenchymal disease  -lactate wnl  -c/w tamiflu  -f/u blood cultures NGTD x24h
patient brought due to confusion, weakness by family members after taking Nyquil for feeling unwell generally   ED met sirs criteria, influenza +  UA, CXR negative, Lactate WNL, BCx neg x 3d  -c/w tamiflu
patient brought due to confusion, weakness by family members after taking Nyquil for feeling unwell generally   ED met sirs criteria, influenza +  UA, CXR negative, Lactate WNL, BCx neg x 3d  -c/w tamiflu last dose 2/6/25
patient brought due to confusion, weakness by family members after taking benadryl for feeling unwell generally   ED met sirs criteria, influenza +  UA, CXR negative, Lactate WNL, BCx neg x 3d  -c/w tamiflu
-patient brought due to confusion, weakness by family members after taking benadryl for feeling unwell generally  -in the ED meeting sirs criteria  -in the ED influenza +  -UA negative   -CXR: No acute parenchymal disease  -lactate wnl  -c/w tamiflu  -f/u blood cultures NGTD x24h

## 2025-02-06 NOTE — PROGRESS NOTE ADULT - PROBLEM SELECTOR PROBLEM 11
CAD (coronary artery disease)
Prophylactic measure
CAD (coronary artery disease)

## 2025-02-06 NOTE — CHART NOTE - NSCHARTNOTEFT_GEN_A_CORE
Patient was seen by physical therapist, recommending shower chair and rolling walker for imbalance and reducing fall risk.

## 2025-02-06 NOTE — PROGRESS NOTE ADULT - PROBLEM SELECTOR PROBLEM 7
Admits to alcohol use
DM (diabetes mellitus)
Admits to alcohol use

## 2025-02-06 NOTE — PROGRESS NOTE ADULT - PROBLEM SELECTOR PROBLEM 10
HLD (hyperlipidemia)
CAD (coronary artery disease)

## 2025-02-06 NOTE — PROGRESS NOTE ADULT - PROBLEM SELECTOR PLAN 8
c/w home meds   dash diet
hx of DM on oral meds   -ISS   -diabetic diet
hx of DM on oral meds   -ISS   -diabetic diet
hx of DM on oraal meds   -ISS   -diabetic diet
hx of DM on oral meds   -ISS   -diabetic diet

## 2025-02-06 NOTE — PROGRESS NOTE ADULT - PROBLEM SELECTOR PLAN 6
noted to have thrombocytopenia to 88 at admission > 76  -unknown baseline   -can be 2/2 alcohol use  -no signs of active bleeding   -monitor platelets  PT 12.8, INR 1.10, APTT 29.7  MADDREY SCORE 4.2
88 at admission > 76 > 79  PT 12.8, INR 1.10, APTT 29.7  unknown baseline most likely 2/2 alcohol use or iso viral infection  no signs of active bleeding   - monitor platelets
88 at admission > 76 > 79 > 85  PT 12.8, INR 1.10, APTT 29.7  unknown baseline most likely 2/2 alcohol use or iso viral infection  no signs of active bleeding   - monitor platelets
88 at admission > 76 > 79 > 85  PT 12.8, INR 1.10, APTT 29.7  unknown baseline most likely 2/2 alcohol use or iso viral infection  no signs of active bleeding   - monitor platelets
As per chart review: Reports he drinks approximately 1 bottle of wine every day and last drink was two days ago.  CIWA: 0 on 2/2  thiamine, folic acid, multivitamin   ativan 1mg prn for symptom trigger  CIWA protocol

## 2025-02-06 NOTE — PROGRESS NOTE ADULT - PROVIDER SPECIALTY LIST ADULT
Cardiology
Internal Medicine
Cardiology
Internal Medicine
Neurology
Cardiology

## 2025-02-07 ENCOUNTER — TRANSCRIPTION ENCOUNTER (OUTPATIENT)
Age: 75
End: 2025-02-07

## 2025-02-07 VITALS
SYSTOLIC BLOOD PRESSURE: 135 MMHG | OXYGEN SATURATION: 95 % | DIASTOLIC BLOOD PRESSURE: 79 MMHG | RESPIRATION RATE: 18 BRPM | TEMPERATURE: 98 F | HEART RATE: 67 BPM

## 2025-02-07 LAB
ALBUMIN SERPL ELPH-MCNC: 2.3 G/DL — LOW (ref 3.5–5)
ALP SERPL-CCNC: 66 U/L — SIGNIFICANT CHANGE UP (ref 40–120)
ALT FLD-CCNC: 19 U/L DA — SIGNIFICANT CHANGE UP (ref 10–60)
ANION GAP SERPL CALC-SCNC: 7 MMOL/L — SIGNIFICANT CHANGE UP (ref 5–17)
AST SERPL-CCNC: 22 U/L — SIGNIFICANT CHANGE UP (ref 10–40)
BILIRUB SERPL-MCNC: 1 MG/DL — SIGNIFICANT CHANGE UP (ref 0.2–1.2)
BUN SERPL-MCNC: 14 MG/DL — SIGNIFICANT CHANGE UP (ref 7–18)
CALCIUM SERPL-MCNC: 8.8 MG/DL — SIGNIFICANT CHANGE UP (ref 8.4–10.5)
CHLORIDE SERPL-SCNC: 110 MMOL/L — HIGH (ref 96–108)
CO2 SERPL-SCNC: 26 MMOL/L — SIGNIFICANT CHANGE UP (ref 22–31)
CREAT SERPL-MCNC: 0.63 MG/DL — SIGNIFICANT CHANGE UP (ref 0.5–1.3)
EGFR: 101 ML/MIN/1.73M2 — SIGNIFICANT CHANGE UP
GLUCOSE BLDC GLUCOMTR-MCNC: 162 MG/DL — HIGH (ref 70–99)
GLUCOSE BLDC GLUCOMTR-MCNC: 167 MG/DL — HIGH (ref 70–99)
GLUCOSE SERPL-MCNC: 173 MG/DL — HIGH (ref 70–99)
HCT VFR BLD CALC: 37.6 % — LOW (ref 39–50)
HGB BLD-MCNC: 13.2 G/DL — SIGNIFICANT CHANGE UP (ref 13–17)
MAGNESIUM SERPL-MCNC: 1.9 MG/DL — SIGNIFICANT CHANGE UP (ref 1.6–2.6)
MCHC RBC-ENTMCNC: 33.6 PG — SIGNIFICANT CHANGE UP (ref 27–34)
MCHC RBC-ENTMCNC: 35.1 G/DL — SIGNIFICANT CHANGE UP (ref 32–36)
MCV RBC AUTO: 95.7 FL — SIGNIFICANT CHANGE UP (ref 80–100)
NRBC # BLD: 0 /100 WBCS — SIGNIFICANT CHANGE UP (ref 0–0)
NRBC BLD-RTO: 0 /100 WBCS — SIGNIFICANT CHANGE UP (ref 0–0)
PHOSPHATE SERPL-MCNC: 2.8 MG/DL — SIGNIFICANT CHANGE UP (ref 2.5–4.5)
PLATELET # BLD AUTO: 96 K/UL — LOW (ref 150–400)
POTASSIUM SERPL-MCNC: 3.2 MMOL/L — LOW (ref 3.5–5.3)
POTASSIUM SERPL-SCNC: 3.2 MMOL/L — LOW (ref 3.5–5.3)
PROT SERPL-MCNC: 6.1 G/DL — SIGNIFICANT CHANGE UP (ref 6–8.3)
RBC # BLD: 3.93 M/UL — LOW (ref 4.2–5.8)
RBC # FLD: 13.1 % — SIGNIFICANT CHANGE UP (ref 10.3–14.5)
SODIUM SERPL-SCNC: 143 MMOL/L — SIGNIFICANT CHANGE UP (ref 135–145)
WBC # BLD: 7.36 K/UL — SIGNIFICANT CHANGE UP (ref 3.8–10.5)
WBC # FLD AUTO: 7.36 K/UL — SIGNIFICANT CHANGE UP (ref 3.8–10.5)

## 2025-02-07 PROCEDURE — 95816 EEG AWAKE AND DROWSY: CPT

## 2025-02-07 PROCEDURE — 83036 HEMOGLOBIN GLYCOSYLATED A1C: CPT

## 2025-02-07 PROCEDURE — 85027 COMPLETE CBC AUTOMATED: CPT

## 2025-02-07 PROCEDURE — 80061 LIPID PANEL: CPT

## 2025-02-07 PROCEDURE — 0225U NFCT DS DNA&RNA 21 SARSCOV2: CPT

## 2025-02-07 PROCEDURE — 70551 MRI BRAIN STEM W/O DYE: CPT | Mod: MC

## 2025-02-07 PROCEDURE — 80048 BASIC METABOLIC PNL TOTAL CA: CPT

## 2025-02-07 PROCEDURE — 87040 BLOOD CULTURE FOR BACTERIA: CPT

## 2025-02-07 PROCEDURE — 76700 US EXAM ABDOM COMPLETE: CPT

## 2025-02-07 PROCEDURE — 97530 THERAPEUTIC ACTIVITIES: CPT

## 2025-02-07 PROCEDURE — 85025 COMPLETE CBC W/AUTO DIFF WBC: CPT

## 2025-02-07 PROCEDURE — 95957 EEG DIGITAL ANALYSIS: CPT

## 2025-02-07 PROCEDURE — 82607 VITAMIN B-12: CPT

## 2025-02-07 PROCEDURE — 84484 ASSAY OF TROPONIN QUANT: CPT

## 2025-02-07 PROCEDURE — 84443 ASSAY THYROID STIM HORMONE: CPT

## 2025-02-07 PROCEDURE — 87389 HIV-1 AG W/HIV-1&-2 AB AG IA: CPT

## 2025-02-07 PROCEDURE — 85730 THROMBOPLASTIN TIME PARTIAL: CPT

## 2025-02-07 PROCEDURE — 0042T: CPT | Mod: MC

## 2025-02-07 PROCEDURE — 81001 URINALYSIS AUTO W/SCOPE: CPT

## 2025-02-07 PROCEDURE — 97116 GAIT TRAINING THERAPY: CPT

## 2025-02-07 PROCEDURE — 96368 THER/DIAG CONCURRENT INF: CPT

## 2025-02-07 PROCEDURE — 82652 VIT D 1 25-DIHYDROXY: CPT

## 2025-02-07 PROCEDURE — 99285 EMERGENCY DEPT VISIT HI MDM: CPT | Mod: 25

## 2025-02-07 PROCEDURE — 82746 ASSAY OF FOLIC ACID SERUM: CPT

## 2025-02-07 PROCEDURE — 93005 ELECTROCARDIOGRAM TRACING: CPT

## 2025-02-07 PROCEDURE — 80053 COMPREHEN METABOLIC PANEL: CPT

## 2025-02-07 PROCEDURE — 87637 SARSCOV2&INF A&B&RSV AMP PRB: CPT

## 2025-02-07 PROCEDURE — 70544 MR ANGIOGRAPHY HEAD W/O DYE: CPT | Mod: MC

## 2025-02-07 PROCEDURE — 70496 CT ANGIOGRAPHY HEAD: CPT | Mod: MC

## 2025-02-07 PROCEDURE — 93320 DOPPLER ECHO COMPLETE: CPT

## 2025-02-07 PROCEDURE — 71045 X-RAY EXAM CHEST 1 VIEW: CPT

## 2025-02-07 PROCEDURE — 85610 PROTHROMBIN TIME: CPT

## 2025-02-07 PROCEDURE — 70450 CT HEAD/BRAIN W/O DYE: CPT | Mod: MC

## 2025-02-07 PROCEDURE — 70498 CT ANGIOGRAPHY NECK: CPT | Mod: MC

## 2025-02-07 PROCEDURE — 36415 COLL VENOUS BLD VENIPUNCTURE: CPT

## 2025-02-07 PROCEDURE — 93312 ECHO TRANSESOPHAGEAL: CPT

## 2025-02-07 PROCEDURE — 96365 THER/PROPH/DIAG IV INF INIT: CPT

## 2025-02-07 PROCEDURE — 93880 EXTRACRANIAL BILAT STUDY: CPT

## 2025-02-07 PROCEDURE — 80076 HEPATIC FUNCTION PANEL: CPT

## 2025-02-07 PROCEDURE — 84100 ASSAY OF PHOSPHORUS: CPT

## 2025-02-07 PROCEDURE — 83605 ASSAY OF LACTIC ACID: CPT

## 2025-02-07 PROCEDURE — C8929: CPT

## 2025-02-07 PROCEDURE — 93325 DOPPLER ECHO COLOR FLOW MAPG: CPT

## 2025-02-07 PROCEDURE — 97162 PT EVAL MOD COMPLEX 30 MIN: CPT

## 2025-02-07 PROCEDURE — 82962 GLUCOSE BLOOD TEST: CPT

## 2025-02-07 PROCEDURE — 99239 HOSP IP/OBS DSCHRG MGMT >30: CPT | Mod: GC

## 2025-02-07 PROCEDURE — 83735 ASSAY OF MAGNESIUM: CPT

## 2025-02-07 RX ORDER — CARVEDILOL 6.25 MG
1 TABLET ORAL
Qty: 60 | Refills: 0
Start: 2025-02-07 | End: 2025-03-08

## 2025-02-07 RX ORDER — ATORVASTATIN CALCIUM 80 MG/1
1 TABLET, FILM COATED ORAL
Qty: 30 | Refills: 0
Start: 2025-02-07 | End: 2025-03-08

## 2025-02-07 RX ORDER — ROSUVASTATIN CALCIUM 10 MG/1
40 TABLET, FILM COATED ORAL
Qty: 0 | Refills: 0 | DISCHARGE

## 2025-02-07 RX ORDER — FOLIC ACID 1 MG
1 TABLET ORAL
Qty: 30 | Refills: 0
Start: 2025-02-07 | End: 2025-03-08

## 2025-02-07 RX ORDER — ENALAPRIL MALEATE 20 MG
1 TABLET ORAL
Qty: 0 | Refills: 0 | DISCHARGE
Start: 2025-02-07

## 2025-02-07 RX ORDER — SERTRALINE HCL 100 MG
1 TABLET ORAL
Qty: 30 | Refills: 0
Start: 2025-02-07 | End: 2025-03-08

## 2025-02-07 RX ORDER — ENALAPRIL MALEATE 20 MG
1 TABLET ORAL
Qty: 60 | Refills: 0
Start: 2025-02-07 | End: 2025-03-08

## 2025-02-07 RX ORDER — POTASSIUM CHLORIDE 750 MG/1
40 TABLET, EXTENDED RELEASE ORAL EVERY 4 HOURS
Refills: 0 | Status: COMPLETED | OUTPATIENT
Start: 2025-02-07 | End: 2025-02-07

## 2025-02-07 RX ORDER — MECOBAL/LEVOMEFOLAT CA/B6 PHOS 2-3-35 MG
1 TABLET ORAL
Qty: 30 | Refills: 0
Start: 2025-02-07 | End: 2025-03-08

## 2025-02-07 RX ORDER — SERTRALINE HCL 100 MG
1 TABLET ORAL
Qty: 0 | Refills: 0 | DISCHARGE
Start: 2025-02-07

## 2025-02-07 RX ADMIN — POTASSIUM CHLORIDE 40 MILLIEQUIVALENT(S): 750 TABLET, EXTENDED RELEASE ORAL at 11:57

## 2025-02-07 RX ADMIN — Medication 20 MILLIGRAM(S): at 05:38

## 2025-02-07 RX ADMIN — Medication 10 MILLIGRAM(S): at 11:57

## 2025-02-07 RX ADMIN — Medication 1: at 11:56

## 2025-02-07 RX ADMIN — ENOXAPARIN SODIUM 40 MILLIGRAM(S): 100 INJECTION SUBCUTANEOUS at 05:38

## 2025-02-07 RX ADMIN — Medication 1 DROP(S): at 13:33

## 2025-02-07 RX ADMIN — POTASSIUM CHLORIDE 40 MILLIEQUIVALENT(S): 750 TABLET, EXTENDED RELEASE ORAL at 15:57

## 2025-02-07 RX ADMIN — Medication 1 MILLIGRAM(S): at 11:55

## 2025-02-07 RX ADMIN — Medication 12.5 MILLIGRAM(S): at 17:08

## 2025-02-07 RX ADMIN — Medication 75 MILLIGRAM(S): at 11:56

## 2025-02-07 RX ADMIN — Medication 205 MILLIGRAM(S): at 12:32

## 2025-02-07 RX ADMIN — Medication 1 TABLET(S): at 11:55

## 2025-02-07 RX ADMIN — Medication 1 DROP(S): at 05:38

## 2025-02-07 RX ADMIN — Medication 12.5 MILLIGRAM(S): at 05:38

## 2025-02-07 RX ADMIN — Medication 25 MILLIGRAM(S): at 11:57

## 2025-02-07 RX ADMIN — Medication 1: at 08:17

## 2025-02-07 RX ADMIN — ASPIRIN 81 MILLIGRAM(S): 81 TABLET, COATED ORAL at 11:56

## 2025-02-07 RX ADMIN — Medication 20 MILLIGRAM(S): at 17:07

## 2025-02-07 NOTE — DISCHARGE NOTE NURSING/CASE MANAGEMENT/SOCIAL WORK - NSDCPEFALRISK_GEN_ALL_CORE
For information on Fall & Injury Prevention, visit: [email or web address] OR  [email or web address] OR  https://www.cdc.gov/steadi/patient.html

## 2025-02-07 NOTE — DISCHARGE NOTE NURSING/CASE MANAGEMENT/SOCIAL WORK - NSDCFUADDAPPT_GEN_ALL_CORE_FT
APPTS ARE READY TO BE MADE: [X] YES    Best Family or Patient Contact (if needed):    Additional Information about above appointments (if needed):    1: Neurology (Dr. Callahan)  2: PCP  3:     Other comments or requests:

## 2025-02-07 NOTE — DISCHARGE NOTE PROVIDER - NSDCQMSTROKERISK_NEU_ALL_CORE
Carotid stenosis/Diabetes/High blood pressure/History of a stroke or TIA Blood clotting problems/Carotid stenosis/Diabetes/High blood pressure/High cholesterol/History of a stroke or TIA

## 2025-02-07 NOTE — DISCHARGE NOTE PROVIDER - ATTENDING DISCHARGE PHYSICAL EXAMINATION:
Patient admitted with weakness and confusion noted to be positive for Influenza as well as Acute CVA on MRI  Patient clinically better with fluids, Tamiflu and ABX fo pneumonia  Patient recommended for KIM due to gait imbalance refused and agreed for Home PT; good family support    P/E:  elderly frail male, physically deconditioned   ait: somewhat ataxic but improved   Psych: AAO x3  Neuro: No gross focal deficits; Power and sensation intact  CVS: S1S2 present, regular, no edema  Resp: BLAE+, No wheeze or Rhonchi  GI: Soft, BS+, Non tender, non distended  Extr: No  calf tenderness B/L Lower extremities    Plan:  D/C Home with Home PT  Discussed with Son at bedside  Needs to f/u with EP Dr. Meneses as scheduled for ILR to evaluate A. Fib given recent stroke and multiple prior stroke  See discharge instructions   Discussed with Housestaff/ Cardio/ Neuro and RN/

## 2025-02-07 NOTE — DISCHARGE NOTE PROVIDER - NSDCFUSCHEDAPPT_GEN_ALL_CORE_FT
Gideon Goldstein  [facility]  [phone]  Scheduled Appointment: 02/19/2025

## 2025-02-07 NOTE — DISCHARGE NOTE PROVIDER - NSDCCPCAREPLAN_GEN_ALL_CORE_FT
PRINCIPAL DISCHARGE DIAGNOSIS  Diagnosis: CVA (cerebrovascular accident)  Assessment and Plan of Treatment: You came complaining of weakness and confusion. You were diagnosed with CVA cerebro vascular disease.   CT head showed bihemispheric areas of increased Tmax most pronounced along the region of the right brachium pontis and right cerebellum. Neurology was consulted and   MRI brain showed demonstrated Left posterior hippocampal formation acute infarct, with small cerebral hemispheric white matter basal ganglia thalamic and brainstem remote infarcts. EEG was negative for any seizure like activity.   US doppler of bilateral carotids showed prominent calcified atheromatous plaque at the bifurcations suspicious for stenosis of the right internal carotid artery.  Echocardiogram showed EF77%, increased  mass and concentric hypertrophy, normal pumping function of the heart and no significant valvular abnormalities.   You were seen by Neurologist who recommends treatment with ASPIRIN, ATORVASTATIN AND PLAVIX INDEFINITELY. You were seen by physical therapist who recommended HOME CARE SERVICES.  Please take medications as prescribed and follow up with your PCP and Neurologist in 1 week from discharge for further recommendations. Please also follow up with DR. VANG ON 2/19/25 10:15AM FOR PLACEMENT OF THE LOOP RECORDER TO MONITOR FOR ANY ARRHYTHMIAS.        SECONDARY DISCHARGE DIAGNOSES  Diagnosis: Type A influenza  Assessment and Plan of Treatment: You presented with influenza and completed a course of oseltamivir (Tamiflu).  Continue supportive care, including adequate hydration and rest. Please follow up with your primary care physician in one week to inform them of your recent hospitalization and further management of your medical conditions.    Diagnosis: Carotid artery stenosis  Assessment and Plan of Treatment: You have some narrowing in your carotid artery, which is a blood vessel that supplies blood to your brain. Vascular surgery was consulted and recommended no acute intervention especially in the setting of your stroke. Please continue taking your aspirin, Plavix, and statin medication to help prevent further narrowing and reduce the risk of further strokes. Please follow up with your primary care physician in one week to inform them of your recent hospitalization and further management of your medical conditions.    Diagnosis: Thrombocytopenia  Assessment and Plan of Treatment: You were diagnosed with thrombocytopenia which means that you have low platelets that may have been caused by a viral infection concurrent with your chronic alcohol use. Many patients with low platelets are asymptomatic and require no treatment but some patients may experience symptoms such as spontaneous bleeding from the eyes, gums and bladder. You had no signs of bleeding during the hospital stay so you were monitored and required no interventions to address your low platelet count. Please follow up with your PCP in a week from discharge.    Diagnosis: HTN (hypertension)  Assessment and Plan of Treatment: You have a history of Hypertension.   On this admission, your Blood Pressure was adequately controlled with ENALAPRIL 20MG TWICE DAILY AND COREG 12.6MG TWICE DAILY.  Your blood pressure target is 120-140/80-90, maintain healthy lifestyle, low salt diet, avoid fatty food, weight loss, exercise regularly or stay active as tolerated 30 mins X 3 times per week.  Notify your doctor if you have any of the following symptoms:   (Dizziness, Lightheadedness, Blurry vision, Headache, Chest pain, Shortness of breath.)  Please continue taking ENALAPRIL 20MG TWICE DAILY AND COREG 12.6MG TWICE DAILY AND DISCONTINUE ATENOLOL and follow-up with your PCP in 1 week from discharge to adjust medications as needed.    Diagnosis: DM (diabetes mellitus)  Assessment and Plan of Treatment: You have a history of diabetes.   You need to continue monitoring your blood sugar levels closely and maintain healthy lifestyle by eating healthy diabetic regimen, weight loss and exercise regularly as tolerated.  Please continue to take your medications as prescribed.   Please follow up with your PCP/Endocrinologist within a week of discharge.    Diagnosis: HLD (hyperlipidemia)  Assessment and Plan of Treatment: You have history of Hyperlipidemia. On this admission you were found to have abnormal high lipid profile.  Please take ATORVASTATIN 80MG ONCE DAILY AT BEDTIME  as prescribed. Maintain healthy lifestyle, low fat diet, exercise regularly and check your lipid levels routinely.   Please follow up with your PCP in 1 week from discharge.    Diagnosis: CAD (coronary artery disease)  Assessment and Plan of Treatment: You have history of coronary artery disease and peripheral artery disease which is a narrowing of the arteries of your heart caused by a buildup of plaque made of cholesterol. The narrowing decreased the amount of blood flow to your heart causing chest pain, shortness of breath, sweating.   You are taking ASA, Cilostazol, Plavix as home medications.  Please continue to take your medications as prescribed.  Please follow with your PCP/Cardiologist in a week.     PRINCIPAL DISCHARGE DIAGNOSIS  Diagnosis: CVA (cerebrovascular accident)  Assessment and Plan of Treatment: You came complaining of weakness and confusion. You were noted to have acute stroke (CVA cerebro vascular disease).   CT head showed bihemispheric areas of increased Tmax most pronounced along the region of the right brachium pontis and right cerebellum. Neurology was consulted and recommended MRI.   MRI brain showed demonstrated Left posterior hippocampal formation acute infarct, with small cerebral hemispheric white matter basal ganglia thalamic and brainstem remote infarcts. EEG was negative for any seizure like activity.   US doppler of bilateral carotids showed prominent calcified atheromatous plaque at the bifurcations suspicious for stenosis of the right internal carotid artery.  Echocardiogram showed EF77%, increased  mass and concentric hypertrophy, normal pumping function of the heart and no significant valvular abnormalities.   You were seen by Neurologist who recommends treatment with ASPIRIN, ATORVASTATIN AND PLAVIX INDEFINITELY. You were seen by physical therapist who recommended HOME CARE SERVICES.  Please take medications as prescribed and follow up with your PCP and Neurologist in 1 week from discharge for further recommendations. Please also follow up with DR. VANG ON 2/19/25 10:15AM FOR PLACEMENT OF THE LOOP RECORDER TO MONITOR FOR ANY ARRHYTHMIAS.        SECONDARY DISCHARGE DIAGNOSES  Diagnosis: Type A influenza  Assessment and Plan of Treatment: You presented with influenza and completed a course of oseltamivir (Tamiflu).  Continue supportive care, including adequate hydration and rest. Please follow up with your primary care physician in one week to inform them of your recent hospitalization and further management of your medical conditions.    Diagnosis: Carotid artery stenosis  Assessment and Plan of Treatment: You have some narrowing in your carotid artery, which is a blood vessel that supplies blood to your brain. Vascular surgery was consulted and recommended no acute intervention especially in the setting of your stroke. Please continue taking your aspirin, Plavix, and statin medication to help prevent further narrowing and reduce the risk of further strokes. Please follow up with your primary care physician in one week to inform them of your recent hospitalization and further management of your medical conditions.    Diagnosis: Thrombocytopenia  Assessment and Plan of Treatment: You were diagnosed with thrombocytopenia which means that you have low platelets that may have been caused by a viral infection concurrent with your chronic alcohol use. Many patients with low platelets are asymptomatic and require no treatment but some patients may experience symptoms such as spontaneous bleeding from the eyes, gums and bladder. You had no signs of bleeding during the hospital stay so you were monitored and required no interventions to address your low platelet count. Please follow up with your PCP in a week from discharge.    Diagnosis: DM (diabetes mellitus)  Assessment and Plan of Treatment: You have a history of diabetes.   You need to continue monitoring your blood sugar levels closely and maintain healthy lifestyle by eating healthy diabetic regimen, weight loss and exercise regularly as tolerated.  Please continue to take your medications as prescribed.   Please follow up with your PCP/Endocrinologist within a week of discharge.    Diagnosis: HTN (hypertension)  Assessment and Plan of Treatment: You have a history of Hypertension.   On this admission, your Blood Pressure was adequately controlled with ENALAPRIL 20MG TWICE DAILY AND COREG 12.6MG TWICE DAILY.  Your blood pressure target is 120-140/80-90, maintain healthy lifestyle, low salt diet, avoid fatty food, weight loss, exercise regularly or stay active as tolerated 30 mins X 3 times per week.  Notify your doctor if you have any of the following symptoms:   (Dizziness, Lightheadedness, Blurry vision, Headache, Chest pain, Shortness of breath.)  Please continue taking ENALAPRIL 20MG TWICE DAILY AND COREG 12.6MG TWICE DAILY AND DISCONTINUE ATENOLOL and follow-up with your PCP in 1 week from discharge to adjust medications as needed.    Diagnosis: HLD (hyperlipidemia)  Assessment and Plan of Treatment: You have history of Hyperlipidemia. On this admission you were found to have abnormal high lipid profile.  Please take ATORVASTATIN 80MG ONCE DAILY AT BEDTIME  as prescribed. Maintain healthy lifestyle, low fat diet, exercise regularly and check your lipid levels routinely.   Please follow up with your PCP in 1 week from discharge.    Diagnosis: CAD (coronary artery disease)  Assessment and Plan of Treatment: You have history of coronary artery disease and peripheral artery disease which is a narrowing of the arteries of your heart caused by a buildup of plaque made of cholesterol. The narrowing decreased the amount of blood flow to your heart causing chest pain, shortness of breath, sweating.   You are taking ASA, Cilostazol, Plavix as home medications.  Please continue to take your medications as prescribed.  Please follow with your PCP/Cardiologist in a week.     PRINCIPAL DISCHARGE DIAGNOSIS  Diagnosis: CVA (cerebrovascular accident)  Assessment and Plan of Treatment: You came complaining of weakness and confusion. You were noted to have acute stroke (CVA cerebro vascular disease).   CT head showed bihemispheric areas of increased Tmax most pronounced along the region of the right brachium pontis and right cerebellum. Neurology was consulted and recommended MRI.   MRI brain showed demonstrated Left posterior hippocampal formation acute infarct, with small cerebral hemispheric white matter basal ganglia thalamic and brainstem remote infarcts. EEG was negative for any seizure like activity.   US doppler of bilateral carotids showed prominent calcified atheromatous plaque at the bifurcations suspicious for stenosis of the right internal carotid artery.  Echocardiogram showed EF 77%, increased  mass and concentric hypertrophy, normal pumping function of the heart and no significant valvular abnormalities.   You were seen by Neurologist who recommends treatment with ASPIRIN, ATORVASTATIN AND PLAVIX INDEFINITELY. You were seen by physical therapist who recommended HOME CARE SERVICES WITH HOME PHYSICAL THERAPY AND VISITING NURSE.   Please take medications as prescribed and follow up with your PCP and Neurologist in 1 week from discharge for further recommendations. Please also follow up with DR. VANG ON 2/19/25 10:15AM FOR PLACEMENT OF THE LOOP RECORDER TO MONITOR FOR ANY ARRHYTHMIAS.  PLEASE NOTE THAT IF YOU ARE FOUND TO HAVE ATRIAL FIBRILLATION, YOU MAY NEED A BLOOD THINNER. AT HAT P[OINT YOUR PCP OR CARDIOLOGIST MAY NEED TO STOP EITHER ASPIRIN PREFERABLY AND CONTINUE PLAVIX AS YOU ALSO HAS PERIPHERAL ARTERIAL DISEASE AND STENT IN PLACE        SECONDARY DISCHARGE DIAGNOSES  Diagnosis: Type A influenza  Assessment and Plan of Treatment: You presented with influenza and completed a course of oseltamivir (Tamiflu). You had acute encephalopathy likely contribuited by underlying Flu.  Continue supportive care, including adequate hydration and rest. Please follow up with your primary care physician in one week to inform them of your recent hospitalization and further management of your medical conditions.    Diagnosis: Carotid artery stenosis  Assessment and Plan of Treatment: You have some narrowing in your carotid artery, which is a blood vessel that supplies blood to your brain. Vascular surgery was consulted and recommended no acute intervention especially in the setting of your stroke. Please continue taking your aspirin, Plavix, and statin medication to help prevent further narrowing and reduce the risk of further strokes. Please follow up with your primary care physician in one week to inform them of your recent hospitalization and further management of your medical conditions.  PLEASE FOLLOW UP [facility] SURGERY DR. POLICAHA AS ADVISED IN FEW WEEKS AFTER RECOVERY FORM ACUTE STROKE TO SEE IF NEED ANY SURGICAL INTERVENTION.    Diagnosis: Thrombocytopenia  Assessment and Plan of Treatment: You were diagnosed with thrombocytopenia which means that you have low platelets that may have been caused by a viral infection concurrent with your chronic alcohol use. Many patients with low platelets are asymptomatic and require no treatment but some patients may experience symptoms such as spontaneous bleeding from the eyes, gums and bladder. You had no signs of bleeding during the hospital stay so you were monitored and required no interventions to address your low platelet count. Please follow up with your PCP in a week from discharge.    Diagnosis: Acute encephalopathy  Assessment and Plan of Treatment: You had significant alteration in mentation on admission likely due to a combination of acute stroke, dehydration, some withdrawal symptoms from chronic alcohol use. You was given IV Fluyids, Multivitamin and Thiamine and Folic acid with improvement. You also was noted to be delirious during the course for of hospitalization for a periof of 1-2 days which has resolved and now you are at basleine mentation.    Diagnosis: DM (diabetes mellitus)  Assessment and Plan of Treatment: You have a history of diabetes.   You need to continue monitoring your blood sugar levels closely and maintain healthy lifestyle by eating healthy diabetic regimen, weight loss and exercise regularly as tolerated.  Please continue to take your medications as prescribed.   Please follow up with your PCP/Endocrinologist within a week of discharge.    Diagnosis: HTN (hypertension)  Assessment and Plan of Treatment: You have a history of Hypertension.   On this admission, your Blood Pressure was adequately controlled with ENALAPRIL 20MG TWICE DAILY AND COREG 12.6MG TWICE DAILY.  Your blood pressure target is 120-140/80-90, maintain healthy lifestyle, low salt diet, avoid fatty food, weight loss, exercise regularly or stay active as tolerated 30 mins X 3 times per week.  Notify your doctor if you have any of the following symptoms:   (Dizziness, Lightheadedness, Blurry vision, Headache, Chest pain, Shortness of breath.)  Please continue taking ENALAPRIL 20MG TWICE DAILY AND COREG 12.6MG TWICE DAILY AND DISCONTINUE ATENOLOL and follow-up with your PCP in 1 week from discharge to adjust medications as needed.    Diagnosis: HLD (hyperlipidemia)  Assessment and Plan of Treatment: You have history of Hyperlipidemia. On this admission you were found to have abnormal high lipid profile.  Please take ROSUVASTATIN 40 MG ONCE DAILY AT BEDTIME  as prescribed HIGH INTENSITY STATIN FOR STROKE. Maintain healthy lifestyle, low fat diet, exercise regularly and check your lipid levels routinely.   Please follow up with your PCP in 1 week from discharge.    Diagnosis: CAD (coronary artery disease)  Assessment and Plan of Treatment: You have history of coronary artery disease and peripheral artery disease which is a narrowing of the arteries of your heart caused by a buildup of plaque made of cholesterol. The narrowing decreased the amount of blood flow to your heart causing chest pain, shortness of breath, sweating.   You are taking ASA, Cilostazol, Plavix as home medications.  Please continue to take your medications as prescribed.  Please follow with your PCP/Cardiologist in a week.

## 2025-02-07 NOTE — DISCHARGE NOTE PROVIDER - PROVIDER TOKENS
PROVIDER:[TOKEN:[[id number]]],PROVIDER:[TOKEN:[[id number]]],PROVIDER:[TOKEN:[[id number]],SCHEDULEDAPPT:[02/19/2025],SCHEDULEDAPPTTIME:[10:15 AM]] PROVIDER:[TOKEN:[[id number]]],PROVIDER:[TOKEN:[[id number]],SCHEDULEDAPPT:[02/19/2025],SCHEDULEDAPPTTIME:[10:15 AM]],PROVIDER:[TOKEN:[[id number]]]

## 2025-02-07 NOTE — DISCHARGE NOTE PROVIDER - HOSPITAL COURSE
75 yo M with PMHx of CVA, PAD, CAD, DM, HTN that was brought in for weakness. Patient was admitted for acute encephalopathy due to infection vs recent stroke.   Patient brought in for confusion, weakness by family members after self-medicating with Nyquil for feeling unwell generally baseline according to family AAOx2 but forgetful, last stroke was noted to be 1-2 years ago after obtaining more collateral from son. In the ED patient met SIRS criteria and was found to be Influenza+. Patient was started and completed a course of Tamiflu. UA and CXR negative, Lactate WNL, BCx neg x 3days. CT Head was also obtained and demonstrated bihemispheric areas of increased Tmax most pronounced along the region of the right brachium pontis and right cerebellum. No decreased CBF was CBV appreciated. Neurology was consulted and patient was continued on DAPT , and statin inpatient. MR Head and MR Angiogram demonstrated Left posterior hippocampal formation acute infarct, small cerebral hemispheric white matter basal ganglia thalamic and brainstem remote infarcts. NIHSS at bedside was 0, EEG negative. TTE:  EF77%, increased LV mass and concentric hypertrophy, mild (grade 1) left ventricular diastolic dysfunction, mild LVH, LA mildly dilated, no PFO and JUANA showed no cardiac source of embolism was identified with EF 55-60%. Patient is to continue on ASA, Plavix indefinitely (given his h/o PAD) and follow up with Dr. Goldstein on 2/19/2024 @ 10:15a ([place]) for outpatient ILR placement. PT recommended KIM but patient and family declined.   Patient also has a history of alcohol use, approximately 1 bottle of wine every day and last drink was two days ago and was started on Folate, Thiamine and Multivitamin for Wernicke's Encephalopathy prevention. ABD US: Nodular liver, cholelithiasis. Patient should refrain from alcohol use as it increases his risk for cardiac disease.   Patient's hospital course was also complicated by Thrombocytopenia most likely 2/2 to EtOH abuse or iso viral infection and was continuously monitored.  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 Vascular was consulted and recommended no acute intervention.  Patient has a h/o DM and continued inpatient on ISS and resume home medications on discharge.  Patient has a h/o HTN on atenolol at home, was discontinued, patient will go home on 12.5mg BID, ENALAPRIL 20MG QD  Patient has a h/o HLD on rosuvastatin 40mg, and can continue outpatient.  Patient has a h/o CAD continued on home meds and should continue, ASA, PLAVIX, AND STATIN OUTPATIENT  Patient stable for discharge and should follow up with PCP.   75 yo M with PMHx of CVA, PAD, CAD, DM, HTN that was brought in for weakness. Patient was admitted for acute encephalopathy due to infection vs recent stroke.   Patient brought in for confusion, weakness by family members after self-medicating with Nyquil for feeling unwell generally baseline according to family AAOx2 but forgetful, last stroke was noted to be 1-2 years ago after obtaining more collateral from son. In the ED patient met SIRS criteria and was found to be Influenza+. Patient was started and completed a course of Tamiflu. UA and CXR negative, Lactate WNL, BCx neg x 3days. CT Head was also obtained and demonstrated bihemispheric areas of increased Tmax most pronounced along the region of the right brachium pontis and right cerebellum. No decreased CBF was CBV appreciated. Neurology was consulted and patient was continued on DAPT , and statin inpatient. MR Head and MR Angiogram demonstrated Left posterior hippocampal formation acute infarct, small cerebral hemispheric white matter basal ganglia thalamic and brainstem remote infarcts. NIHSS at bedside was 0, EEG negative. TTE:  EF77%, increased LV mass and concentric hypertrophy, mild (grade 1) left ventricular diastolic dysfunction, mild LVH, LA mildly dilated, no PFO and JUAAN showed no cardiac source of embolism was identified with EF 55-60%. Patient is to continue on ASA, Plavix indefinitely (given his h/o PAD) and follow up with Dr. Goldstein on 2/19/2024 @ 10:15a ([place]) for outpatient ILR placement. PT recommended KIM but patient and family declined.   Patient also has a history of alcohol use, approximately 1 bottle of wine every day and last drink was two days ago and was started on Folate, Thiamine and Multivitamin for Wernicke's Encephalopathy prevention. ABD US: Nodular liver, cholelithiasis. Patient should refrain from alcohol use as it increases his risk for cardiac disease.   Patient's hospital course was also complicated by Thrombocytopenia most likely 2/2 to EtOH abuse or iso viral infection and was continuously monitored.  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 Vascular was consulted and recommended no acute intervention.  Patient has a h/o DM and continued inpatient on ISS and resume home medications on discharge.  Patient has a h/o HTN on atenolol at home, was discontinued, patient will go home on 12.5mg BID, ENALAPRIL 20MG QD  Patient has a h/o HLD on rosuvastatin 40mg, and can continue outpatient.  Patient has a h/o CAD continued on home meds and should continue, ASA, PLAVIX, AND STATIN OUTPATIENT  Patient stable for discharge and should follow up with PCP.

## 2025-02-07 NOTE — DISCHARGE NOTE PROVIDER - NSDCQMANTICOAGCONTRAOTHER_GEN_A_CORE_FT
Patient is going home on DAPT and will have outpatient an ILR placed to monitor for arrhythmias upon which need for anticoagulation will be assessed

## 2025-02-07 NOTE — DISCHARGE NOTE PROVIDER - CARE PROVIDER_API CALL
Cici Callahan  Neurology  [address], [address]  [place] [address]  Phone: [phone]  Fax: [phone]  Follow Up Time:     Jun Latif  Internal Medicine  [address], [address]  [place] [address]  Phone: [phone]  Fax: [phone]  Follow Up Time:     Gideon Goldstein  Cardiac Electrophysiology  [address] [place] [address], [address]  [place] [address]  Phone: [phone]  Fax: [phone]  Scheduled Appointment: 02/19/2025 10:15 AM   Cici Callahan  Neurology  [address], [address]  [place] [address]  Phone: [phone]  Fax: [phone]  Follow Up Time:     Gideon Goldstein  Cardiac Electrophysiology  [id number] [place] [address], [address]  [place] [address]  Phone: [phone]  Fax: [phone]  Scheduled Appointment: 02/19/2025 10:15 AM    Marium Gurrola  Internal Medicine  [address] [place] [address]  Phone: [phone]  Fax: [phone]  Follow Up Time:

## 2025-02-07 NOTE — DISCHARGE NOTE NURSING/CASE MANAGEMENT/SOCIAL WORK - FINANCIAL ASSISTANCE
[facility] provides services at a reduced cost to those who are determined to be eligible through [facility]’s financial assistance program. Information regarding [facility]’s financial assistance program can be found by going to [email or web address] or by calling [phone].

## 2025-02-07 NOTE — DISCHARGE NOTE PROVIDER - NSDCCAREPROVSEEN_GEN_ALL_CORE_FT
Mandie, Rimma Miles, Ruth Callahan, Cici Goldstein, Gideon Marlow, Graham Vargas, Josselyn Montiel, Ronel Hayes

## 2025-02-07 NOTE — DISCHARGE NOTE PROVIDER - NSDCMRMEDTOKEN_GEN_ALL_CORE_FT
aspirin 81 mg oral delayed release tablet: 1 tab(s) orally once a day  atenolol 50 mg oral tablet: 1 tablet with sensor orally once a day  cilostazol 100 mg oral tablet: 1 tab(s) orally 2 times a day  enalapril 10 mg oral tablet: 1 tab(s) orally once a day  ergocalciferol 1.25 mg (50,000 intl units) oral capsule: 1 cap(s) orally every 28 days 3 tablets only  metFORMIN 500 mg oral tablet: 1 tab(s) orally 3 times a day (with meals)  Plavix 75 mg oral tablet: 1 tab(s) orally once a day  rosuvastatin 40 mg oral tablet: 1 tablet with sensor orally once a day  tamsulosin 0.4 mg oral capsule: 2 cap(s) orally once a day (at bedtime)  Zetia 10 mg oral tablet: 1 tab(s) orally once a day   aspirin 81 mg oral delayed release tablet: 1 tab(s) orally once a day  cilostazol 100 mg oral tablet: 1 tab(s) orally 2 times a day  enalapril 20 mg oral tablet: 1 tab(s) orally 2 times a day  ergocalciferol 1.25 mg (50,000 intl units) oral capsule: 1 cap(s) orally every 28 days 3 tablets only  metFORMIN 500 mg oral tablet: 1 tab(s) orally 3 times a day (with meals)  Plavix 75 mg oral tablet: 1 tab(s) orally once a day  sertraline 25 mg oral tablet: 1 tab(s) orally once a day  tamsulosin 0.4 mg oral capsule: 2 cap(s) orally once a day (at bedtime)  Zetia 10 mg oral tablet: 1 tab(s) orally once a day   aspirin 81 mg oral delayed release tablet: 1 tab(s) orally once a day  atorvastatin 80 mg oral tablet: 1 tab(s) orally once a day (at bedtime)  carvedilol 12.5 mg oral tablet: 1 tab(s) orally every 12 hours  cilostazol 100 mg oral tablet: 1 tab(s) orally 2 times a day  enalapril 20 mg oral tablet: 1 tab(s) orally 2 times a day  ergocalciferol 1.25 mg (50,000 intl units) oral capsule: 1 cap(s) orally every 28 days 3 tablets only  folic acid 1 mg oral tablet: 1 tab(s) orally once a day  metFORMIN 500 mg oral tablet: 1 tab(s) orally 3 times a day (with meals)  Multiple Vitamins oral tablet: 1 tab(s) orally once a day  Plavix 75 mg oral tablet: 1 tab(s) orally once a day  sertraline 25 mg oral tablet: 1 tab(s) orally once a day  tamsulosin 0.4 mg oral capsule: 2 cap(s) orally once a day (at bedtime)  Zetia 10 mg oral tablet: 1 tab(s) orally once a day   aspirin 81 mg oral delayed release tablet: 1 tab(s) orally once a day  carvedilol 12.5 mg oral tablet: 1 tab(s) orally every 12 hours  cilostazol 100 mg oral tablet: 1 tab(s) orally 2 times a day  enalapril 20 mg oral tablet: 1 tab(s) orally 2 times a day  ergocalciferol 1.25 mg (50,000 intl units) oral capsule: 1 cap(s) orally every 2 weeks 3 tablets only  folic acid 1 mg oral tablet: 1 tab(s) orally once a day  metFORMIN 500 mg oral tablet: 1 tab(s) orally 3 times a day (with meals)  Multiple Vitamins oral tablet: 1 tab(s) orally once a day  Plavix 75 mg oral tablet: 1 tab(s) orally once a day  rosuvastatin: 40 milligram(s) orally once a day (at bedtime)  sertraline 25 mg oral tablet: 1 tab(s) orally once a day  tamsulosin 0.4 mg oral capsule: 2 cap(s) orally once a day (at bedtime)  Zetia 10 mg oral tablet: 1 tab(s) orally once a day

## 2025-02-07 NOTE — DISCHARGE NOTE PROVIDER - CARE PROVIDERS DIRECT ADDRESSES
,[email or web address],DirectAddress_Unknown,DirectAddress_Unknown ,[email or web address],DirectAddress_Unknown,[email or web address]

## 2025-02-07 NOTE — DISCHARGE NOTE PROVIDER - NSDCFUADDAPPT_GEN_ALL_CORE_FT
APPTS ARE READY TO BE MADE: [X] YES    Best Family or Patient Contact (if needed):    Additional Information about above appointments (if needed):    1: Neurology (Dr. Callahan)  2: PCP  3:     Other comments or requests:   APPTS ARE READY TO BE MADE: [X] YES    Best Family or Patient Contact (if needed):    Additional Information about above appointments (if needed):    1: Neurology (Dr. Callahan)  2: PCP  3:     Other comments or requests:    Prior to outreaching the patient, it was visible that the patient has secured a follow up appointment for Electrophysiology with Dr. Goldstein on 2/19/2025 at 10:15am, [address] [place] [address], which was not scheduled by our team.    Patient was outreached but did not answer nor could a voicemail be left. NEURO, PCP. [phone].     Patient was outreached but did not answer. A voicemail was left for the patient to return our call. [phone].

## 2025-02-07 NOTE — DISCHARGE NOTE NURSING/CASE MANAGEMENT/SOCIAL WORK - PATIENT PORTAL LINK FT
You can access the FollowMyHealth Patient Portal offered by [facility] by registering at the following website: [email or web address]. By joining [facility]’s FollowMyHealth portal, you will also be able to view your health information using other applications (apps) compatible with our system.

## 2025-02-14 NOTE — CHART NOTE - NSCHARTNOTESELECT_GEN_ALL_CORE
BP/Event Note
CIWA/Event Note
D/C Appointments/Event Note
Family Update & Collateral/Event Note
Follow up/Event Note
Collateral information/Event Note
Event Note
chair/walker/Event Note

## 2025-02-19 ENCOUNTER — NON-APPOINTMENT (OUTPATIENT)
Age: 75
End: 2025-02-19

## 2025-02-19 ENCOUNTER — APPOINTMENT (OUTPATIENT)
Dept: ELECTROPHYSIOLOGY | Facility: CLINIC | Age: 75
End: 2025-02-19
Payer: MEDICARE

## 2025-02-19 VITALS
HEART RATE: 69 BPM | WEIGHT: 159 LBS | BODY MASS INDEX: 22.76 KG/M2 | DIASTOLIC BLOOD PRESSURE: 68 MMHG | HEIGHT: 70 IN | OXYGEN SATURATION: 96 % | SYSTOLIC BLOOD PRESSURE: 116 MMHG

## 2025-02-19 DIAGNOSIS — Z86.39 PERSONAL HISTORY OF OTHER ENDOCRINE, NUTRITIONAL AND METABOLIC DISEASE: ICD-10-CM

## 2025-02-19 DIAGNOSIS — Z95.1 PRESENCE OF AORTOCORONARY BYPASS GRAFT: ICD-10-CM

## 2025-02-19 DIAGNOSIS — Z86.73 PERSONAL HISTORY OF TRANSIENT ISCHEMIC ATTACK (TIA), AND CEREBRAL INFARCTION W/OUT RESIDUAL DEFICITS: ICD-10-CM

## 2025-02-19 DIAGNOSIS — I25.10 ATHEROSCLEROTIC HEART DISEASE OF NATIVE CORONARY ARTERY W/OUT ANGINA PECTORIS: ICD-10-CM

## 2025-02-19 DIAGNOSIS — I10 ESSENTIAL (PRIMARY) HYPERTENSION: ICD-10-CM

## 2025-02-19 PROBLEM — Z00.00 ENCOUNTER FOR PREVENTIVE HEALTH EXAMINATION: Status: ACTIVE | Noted: 2025-02-19

## 2025-02-19 PROCEDURE — 99214 OFFICE O/P EST MOD 30 MIN: CPT | Mod: 25

## 2025-02-19 PROCEDURE — 93000 ELECTROCARDIOGRAM COMPLETE: CPT

## 2025-02-19 RX ORDER — CLOPIDOGREL 75 MG/1
75 TABLET, FILM COATED ORAL DAILY
Qty: 30 | Refills: 3 | Status: ACTIVE | COMMUNITY
Start: 2025-02-19

## 2025-02-19 RX ORDER — ENALAPRIL MALEATE 20 MG/1
20 TABLET ORAL DAILY
Qty: 90 | Refills: 3 | Status: ACTIVE | COMMUNITY
Start: 2025-02-19

## 2025-02-19 RX ORDER — ASPIRIN 81 MG/1
81 TABLET, COATED ORAL
Qty: 90 | Refills: 3 | Status: ACTIVE | COMMUNITY
Start: 2025-02-19

## 2025-02-19 RX ORDER — CILOSTAZOL 100 MG/1
100 TABLET ORAL TWICE DAILY
Refills: 0 | Status: ACTIVE | COMMUNITY
Start: 2025-02-19

## 2025-02-19 RX ORDER — CARVEDILOL 12.5 MG/1
12.5 TABLET, FILM COATED ORAL TWICE DAILY
Qty: 60 | Refills: 3 | Status: ACTIVE | COMMUNITY
Start: 2025-02-19

## 2025-02-19 RX ORDER — SERTRALINE 25 MG/1
25 TABLET, FILM COATED ORAL DAILY
Refills: 0 | Status: ACTIVE | COMMUNITY
Start: 2025-02-19

## 2025-02-19 RX ORDER — METFORMIN HYDROCHLORIDE 500 MG/1
500 TABLET, COATED ORAL
Qty: 60 | Refills: 3 | Status: ACTIVE | COMMUNITY
Start: 2025-02-19

## 2025-02-19 RX ORDER — ERGOCALCIFEROL 1.25 MG/1
1.25 MG CAPSULE ORAL
Qty: 4 | Refills: 3 | Status: ACTIVE | COMMUNITY
Start: 2025-02-19

## 2025-02-19 RX ORDER — EZETIMIBE 10 MG/1
10 TABLET ORAL
Qty: 90 | Refills: 0 | Status: ACTIVE | COMMUNITY
Start: 2025-02-19

## 2025-02-19 RX ORDER — ROSUVASTATIN CALCIUM 40 MG/1
40 TABLET, FILM COATED ORAL DAILY
Qty: 90 | Refills: 1 | Status: ACTIVE | COMMUNITY
Start: 2025-02-19

## 2025-03-05 NOTE — H&P PST ADULT - REASON FOR ADMISSION
Please follow-up with the ENT for further evaluation    Please use the nasal spray and antibiotic drops for the next week    Please return to the ER if you develop any fever worsening pain and swelling to the outside of the ear.   " surgery for right hand "

## 2025-03-17 ENCOUNTER — NON-APPOINTMENT (OUTPATIENT)
Age: 75
End: 2025-03-17

## 2025-03-31 ENCOUNTER — NON-APPOINTMENT (OUTPATIENT)
Age: 75
End: 2025-03-31

## 2025-07-13 NOTE — ED PROVIDER NOTE - CLINICAL SUMMARY MEDICAL DECISION MAKING FREE TEXT BOX
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  Family states patient with flulike symptoms today, states took NyQuil approximate 1 hour ago.  Family states last known normal approximately 2200 this evening.  Family states patient confused, unable to stand, generally weak.  Denies any fevers, chest pain, shortness of breath, abdominal pain, vomiting, diarrhea, dysuria, numbness, focal weakness, rash.  Denies any recent injury or trauma.  Patient notification for code stroke. Physical exam per above. No focal neuro deficits, suspect infectious trigger vs. secondary to NyQuil/diphenhydramine use. NIHSS 0. Will obtain labs, imaging, provide supportive treatment, anticipate admission.
13-Jul-2025 12:30

## (undated) DEVICE — PACK MINOR NO DRAPE

## (undated) DEVICE — DRSG STOCKINETTE TUBULAR COTTON 2PLY 4X36"

## (undated) DEVICE — FOR-ESU VALLEYLAB T7E14848DX: Type: DURABLE MEDICAL EQUIPMENT

## (undated) DEVICE — FOR-TOURNIQUET 1130808363: Type: DURABLE MEDICAL EQUIPMENT

## (undated) DEVICE — TOURNIQUET ESMARK 4"

## (undated) DEVICE — SOL IRR POUR NS 0.9% 1500ML

## (undated) DEVICE — SLING ARM CHIEFTAIN MESH XL

## (undated) DEVICE — DRSG ACE BANDAGE 4"

## (undated) DEVICE — GLV 8 PROTEXIS (WHITE)

## (undated) DEVICE — SLV COMPRESSION KNEE MED

## (undated) DEVICE — NDL HYPO SAFE 25G X 1.5" (ORANGE)

## (undated) DEVICE — WARMING BLANKET LOWER ADULT

## (undated) DEVICE — DRSG XEROFORM 1 X 8"

## (undated) DEVICE — SLING ARM CHIEFTAIN MESH MEDIUM

## (undated) DEVICE — DRSG TELFA 3 X 4

## (undated) DEVICE — DRAPE 1/2 SHEET 40X57"

## (undated) DEVICE — DRSG KLING 4"

## (undated) DEVICE — VENODYNE/SCD SLEEVE CALF MEDIUM

## (undated) DEVICE — POSITIONER FOAM EGG CRATE ULNAR 2PCS (PINK)

## (undated) DEVICE — POSITIONER OA ARM ELEVATOR DISP

## (undated) DEVICE — DRAPE LIGHT HANDLE COVER (BLUE)

## (undated) DEVICE — DRAPE HAND 77" X 146"

## (undated) DEVICE — DRSG ACE BANDAGE 3"

## (undated) DEVICE — SUT VICRYL PLUS 4-0 18" PS-2 UNDYED

## (undated) DEVICE — Device

## (undated) DEVICE — ELCTR BIPOLAR CORD 12FT

## (undated) DEVICE — ELCTR BIPOLAR CORD GYRUS 12FT DISP

## (undated) DEVICE — DRSG GAUZE VASELINE PETROLEUM 3 X 9"

## (undated) DEVICE — ELCTR GROUNDING PAD ADULT COVIDIEN

## (undated) DEVICE — SYR LUER LOK 10CC

## (undated) DEVICE — DRSG WEBRIL 3"

## (undated) DEVICE — BLADE SURGICAL #15 CARBON

## (undated) DEVICE — GOWN XL

## (undated) DEVICE — TOURNIQUET CUFF 18" DUAL PORT SINGLE BLADDER (RED)

## (undated) DEVICE — GLV 7.5 PROTEXIS (WHITE)

## (undated) DEVICE — PACK DRSG ABDOMINAL

## (undated) DEVICE — PACK HAND

## (undated) DEVICE — DRSG MASTISOL

## (undated) DEVICE — TOURNIQUET CUFF 18" DUAL PORT SINGLE BLADDER W PLC  (BLACK)

## (undated) DEVICE — DRAPE PAPER BED SHEET QUICKSUITE

## (undated) DEVICE — SLING ARM CHIEFTAIN MESH LARGE

## (undated) DEVICE — DRSG CURITY GAUZE SPONGE 4 X 4" 12-PLY

## (undated) DEVICE — SUT PLAIN GUT 5-0 18" G-3

## (undated) DEVICE — DRSG SPLINT ROLL ORTHO GLASS SYSTEM 3"X15'